# Patient Record
Sex: FEMALE | Race: WHITE | NOT HISPANIC OR LATINO | Employment: FULL TIME | URBAN - METROPOLITAN AREA
[De-identification: names, ages, dates, MRNs, and addresses within clinical notes are randomized per-mention and may not be internally consistent; named-entity substitution may affect disease eponyms.]

---

## 2017-01-19 ENCOUNTER — ALLSCRIPTS OFFICE VISIT (OUTPATIENT)
Dept: OTHER | Facility: OTHER | Age: 62
End: 2017-01-19

## 2017-01-19 DIAGNOSIS — N18.9 CHRONIC KIDNEY DISEASE: ICD-10-CM

## 2017-01-19 DIAGNOSIS — R21 RASH AND OTHER NONSPECIFIC SKIN ERUPTION: ICD-10-CM

## 2017-01-19 DIAGNOSIS — I10 ESSENTIAL (PRIMARY) HYPERTENSION: ICD-10-CM

## 2017-04-19 ENCOUNTER — APPOINTMENT (OUTPATIENT)
Dept: LAB | Facility: CLINIC | Age: 62
End: 2017-04-19
Payer: COMMERCIAL

## 2017-04-19 ENCOUNTER — TRANSCRIBE ORDERS (OUTPATIENT)
Dept: LAB | Facility: CLINIC | Age: 62
End: 2017-04-19

## 2017-04-19 DIAGNOSIS — I10 ESSENTIAL HYPERTENSION, MALIGNANT: ICD-10-CM

## 2017-04-19 DIAGNOSIS — N18.9 CHRONIC KIDNEY DISEASE: ICD-10-CM

## 2017-04-19 DIAGNOSIS — I10 ESSENTIAL (PRIMARY) HYPERTENSION: ICD-10-CM

## 2017-04-19 DIAGNOSIS — R21 RASH AND OTHER NONSPECIFIC SKIN ERUPTION: ICD-10-CM

## 2017-04-19 DIAGNOSIS — N18.9 ANEMIA IN CHRONIC KIDNEY DISEASE(285.21): ICD-10-CM

## 2017-04-19 DIAGNOSIS — N18.9 ANEMIA IN CHRONIC KIDNEY DISEASE(285.21): Primary | ICD-10-CM

## 2017-04-19 DIAGNOSIS — D63.1 ANEMIA IN CHRONIC KIDNEY DISEASE(285.21): ICD-10-CM

## 2017-04-19 DIAGNOSIS — D63.1 ANEMIA IN CHRONIC KIDNEY DISEASE(285.21): Primary | ICD-10-CM

## 2017-04-19 LAB
ALBUMIN SERPL BCP-MCNC: 3.4 G/DL (ref 3.5–5)
ALP SERPL-CCNC: 103 U/L (ref 46–116)
ALT SERPL W P-5'-P-CCNC: 24 U/L (ref 12–78)
ANION GAP SERPL CALCULATED.3IONS-SCNC: 5 MMOL/L (ref 4–13)
AST SERPL W P-5'-P-CCNC: 5 U/L (ref 5–45)
BASOPHILS # BLD AUTO: 0.04 THOUSANDS/ΜL (ref 0–0.1)
BASOPHILS NFR BLD AUTO: 1 % (ref 0–1)
BILIRUB SERPL-MCNC: 0.31 MG/DL (ref 0.2–1)
BUN SERPL-MCNC: 22 MG/DL (ref 5–25)
CALCIUM SERPL-MCNC: 8.7 MG/DL (ref 8.3–10.1)
CHLORIDE SERPL-SCNC: 104 MMOL/L (ref 100–108)
CHOLEST SERPL-MCNC: 182 MG/DL (ref 50–200)
CO2 SERPL-SCNC: 29 MMOL/L (ref 21–32)
CREAT SERPL-MCNC: 0.99 MG/DL (ref 0.6–1.3)
CREAT UR-MCNC: 37.1 MG/DL
EOSINOPHIL # BLD AUTO: 0.17 THOUSAND/ΜL (ref 0–0.61)
EOSINOPHIL NFR BLD AUTO: 3 % (ref 0–6)
ERYTHROCYTE [DISTWIDTH] IN BLOOD BY AUTOMATED COUNT: 15.2 % (ref 11.6–15.1)
GFR SERPL CREATININE-BSD FRML MDRD: 57 ML/MIN/1.73SQ M
GLUCOSE P FAST SERPL-MCNC: 97 MG/DL (ref 65–99)
HCT VFR BLD AUTO: 38.8 % (ref 34.8–46.1)
HDLC SERPL-MCNC: 68 MG/DL (ref 40–60)
HGB BLD-MCNC: 12.3 G/DL (ref 11.5–15.4)
LDLC SERPL CALC-MCNC: 100 MG/DL (ref 0–100)
LYMPHOCYTES # BLD AUTO: 2.21 THOUSANDS/ΜL (ref 0.6–4.47)
LYMPHOCYTES NFR BLD AUTO: 33 % (ref 14–44)
MCH RBC QN AUTO: 28.5 PG (ref 26.8–34.3)
MCHC RBC AUTO-ENTMCNC: 31.7 G/DL (ref 31.4–37.4)
MCV RBC AUTO: 90 FL (ref 82–98)
MICROALBUMIN UR-MCNC: 54.3 MG/L (ref 0–20)
MICROALBUMIN/CREAT 24H UR: 146 MG/G CREATININE (ref 0–30)
MONOCYTES # BLD AUTO: 0.59 THOUSAND/ΜL (ref 0.17–1.22)
MONOCYTES NFR BLD AUTO: 9 % (ref 4–12)
NEUTROPHILS # BLD AUTO: 3.62 THOUSANDS/ΜL (ref 1.85–7.62)
NEUTS SEG NFR BLD AUTO: 54 % (ref 43–75)
NRBC BLD AUTO-RTO: 0 /100 WBCS
PLATELET # BLD AUTO: 253 THOUSANDS/UL (ref 149–390)
PMV BLD AUTO: 11 FL (ref 8.9–12.7)
POTASSIUM SERPL-SCNC: 4.1 MMOL/L (ref 3.5–5.3)
PROT SERPL-MCNC: 7.2 G/DL (ref 6.4–8.2)
RBC # BLD AUTO: 4.32 MILLION/UL (ref 3.81–5.12)
SODIUM SERPL-SCNC: 138 MMOL/L (ref 136–145)
TRIGL SERPL-MCNC: 68 MG/DL
TSH SERPL DL<=0.05 MIU/L-ACNC: 3.46 UIU/ML (ref 0.36–3.74)
WBC # BLD AUTO: 6.67 THOUSAND/UL (ref 4.31–10.16)

## 2017-04-19 PROCEDURE — 80053 COMPREHEN METABOLIC PANEL: CPT

## 2017-04-19 PROCEDURE — 80061 LIPID PANEL: CPT

## 2017-04-19 PROCEDURE — 36415 COLL VENOUS BLD VENIPUNCTURE: CPT

## 2017-04-19 PROCEDURE — 82043 UR ALBUMIN QUANTITATIVE: CPT

## 2017-04-19 PROCEDURE — 85025 COMPLETE CBC W/AUTO DIFF WBC: CPT

## 2017-04-19 PROCEDURE — 84443 ASSAY THYROID STIM HORMONE: CPT

## 2017-04-19 PROCEDURE — 82570 ASSAY OF URINE CREATININE: CPT

## 2017-05-03 ENCOUNTER — ALLSCRIPTS OFFICE VISIT (OUTPATIENT)
Dept: OTHER | Facility: OTHER | Age: 62
End: 2017-05-03

## 2017-06-08 ENCOUNTER — GENERIC CONVERSION - ENCOUNTER (OUTPATIENT)
Dept: OTHER | Facility: OTHER | Age: 62
End: 2017-06-08

## 2017-06-29 ENCOUNTER — ALLSCRIPTS OFFICE VISIT (OUTPATIENT)
Dept: OTHER | Facility: OTHER | Age: 62
End: 2017-06-29

## 2017-11-11 ENCOUNTER — APPOINTMENT (OUTPATIENT)
Dept: LAB | Facility: MEDICAL CENTER | Age: 62
End: 2017-11-11
Payer: COMMERCIAL

## 2017-11-11 ENCOUNTER — TRANSCRIBE ORDERS (OUTPATIENT)
Dept: ADMINISTRATIVE | Facility: HOSPITAL | Age: 62
End: 2017-11-11

## 2017-11-11 DIAGNOSIS — N28.1 KIDNEY CYST, ACQUIRED: Primary | ICD-10-CM

## 2017-11-11 DIAGNOSIS — N18.2 CHRONIC KIDNEY DISEASE, STAGE II (MILD): ICD-10-CM

## 2017-11-11 DIAGNOSIS — N18.2 CHRONIC KIDNEY DISEASE, STAGE II (MILD): Primary | ICD-10-CM

## 2017-11-11 LAB
ALBUMIN SERPL BCP-MCNC: 3.8 G/DL (ref 3.5–5)
ALP SERPL-CCNC: 106 U/L (ref 46–116)
ALT SERPL W P-5'-P-CCNC: 27 U/L (ref 12–78)
ANION GAP SERPL CALCULATED.3IONS-SCNC: 7 MMOL/L (ref 4–13)
AST SERPL W P-5'-P-CCNC: 7 U/L (ref 5–45)
BASOPHILS # BLD AUTO: 0.04 THOUSANDS/ΜL (ref 0–0.1)
BASOPHILS NFR BLD AUTO: 1 % (ref 0–1)
BILIRUB SERPL-MCNC: 0.45 MG/DL (ref 0.2–1)
BUN SERPL-MCNC: 19 MG/DL (ref 5–25)
CALCIUM SERPL-MCNC: 9.3 MG/DL (ref 8.3–10.1)
CHLORIDE SERPL-SCNC: 106 MMOL/L (ref 100–108)
CO2 SERPL-SCNC: 28 MMOL/L (ref 21–32)
CREAT SERPL-MCNC: 1.09 MG/DL (ref 0.6–1.3)
CREAT UR-MCNC: 134 MG/DL
EOSINOPHIL # BLD AUTO: 0.09 THOUSAND/ΜL (ref 0–0.61)
EOSINOPHIL NFR BLD AUTO: 1 % (ref 0–6)
ERYTHROCYTE [DISTWIDTH] IN BLOOD BY AUTOMATED COUNT: 14.5 % (ref 11.6–15.1)
GFR SERPL CREATININE-BSD FRML MDRD: 55 ML/MIN/1.73SQ M
GLUCOSE P FAST SERPL-MCNC: 113 MG/DL (ref 65–99)
HCT VFR BLD AUTO: 40.5 % (ref 34.8–46.1)
HGB BLD-MCNC: 12.9 G/DL (ref 11.5–15.4)
LYMPHOCYTES # BLD AUTO: 2.68 THOUSANDS/ΜL (ref 0.6–4.47)
LYMPHOCYTES NFR BLD AUTO: 37 % (ref 14–44)
MAGNESIUM SERPL-MCNC: 2.4 MG/DL (ref 1.6–2.6)
MCH RBC QN AUTO: 28.4 PG (ref 26.8–34.3)
MCHC RBC AUTO-ENTMCNC: 31.9 G/DL (ref 31.4–37.4)
MCV RBC AUTO: 89 FL (ref 82–98)
MICROALBUMIN UR-MCNC: 176 MG/L (ref 0–20)
MICROALBUMIN/CREAT 24H UR: 131 MG/G CREATININE (ref 0–30)
MONOCYTES # BLD AUTO: 0.49 THOUSAND/ΜL (ref 0.17–1.22)
MONOCYTES NFR BLD AUTO: 7 % (ref 4–12)
NEUTROPHILS # BLD AUTO: 4 THOUSANDS/ΜL (ref 1.85–7.62)
NEUTS SEG NFR BLD AUTO: 54 % (ref 43–75)
NRBC BLD AUTO-RTO: 0 /100 WBCS
PLATELET # BLD AUTO: 260 THOUSANDS/UL (ref 149–390)
PMV BLD AUTO: 10.8 FL (ref 8.9–12.7)
POTASSIUM SERPL-SCNC: 3.8 MMOL/L (ref 3.5–5.3)
PROT SERPL-MCNC: 7.9 G/DL (ref 6.4–8.2)
RBC # BLD AUTO: 4.54 MILLION/UL (ref 3.81–5.12)
SODIUM SERPL-SCNC: 141 MMOL/L (ref 136–145)
WBC # BLD AUTO: 7.33 THOUSAND/UL (ref 4.31–10.16)

## 2017-11-11 PROCEDURE — 82570 ASSAY OF URINE CREATININE: CPT | Performed by: INTERNAL MEDICINE

## 2017-11-11 PROCEDURE — 83735 ASSAY OF MAGNESIUM: CPT

## 2017-11-11 PROCEDURE — 36415 COLL VENOUS BLD VENIPUNCTURE: CPT

## 2017-11-11 PROCEDURE — 80053 COMPREHEN METABOLIC PANEL: CPT

## 2017-11-11 PROCEDURE — 82043 UR ALBUMIN QUANTITATIVE: CPT | Performed by: INTERNAL MEDICINE

## 2017-11-11 PROCEDURE — 85025 COMPLETE CBC W/AUTO DIFF WBC: CPT

## 2017-11-24 ENCOUNTER — HOSPITAL ENCOUNTER (OUTPATIENT)
Dept: ULTRASOUND IMAGING | Facility: MEDICAL CENTER | Age: 62
Discharge: HOME | End: 2017-11-24
Payer: COMMERCIAL

## 2017-11-24 DIAGNOSIS — N28.1 KIDNEY CYST, ACQUIRED: ICD-10-CM

## 2017-11-24 PROCEDURE — 76770 US EXAM ABDO BACK WALL COMP: CPT

## 2017-12-11 ENCOUNTER — GENERIC CONVERSION - ENCOUNTER (OUTPATIENT)
Dept: FAMILY MEDICINE CLINIC | Facility: CLINIC | Age: 62
End: 2017-12-11

## 2017-12-14 ENCOUNTER — ALLSCRIPTS OFFICE VISIT (OUTPATIENT)
Dept: OTHER | Facility: OTHER | Age: 62
End: 2017-12-14

## 2017-12-29 NOTE — PROGRESS NOTES
Assessment   1  Atrial fibrillation, unspecified type (427 31) (I48 91)   2  Essential hypertension (401 9) (I10)   3  Chronic kidney disease (585 9) (N18 9)   4  Screen for colon cancer (V76 51) (Z12 11)    Plan   Atrial fibrillation, unspecified type    · 3 - Markson MD, Leopold Mallet  (Cardiology) Co-Management  *  Status: Active  Requested for:    [id number]  Care Summary provided  : Yes  Need for influenza vaccination    · Fluzone Quadrivalent 0 5 ML Intramuscular Suspension  Screen for colon cancer    · (1) OCCULT BLOOD, FECAL IMMUNOCHEMICAL TEST; Status:Active; Requested    for:[id number];     Discussion/Summary      F/u approx 3 months  The patient was counseled regarding diagnostic results,-- instructions for management,-- risk factor reductions,-- patient and family education,-- impressions  The treatment plan was reviewed with the patient/guardian  The patient/guardian understands and agrees with the treatment plan      Chief Complaint   Regular follow up      History of Present Illness   just saw nephrologist 3 days ago, lisinopril increased to 10mg daily and started on 12 5mg hctz - will see again in follow up in april not seen cardiologist for 2 years due to no insurance      Review of Systems        Constitutional: recent 10 pounds intentionally lb weight loss, but-- no fever,-- not feeling poorly,-- no chills-- and-- not feeling tired  Cardiovascular: No complaints of slow heart rate, no fast heart rate, no chest pain, no palpitations, no leg claudication, no lower extremity edema  Respiratory: No complaints of shortness of breath, no wheezing, no cough, no SOB on exertion, no orthopnea, no PND  Gastrointestinal: No complaints of abdominal pain, no constipation, no nausea or vomiting, no diarrhea, no bloody stools  Genitourinary: No complaints of dysuria, no incontinence, no pelvic pain, no dysmenorrhea, no vaginal discharge or bleeding        Musculoskeletal: knees bother standing on concrete all day, but-- no joint swelling-- and-- no limb swelling  Integumentary: No complaints of skin rash or lesions, no itching, no skin wounds, no breast pain or lump  Neurological: No complaints of headache, no confusion, no convulsions, no numbness, no dizziness or fainting, no tingling, no limb weakness, no difficulty walking  Psychiatric: Not suicidal, no sleep disturbance, no anxiety or depression, no change in personality, no emotional problems  Endocrine: No complaints of proptosis, no hot flashes, no muscle weakness, no deepening of the voice, no feelings of weakness  Hematologic/Lymphatic: No complaints of swollen glands, no swollen glands in the neck, does not bleed easily, does not bruise easily  Active Problems   1  Atrial fibrillation, unspecified type (427 31) (I48 91)   2  Chronic kidney disease (585 9) (N18 9)   3  Essential hypertension (401 9) (I10)   4  Morbid obesity (278 01) (E66 01)   5  Multiple renal cysts (753 19) (Q61 02)   6  Need for influenza vaccination (V04 81) (Z23)   7  Pain of right scapula (733 90) (J71 7J8)    Past Medical History   1  History of Acute gastroenteritis (558 9) (K52 9)   2  History of Acute vomiting (787 03) (R11 10)   3  History of fever (V13 89) (Z87 898)   4  History of headache (V13 89) (Z87 898)   5  History of hypokalemia (V12 29) (Z86 39)   6  History of pneumonia (V12 61) (Z87 01)   7  History of Hyponatremia (276 1) (E87 1)   8  History of Irregular heartbeat (427 9) (I49 9)   9  History of Need for Tdap vaccination (V06 1) (Z23)   10  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)   11  History of Rash (782 1) (R21)   12  History of Rash, skin (782 1) (R21)   13  History of Sinus problem (473 9) (J34 9)   14  History of Spell of dizziness (780 4) (R42)     The active problems and past medical history were reviewed and updated today        Surgical History      The surgical history was reviewed and updated today  Family History   Mother    1  Family history of    2  Family history of malignant neoplasm (V16 9) (Z80 9)  Father    3  Family history of    4  Family history of hypertension (V17 49) (Z82 49)   5  Family history of malignant neoplasm (V16 9) (Z80 9)  Sister    6  Family history of epilepsy (V17 2) (Z82 0)   7  Family history of hypertension (V17 49) (Z82 49)  Brother    6  Family history of colon cancer (V16 0) (Z80 0)   9  Family history of hypertension (V17 49) (Z82 49)   10  Family history of malignant neoplasm (V16 9) (Z80 9)     The family history was reviewed and updated today  Social History    · Never a smoker   · No alcohol use   · No drug use  The social history was reviewed and updated today  The social history was reviewed and is unchanged  Current Meds    1  AmLODIPine Besylate 5 MG Oral Tablet; Take 1 tablet daily; Therapy: 20KSX5792 to (Evaluate:44Ehu9824)  Requested for: 87HXZ2816; Last     Rx:2017 Ordered   2  Aspirin 81 MG TABS; TAKE 1 TABLET DAILY; Therapy: 73ZJY5791 to (Evaluate:2016) Recorded   3  Cranberry 500 MG Oral Capsule; take 1 capsule daily; Therapy: 47YDJ1029 to Recorded   4  Daily Multiple Vitamins Oral Tablet; TAKE 1 TABLET DAILY; Therapy: 68ZRV8865 to Recorded   5  Fish Oil 1000 MG Oral Capsule; TAKE 1 CAPSULE DAILY; Therapy: 16ZKQ5110 to Recorded   6  HydroCHLOROthiazide 12 5 MG Oral Tablet; Therapy: 70IXB9755 to Recorded   7  Lisinopril 10 MG Oral Tablet; Therapy: 93MSG6236 to Recorded   8  Metoprolol Tartrate 50 MG Oral Tablet; TAKE 1 TABLET TWICE DAILY; Therapy: 91EDO0060 to (Evaluate:23Ikj5094)  Requested for: 57NIV9859; Last     Rx:2017 Ordered   9  Vitamin D3 2000 UNIT Oral Capsule; take 1 capsule daily; Therapy: 21UCH3223 to Recorded     The medication list was reviewed and updated today  Allergies   1  No Known Drug Allergies  2  No Known Food Allergies   3  Seasonal    Vitals   Vital Signs    Recorded: 87VRL0984 05:30PM   Temperature 97 1 F   Heart Rate 97   Respiration 18   Systolic 276   Diastolic 86   Height 5 ft 9 in   Weight 277 lb 8 oz   BMI Calculated 40 98   BSA Calculated 2 37   O2 Saturation 99     Physical Exam        Constitutional      General appearance: Abnormal  -- unchanged appearance  Eyes      Conjunctiva and lids: No swelling, erythema or discharge  Pupils and irises: Equal, round and reactive to light  Ears, Nose, Mouth, and Throat      Oropharynx: Normal with no erythema, edema, exudate or lesions  Pulmonary      Respiratory effort: No increased work of breathing or signs of respiratory distress  Auscultation of lungs: Clear to auscultation  Cardiovascular      Auscultation of heart: Normal rate and rhythm, normal S1 and S2, without murmurs  Examination of extremities for edema and/or varicosities: Abnormal   bilateral ankle trcae+ pitting edema-- and-- bilateral pretibial trace+ pitting edema  Lymphatic      Palpation of lymph nodes in neck: No lymphadenopathy  Musculoskeletal      Gait and station: Normal        Digits and nails: Normal without clubbing or cyanosis  Neurologic      Reflexes: 2+ and symmetric  Psychiatric      Mood and affect: Normal           Results/Data   (1) CBC/PLT/DIFF [id number] 07:30AM EP[facility], Provider   Test ordered by: Evaristo Leung      Test Name Result Flag Reference   WBC COUNT 7 33 Thousand/uL  4 31-10 16   RBC COUNT 4 54 Million/uL  3 81-5 12   HEMOGLOBIN 12 9 g/dL  11 5-15 4   HEMATOCRIT 40 5 %  34 8-46  1   MCV 89 fL  82-98   MCH 28 4 pg  26 8-34 3   MCHC 31 9 g/dL  31 4-37 4   RDW 14 5 %  11 6-15 1   MPV 10 8 fL  8 9-12 7   PLATELET COUNT 288 Thousands/uL  149-390   nRBC AUTOMATED 0 /100 WBCs     NEUTROPHILS RELATIVE PERCENT 54 %  43-75   LYMPHOCYTES RELATIVE PERCENT 37 %  14-44   MONOCYTES RELATIVE PERCENT 7 %  4-12   EOSINOPHILS RELATIVE PERCENT 1 % 0-6   BASOPHILS RELATIVE PERCENT 1 %  0-1   NEUTROPHILS ABSOLUTE COUNT 4 00 Thousands/? ??L  1 85-7 62   LYMPHOCYTES ABSOLUTE COUNT 2 68 Thousands/? ??L  0 60-4 47   MONOCYTES ABSOLUTE COUNT 0 49 Thousand/? ??L  0 17-1 22   EOSINOPHILS ABSOLUTE COUNT 0 09 Thousand/? ??L  0 00-0 61   BASOPHILS ABSOLUTE COUNT 0 04 Thousands/? ??L  0 00-0 10   This is a patient instruction: This test is non-fasting  Please drink two glasses of water morning of bloodwork  (1) MAGNESIUM [id number] 07:30AM EPIC, Provider   Test ordered by: Isis Cui      Test Name Result Flag Reference   MAGNESIUM 2 4 mg/dL  1 6-2 6      (1) COMPREHENSIVE METABOLIC PANEL [id number] 83:95NO EPIC, Provider   Test ordered by: [employer]      Test Name Result Flag Reference   SODIUM 141 mmol/L  136-145   POTASSIUM 3 8 mmol/L  3 5-5 3   CHLORIDE 106 mmol/L  100-108   CARBON DIOXIDE 28 mmol/L  21-32   ANION GAP (CALC) 7 mmol/L  4-13   BLOOD UREA NITROGEN 19 mg/dL  5-25   CREATININE 1 09 mg/dL  0 60-1 30   Standardized to IDMS reference method   CALCIUM 9 3 mg/dL  8 3-10 1   BILI, TOTAL 0 45 mg/dL  0 20-1 00   ALK PHOSPHATAS 106 U/L     ALT (SGPT) 27 U/L  12-78   Specimen collection should occur prior to Sulfasalazine and/or Sulfapyridine administration due to the potential for falsely depressed results  AST(SGOT) 7 U/L  5-45   Specimen collection should occur prior to Sulfasalazine administration due to the potential for falsely depressed results  ALBUMIN 3 8 g/dL  3 5-5 0   TOTAL PROTEIN 7 9 g/dL  6 4-8 2   eGFR 55 ml/min/1 73sq m     [facility] [employer] Disease Education Program recommendations are as follows:     GFR calculation is accurate only with a steady state creatinine     Chronic Kidney disease less than 60 ml/min/1 73 sq  meters     Kidney failure less than 15 ml/min/1 73 sq  meters     GLUCOSE FASTING 113 mg/dL H 65-99   Specimen collection should occur prior to Sulfasalazine administration due to the potential for falsely depressed results  Specimen collection should occur prior to Sulfapyridine administration due to the potential for falsely elevated results        (1) MICROALBUMIN CREATININE RATIO, RANDOM URINE [id number] 07:30AM [facility], Provider   Test ordered by: Ana Kumar      Test Name Result Flag Reference   MICROALBUMIN/ CREAT R 131 mg/g creatinine H 0-30   MICROALBUMIN,URINE 176 0 mg/L H 0 0-20 0   CREATININE URINE 134 0 mg/dL        Signatures    Electronically signed by : Rene Escobar DO; Dec 28 2017 12:59PM EST                       (Author)

## 2018-01-12 NOTE — MISCELLANEOUS
Message  Return to work or school:   Hung Parker is under my professional care  She was seen in my office on 07/26/2016   She is able to return to work on  08/01/2016   She is not able to return to work until 08/01/2016 -Patient was out of work 07/25/2016 thru 07/29/2016   She is able to perform activities of daily living without limitations  , She can perform work without limitations  Dr Patti Delgado          Signatures   Electronically signed by : Isreal Brown RN; Jul 29 2016 10:19AM EST                       (Author)    Electronically signed by : Patti Delgado DO; Aug  2 2016  4:15PM EST                       (Author)

## 2018-01-13 VITALS
BODY MASS INDEX: 41.83 KG/M2 | OXYGEN SATURATION: 98 % | HEART RATE: 90 BPM | DIASTOLIC BLOOD PRESSURE: 112 MMHG | TEMPERATURE: 98.9 F | SYSTOLIC BLOOD PRESSURE: 176 MMHG | WEIGHT: 282.38 LBS | HEIGHT: 69 IN

## 2018-01-14 VITALS
OXYGEN SATURATION: 97 % | TEMPERATURE: 101.8 F | WEIGHT: 287.38 LBS | SYSTOLIC BLOOD PRESSURE: 140 MMHG | DIASTOLIC BLOOD PRESSURE: 80 MMHG | HEIGHT: 69 IN | RESPIRATION RATE: 18 BRPM | HEART RATE: 102 BPM | BODY MASS INDEX: 42.56 KG/M2

## 2018-01-14 VITALS
OXYGEN SATURATION: 97 % | HEIGHT: 69 IN | TEMPERATURE: 98.8 F | DIASTOLIC BLOOD PRESSURE: 100 MMHG | HEART RATE: 98 BPM | RESPIRATION RATE: 19 BRPM | BODY MASS INDEX: 40.45 KG/M2 | SYSTOLIC BLOOD PRESSURE: 160 MMHG | WEIGHT: 273.13 LBS

## 2018-01-15 NOTE — MISCELLANEOUS
Message  Return to work or school:    She is able to return to work on  08/24/2016      Dr Kushal Ayala - Patient has been treated for Lyme disease          Signatures   Electronically signed by : Red Aguilar RN; Aug 22 2016  2:10PM EST                       (Author)

## 2018-01-23 VITALS
WEIGHT: 277.5 LBS | HEIGHT: 69 IN | RESPIRATION RATE: 18 BRPM | TEMPERATURE: 97.1 F | OXYGEN SATURATION: 99 % | BODY MASS INDEX: 41.1 KG/M2 | HEART RATE: 97 BPM | DIASTOLIC BLOOD PRESSURE: 86 MMHG | SYSTOLIC BLOOD PRESSURE: 144 MMHG

## 2018-02-15 DIAGNOSIS — I10 ESSENTIAL HYPERTENSION: Primary | ICD-10-CM

## 2018-02-15 RX ORDER — METOPROLOL TARTRATE 50 MG/1
50 TABLET, FILM COATED ORAL 2 TIMES DAILY
Qty: 180 TABLET | Refills: 0 | Status: SHIPPED | OUTPATIENT
Start: 2018-02-15 | End: 2018-02-18 | Stop reason: SDUPTHER

## 2018-02-15 RX ORDER — AMLODIPINE BESYLATE 5 MG/1
5 TABLET ORAL DAILY
Qty: 90 TABLET | Refills: 0 | Status: SHIPPED | OUTPATIENT
Start: 2018-02-15 | End: 2018-06-09 | Stop reason: SDUPTHER

## 2018-02-15 RX ORDER — METOPROLOL TARTRATE 50 MG/1
1 TABLET, FILM COATED ORAL 2 TIMES DAILY
COMMUNITY
Start: 2015-10-29 | End: 2018-02-15 | Stop reason: SDUPTHER

## 2018-02-15 RX ORDER — AMLODIPINE BESYLATE 5 MG/1
1 TABLET ORAL DAILY
COMMUNITY
Start: 2017-01-19 | End: 2018-02-15 | Stop reason: SDUPTHER

## 2018-02-18 DIAGNOSIS — I10 ESSENTIAL HYPERTENSION: ICD-10-CM

## 2018-02-19 RX ORDER — METOPROLOL TARTRATE 50 MG/1
TABLET, FILM COATED ORAL
Qty: 180 TABLET | Refills: 0 | Status: SHIPPED | OUTPATIENT
Start: 2018-02-19 | End: 2018-07-25 | Stop reason: SDUPTHER

## 2018-04-14 LAB
ALBUMIN SERPL BCP-MCNC: 4.3 G/DL (ref 3.5–5.7)
ALP SERPL-CCNC: 87 IU/L (ref 55–165)
ALT SERPL W P-5'-P-CCNC: 15 IU/L (ref 10–30)
ANION GAP SERPL CALCULATED.3IONS-SCNC: 13.4 MM/L
AST SERPL W P-5'-P-CCNC: 9 U/L (ref 7–26)
BILIRUB SERPL-MCNC: 0.4 MG/DL (ref 0.3–1)
BUN SERPL-MCNC: 29 MG/DL (ref 7–25)
CALCIUM SERPL-MCNC: 9.4 MG/DL (ref 8.6–10.5)
CHLORIDE SERPL-SCNC: 105 MM/L (ref 98–107)
CO2 SERPL-SCNC: 27 MM/L (ref 21–31)
CREAT SERPL-MCNC: 1.16 MG/DL (ref 0.6–1.2)
EGFR (HISTORICAL): 47 GFR
EGFR AFRICAN AMERICAN (HISTORICAL): 57 GFR
GLUCOSE (HISTORICAL): 110 MG/DL (ref 65–99)
OSMOLALITY, SERUM (HISTORICAL): 288 MOSM (ref 262–291)
POTASSIUM SERPL-SCNC: 4.4 MM/L (ref 3.5–5.5)
SODIUM SERPL-SCNC: 141 MM/L (ref 134–143)
TOTAL PROTEIN (HISTORICAL): 7.2 G/DL (ref 6.4–8.9)

## 2018-06-09 DIAGNOSIS — I10 ESSENTIAL HYPERTENSION: ICD-10-CM

## 2018-06-10 RX ORDER — AMLODIPINE BESYLATE 5 MG/1
TABLET ORAL
Qty: 30 TABLET | Refills: 0 | Status: SHIPPED | OUTPATIENT
Start: 2018-06-10 | End: 2018-07-10 | Stop reason: SDUPTHER

## 2018-07-09 RX ORDER — CHLORAL HYDRATE 500 MG
1 CAPSULE ORAL DAILY
COMMUNITY
Start: 2015-10-29

## 2018-07-09 RX ORDER — PYRIDOXINE HCL (VITAMIN B6) 100 MG
1 TABLET ORAL DAILY
COMMUNITY
Start: 2015-10-29

## 2018-07-09 RX ORDER — HYDROCHLOROTHIAZIDE 12.5 MG/1
TABLET ORAL
COMMUNITY
Start: 2017-12-14 | End: 2018-10-15 | Stop reason: DRUGHIGH

## 2018-07-09 RX ORDER — ACETAMINOPHEN 160 MG
1 TABLET,DISINTEGRATING ORAL DAILY
COMMUNITY
Start: 2015-10-29

## 2018-07-09 RX ORDER — LISINOPRIL 10 MG/1
10 TABLET ORAL DAILY
Refills: 3 | COMMUNITY
Start: 2018-05-05 | End: 2018-10-15 | Stop reason: DRUGHIGH

## 2018-07-10 DIAGNOSIS — I10 ESSENTIAL HYPERTENSION: ICD-10-CM

## 2018-07-12 ENCOUNTER — OFFICE VISIT (OUTPATIENT)
Dept: FAMILY MEDICINE CLINIC | Facility: CLINIC | Age: 63
End: 2018-07-12
Payer: COMMERCIAL

## 2018-07-12 VITALS
HEART RATE: 85 BPM | DIASTOLIC BLOOD PRESSURE: 90 MMHG | SYSTOLIC BLOOD PRESSURE: 140 MMHG | BODY MASS INDEX: 41.62 KG/M2 | HEIGHT: 68 IN | OXYGEN SATURATION: 98 % | WEIGHT: 274.6 LBS | TEMPERATURE: 98.9 F

## 2018-07-12 DIAGNOSIS — I10 ESSENTIAL HYPERTENSION: ICD-10-CM

## 2018-07-12 DIAGNOSIS — E66.01 MORBID OBESITY (HCC): ICD-10-CM

## 2018-07-12 DIAGNOSIS — N18.9 CHRONIC KIDNEY DISEASE, UNSPECIFIED CKD STAGE: ICD-10-CM

## 2018-07-12 DIAGNOSIS — I48.91 ATRIAL FIBRILLATION, UNSPECIFIED TYPE (HCC): ICD-10-CM

## 2018-07-12 DIAGNOSIS — Z23 NEED FOR SHINGLES VACCINE: ICD-10-CM

## 2018-07-12 DIAGNOSIS — Z12.11 SCREEN FOR COLON CANCER: Primary | ICD-10-CM

## 2018-07-12 PROCEDURE — 99214 OFFICE O/P EST MOD 30 MIN: CPT | Performed by: FAMILY MEDICINE

## 2018-07-12 PROCEDURE — 3008F BODY MASS INDEX DOCD: CPT | Performed by: FAMILY MEDICINE

## 2018-07-12 PROCEDURE — 1036F TOBACCO NON-USER: CPT | Performed by: FAMILY MEDICINE

## 2018-07-12 RX ORDER — AMLODIPINE BESYLATE 5 MG/1
TABLET ORAL
Qty: 30 TABLET | Refills: 0 | Status: SHIPPED | OUTPATIENT
Start: 2018-07-12 | End: 2018-07-12 | Stop reason: DRUGHIGH

## 2018-07-12 RX ORDER — AMLODIPINE BESYLATE 10 MG/1
10 TABLET ORAL DAILY
Qty: 90 TABLET | Refills: 0 | Status: SHIPPED | OUTPATIENT
Start: 2018-07-12 | End: 2018-10-08 | Stop reason: SDUPTHER

## 2018-07-12 RX ORDER — AMLODIPINE BESYLATE 5 MG/1
5 TABLET ORAL DAILY
Qty: 90 TABLET | Refills: 0 | Status: CANCELLED | OUTPATIENT
Start: 2018-07-12

## 2018-07-12 NOTE — PROGRESS NOTES
Assessment/Plan:       Diagnoses and all orders for this visit:    Screen for colon cancer    Essential hypertension  -     amLODIPine (NORVASC) 10 mg tablet; Take 1 tablet (10 mg total) by mouth daily  -     Lipid panel; Future    Atrial fibrillation, unspecified type ([facility] [address] )  -     Lipid panel; Future    Morbid obesity ([facility] )  -     Lipid panel; Future    Chronic kidney disease, unspecified CKD stage  -     Lipid panel; Future    Need for shingles vaccine  -     Zoster Vac Recomb Adjuvanted ([address] [place], [place] [address]) 50 MCG SUSR; Inject 1 Dose into a muscle once for 1 dose    Other orders  -     aspirin 81 MG tablet; Take 1 tablet by mouth daily  -     Cranberry 500 MG CAPS; Take 1 capsule by mouth daily  -     Multiple Vitamins-Minerals (DAILY MULTIPLE VITAMINS/MIN PO); Take 1 tablet by mouth daily  -     Omega-3 Fatty Acids (FISH OIL) 1,000 mg; Take 1 capsule by mouth daily  -     hydrochlorothiazide (HYDRODIURIL) 12 5 mg tablet; Take by mouth  -     lisinopril (ZESTRIL) 10 mg tablet; Take 10 mg by mouth daily  -     Cholecalciferol (VITAMIN D3) 2000 units capsule; Take 1 capsule by mouth daily  -     Cancel: Ambulatory referral to Gastroenterology; Future  -     Cancel: amLODIPine (NORVASC) 5 mg tablet; Take 1 tablet (5 mg total) by mouth daily  -     Protein (NUTRA/PRO CHOCOLATE PO); Take by mouth     "other orders" are not orders, just reconciled by clinical staff at rooming     Subjective:   Chief Complaint   Patient presents with    Follow-up     Pt is here for her 6 month f/u exam of chronic conditions  Patient ID: Rafael Jacques is a 61 y o  female      Per c/c reviewed by me  Sees her nephrologist oct 18th  Her last cardiol visit was 2015, supposed to be yearly for the Afib/flutter and HTN, but pt has high deductible and financial hardship, difficult for her  to see specialists and have testing done due to out of pocket expense  Last ekg was 2015 as well  Rate controlled on beta blocker  Last visit here in December 2017, bp was 144/86, was supposed to have followed up 3 mo later and did not        The following portions of the patient's history were reviewed and updated as appropriate: allergies, current medications, past family history, past medical history, past social history, past surgical history and problem list     Review of Systems   Constitutional: Negative  HENT: Negative for mouth sores, nosebleeds, sore throat, trouble swallowing and voice change  Eyes: Negative  Respiratory: Negative  Cardiovascular: Positive for leg swelling (end of a day of standing on concrete, ankle are a little swollen)  Negative for chest pain and palpitations  Gastrointestinal: Negative  Endocrine: Negative  Genitourinary: Negative  Skin: Negative  Neurological: Negative  Hematological: Negative  Objective:      /90 (BP Location: Left arm, Patient Position: Sitting, Cuff Size: Large)   Pulse 85   Temp 98 9 °F (37 2 °C) (Tympanic)   Ht 5' 8" (1 727 m)   Wt 125 kg (274 lb 9 6 oz)   SpO2 98%   BMI 41 75 kg/m²          Physical Exam   Constitutional: She is oriented to person, place, and time  She appears well-developed  She is cooperative  Non-toxic appearance  She does not have a sickly appearance  She does not appear ill  No distress  HENT:   Head: Normocephalic and atraumatic  Mouth/Throat: Uvula is midline, oropharynx is clear and moist and mucous membranes are normal    Eyes: Conjunctivae, EOM and lids are normal  Pupils are equal, round, and reactive to light  Neck: Trachea normal  Neck supple  No JVD present  Carotid bruit is not present  No thyroid mass and no thyromegaly present  Cardiovascular: Normal rate, S1 normal, S2 normal and normal pulses  An irregularly irregular rhythm present  Exam reveals distant heart sounds  Exam reveals no gallop and no friction rub  Pulmonary/Chest: Effort normal and breath sounds normal    Abdominal: Soft   She exhibits no distension, no abdominal bruit, no ascites and no mass  Bowel sounds are decreased  There is no hepatosplenomegaly  There is no tenderness  Lymphadenopathy:     She has no cervical adenopathy  Right: No supraclavicular adenopathy present  Left: No supraclavicular adenopathy present  Neurological: She is alert and oriented to person, place, and time  She has normal reflexes  Gait normal    Skin: Skin is warm and dry  She is not diaphoretic  No cyanosis  No pallor  Psychiatric: She has a normal mood and affect  Her behavior is normal    Nursing note and vitals reviewed

## 2018-07-25 DIAGNOSIS — I10 ESSENTIAL HYPERTENSION: ICD-10-CM

## 2018-07-26 RX ORDER — METOPROLOL TARTRATE 50 MG/1
TABLET, FILM COATED ORAL
Qty: 180 TABLET | Refills: 0 | Status: SHIPPED | OUTPATIENT
Start: 2018-07-26 | End: 2018-10-25 | Stop reason: SDUPTHER

## 2018-10-08 DIAGNOSIS — I10 ESSENTIAL HYPERTENSION: ICD-10-CM

## 2018-10-08 RX ORDER — AMLODIPINE BESYLATE 10 MG/1
TABLET ORAL
Qty: 90 TABLET | Refills: 0 | Status: SHIPPED | OUTPATIENT
Start: 2018-10-08 | End: 2019-03-22 | Stop reason: SDUPTHER

## 2018-10-13 ENCOUNTER — TRANSCRIBE ORDERS (OUTPATIENT)
Dept: ADMINISTRATIVE | Facility: HOSPITAL | Age: 63
End: 2018-10-13

## 2018-10-13 ENCOUNTER — APPOINTMENT (OUTPATIENT)
Dept: LAB | Facility: HOSPITAL | Age: 63
End: 2018-10-13
Payer: COMMERCIAL

## 2018-10-13 DIAGNOSIS — E66.01 MORBID OBESITY (HCC): ICD-10-CM

## 2018-10-13 DIAGNOSIS — I48.91 ATRIAL FIBRILLATION, UNSPECIFIED TYPE (HCC): ICD-10-CM

## 2018-10-13 DIAGNOSIS — N18.30 CHRONIC KIDNEY DISEASE, STAGE III (MODERATE) (HCC): ICD-10-CM

## 2018-10-13 DIAGNOSIS — R80.9 PROTEINURIA, UNSPECIFIED TYPE: Primary | ICD-10-CM

## 2018-10-13 DIAGNOSIS — R80.9 PROTEINURIA, UNSPECIFIED TYPE: ICD-10-CM

## 2018-10-13 DIAGNOSIS — N18.9 CHRONIC KIDNEY DISEASE, UNSPECIFIED CKD STAGE: ICD-10-CM

## 2018-10-13 DIAGNOSIS — I10 ESSENTIAL HYPERTENSION: ICD-10-CM

## 2018-10-13 LAB
ALBUMIN SERPL BCP-MCNC: 4.3 G/DL (ref 3.5–5.7)
ALP SERPL-CCNC: 86 U/L (ref 55–165)
ALT SERPL W P-5'-P-CCNC: 16 U/L (ref 7–52)
ANION GAP SERPL CALCULATED.3IONS-SCNC: 7 MMOL/L (ref 4–13)
AST SERPL W P-5'-P-CCNC: 8 U/L (ref 13–39)
BACTERIA UR QL AUTO: ABNORMAL /HPF
BASOPHILS # BLD AUTO: 0 THOUSANDS/ΜL (ref 0–0.1)
BASOPHILS NFR BLD AUTO: 1 % (ref 0–1)
BILIRUB SERPL-MCNC: 0.4 MG/DL (ref 0.2–1)
BILIRUB UR QL STRIP: NEGATIVE
BUN SERPL-MCNC: 32 MG/DL (ref 7–25)
CALCIUM SERPL-MCNC: 9.8 MG/DL (ref 8.6–10.5)
CHLORIDE SERPL-SCNC: 105 MMOL/L (ref 98–107)
CHOLEST SERPL-MCNC: 161 MG/DL (ref 0–200)
CLARITY UR: CLEAR
CO2 SERPL-SCNC: 29 MMOL/L (ref 21–31)
COLOR UR: YELLOW
CREAT SERPL-MCNC: 1.2 MG/DL (ref 0.6–1.2)
CREAT UR-MCNC: 72.3 MG/DL
EOSINOPHIL # BLD AUTO: 0.1 THOUSAND/ΜL (ref 0–0.61)
EOSINOPHIL NFR BLD AUTO: 2 % (ref 0–6)
ERYTHROCYTE [DISTWIDTH] IN BLOOD BY AUTOMATED COUNT: 14.9 % (ref 11.6–15.1)
GFR SERPL CREATININE-BSD FRML MDRD: 48 ML/MIN/1.73SQ M
GLUCOSE P FAST SERPL-MCNC: 112 MG/DL (ref 65–99)
GLUCOSE UR STRIP-MCNC: NEGATIVE MG/DL
HCT VFR BLD AUTO: 39.2 % (ref 37–47)
HDLC SERPL-MCNC: 51 MG/DL (ref 40–60)
HGB BLD-MCNC: 12.6 G/DL (ref 11.5–15.4)
HGB UR QL STRIP.AUTO: ABNORMAL
KETONES UR STRIP-MCNC: NEGATIVE MG/DL
LDLC SERPL CALC-MCNC: 92 MG/DL (ref 0–100)
LDLC SERPL DIRECT ASSAY-MCNC: 102 MG/DL (ref 75–193)
LEUKOCYTE ESTERASE UR QL STRIP: ABNORMAL
LYMPHOCYTES # BLD AUTO: 2 THOUSANDS/ΜL (ref 0.6–4.47)
LYMPHOCYTES NFR BLD AUTO: 34 % (ref 14–44)
MCH RBC QN AUTO: 28.4 PG (ref 26.8–34.3)
MCHC RBC AUTO-ENTMCNC: 32.1 G/DL (ref 31.4–37.4)
MCV RBC AUTO: 88 FL (ref 82–98)
MICROALBUMIN UR-MCNC: 21.8 MG/L (ref 0–20)
MICROALBUMIN/CREAT 24H UR: 30 MG/G CREATININE (ref 0–30)
MONOCYTES # BLD AUTO: 0.6 THOUSAND/ΜL (ref 0.17–1.22)
MONOCYTES NFR BLD AUTO: 10 % (ref 4–12)
NEUTROPHILS # BLD AUTO: 3.2 THOUSANDS/ΜL (ref 1.85–7.62)
NEUTS SEG NFR BLD AUTO: 54 % (ref 43–75)
NITRITE UR QL STRIP: NEGATIVE
NON-SQ EPI CELLS URNS QL MICRO: ABNORMAL /HPF
NONHDLC SERPL-MCNC: 110 MG/DL
NRBC BLD AUTO-RTO: 0 /100 WBCS
OTHER STN SPEC: ABNORMAL
PH UR STRIP.AUTO: 6.5 [PH] (ref 5–8)
PLATELET # BLD AUTO: 238 THOUSANDS/UL (ref 149–390)
PMV BLD AUTO: 8.3 FL (ref 8.9–12.7)
POTASSIUM SERPL-SCNC: 4.5 MMOL/L (ref 3.5–5.5)
PROT SERPL-MCNC: 7.4 G/DL (ref 6.4–8.9)
PROT UR STRIP-MCNC: NEGATIVE MG/DL
RBC # BLD AUTO: 4.45 MILLION/UL (ref 3.81–5.12)
RBC #/AREA URNS AUTO: ABNORMAL /HPF
SODIUM SERPL-SCNC: 141 MMOL/L (ref 134–143)
SP GR UR STRIP.AUTO: 1.01 (ref 1–1.03)
TRIGL SERPL-MCNC: 88 MG/DL (ref 44–166)
URATE SERPL-MCNC: 7.5 MG/DL (ref 2.3–7.6)
UROBILINOGEN UR QL STRIP.AUTO: 0.2 E.U./DL
WBC # BLD AUTO: 5.9 THOUSAND/UL (ref 4.31–10.16)
WBC #/AREA URNS AUTO: ABNORMAL /HPF

## 2018-10-13 PROCEDURE — 80053 COMPREHEN METABOLIC PANEL: CPT

## 2018-10-13 PROCEDURE — 85025 COMPLETE CBC W/AUTO DIFF WBC: CPT

## 2018-10-13 PROCEDURE — 83721 ASSAY OF BLOOD LIPOPROTEIN: CPT

## 2018-10-13 PROCEDURE — 84550 ASSAY OF BLOOD/URIC ACID: CPT

## 2018-10-13 PROCEDURE — 82232 ASSAY OF BETA-2 PROTEIN: CPT | Performed by: INTERNAL MEDICINE

## 2018-10-13 PROCEDURE — 36415 COLL VENOUS BLD VENIPUNCTURE: CPT

## 2018-10-13 PROCEDURE — 81001 URINALYSIS AUTO W/SCOPE: CPT | Performed by: INTERNAL MEDICINE

## 2018-10-13 PROCEDURE — 80061 LIPID PANEL: CPT

## 2018-10-13 PROCEDURE — 82570 ASSAY OF URINE CREATININE: CPT | Performed by: INTERNAL MEDICINE

## 2018-10-13 PROCEDURE — 82043 UR ALBUMIN QUANTITATIVE: CPT | Performed by: INTERNAL MEDICINE

## 2018-10-15 LAB — B2 MICROGLOB UR-MCNC: 149 UG/L (ref 0–300)

## 2018-10-25 DIAGNOSIS — I10 ESSENTIAL HYPERTENSION: ICD-10-CM

## 2018-10-25 RX ORDER — METOPROLOL TARTRATE 50 MG/1
TABLET, FILM COATED ORAL
Qty: 180 TABLET | Refills: 0 | Status: SHIPPED | OUTPATIENT
Start: 2018-10-25 | End: 2019-01-30 | Stop reason: SDUPTHER

## 2019-01-24 ENCOUNTER — OFFICE VISIT (OUTPATIENT)
Dept: FAMILY MEDICINE CLINIC | Facility: CLINIC | Age: 64
End: 2019-01-24
Payer: COMMERCIAL

## 2019-01-24 VITALS
OXYGEN SATURATION: 98 % | HEIGHT: 68 IN | WEIGHT: 278 LBS | TEMPERATURE: 97.8 F | HEART RATE: 76 BPM | SYSTOLIC BLOOD PRESSURE: 130 MMHG | DIASTOLIC BLOOD PRESSURE: 82 MMHG | RESPIRATION RATE: 17 BRPM | BODY MASS INDEX: 42.13 KG/M2

## 2019-01-24 DIAGNOSIS — I10 ESSENTIAL HYPERTENSION: Primary | ICD-10-CM

## 2019-01-24 DIAGNOSIS — Z53.20 COLONOSCOPY REFUSED: ICD-10-CM

## 2019-01-24 DIAGNOSIS — E66.01 MORBID OBESITY (HCC): ICD-10-CM

## 2019-01-24 DIAGNOSIS — Z23 FLU VACCINE NEED: ICD-10-CM

## 2019-01-24 DIAGNOSIS — Z59.89 HAS HEALTH INSURANCE WITH INADEQUATE COVERAGE OF HEALTH EXPENSES: ICD-10-CM

## 2019-01-24 DIAGNOSIS — I48.91 ATRIAL FIBRILLATION, UNSPECIFIED TYPE (HCC): ICD-10-CM

## 2019-01-24 PROBLEM — Z59.71 HAS HEALTH INSURANCE WITH INADEQUATE COVERAGE OF HEALTH EXPENSES: Status: ACTIVE | Noted: 2019-01-24

## 2019-01-24 PROCEDURE — 3079F DIAST BP 80-89 MM HG: CPT | Performed by: FAMILY MEDICINE

## 2019-01-24 PROCEDURE — 90471 IMMUNIZATION ADMIN: CPT

## 2019-01-24 PROCEDURE — 90686 IIV4 VACC NO PRSV 0.5 ML IM: CPT

## 2019-01-24 PROCEDURE — 3075F SYST BP GE 130 - 139MM HG: CPT | Performed by: FAMILY MEDICINE

## 2019-01-24 PROCEDURE — 3008F BODY MASS INDEX DOCD: CPT | Performed by: FAMILY MEDICINE

## 2019-01-24 PROCEDURE — 99214 OFFICE O/P EST MOD 30 MIN: CPT | Performed by: FAMILY MEDICINE

## 2019-01-24 RX ORDER — LISINOPRIL AND HYDROCHLOROTHIAZIDE 25; 20 MG/1; MG/1
1 TABLET ORAL EVERY MORNING
Refills: 3 | COMMUNITY
Start: 2019-01-17 | End: 2019-10-16 | Stop reason: SDUPTHER

## 2019-01-24 RX ORDER — UBIDECARENONE 200 MG
1 CAPSULE ORAL
COMMUNITY

## 2019-01-24 SDOH — ECONOMIC STABILITY - INCOME SECURITY: OTHER PROBLEMS RELATED TO HOUSING AND ECONOMIC CIRCUMSTANCES: Z59.89

## 2019-01-24 NOTE — PATIENT INSTRUCTIONS
Shopping for a Healthy Diet   WHAT YOU NEED TO KNOW:   Why is shopping for healthy foods important? You may be more likely to follow a healthy meal plan if you have healthy foods available in your home  A healthy meal plan includes a variety of healthy foods from all the food groups  It is also low in unhealthy fats, salt, and added sugar  Healthy foods may decrease your risk for heart disease, osteoporosis (brittle bones), and some types of cancer  What is a healthy meal plan? My Plate is a model for planning healthy meals  It shows the types and amounts of foods that should go on your plate  Fruits and vegetables make up about half of your plate, and grains and protein make up the other half  A serving of dairy is also included  The amount of calories and serving sizes you need depends on your age, gender, weight, and height  Examples of healthy foods are listed below:  · Eat a variety of vegetables  such as dark green, red, and orange vegetables  You can also include canned vegetables low in sodium (salt) and frozen vegetables without added butter or sauces  · Eat a variety of fresh fruits , canned fruit in 100% juice, frozen fruit, and dried fruit  · Include whole grains  At least half of the grains you eat should be whole grains  Examples include whole wheat bread, wheat pasta, brown rice, and whole grain cereals such as oatmeal     · Eat a variety of protein foods such as seafood (fish and shellfish), lean meat, and poultry without skin (turkey and chicken)  Examples of lean meats include pork leg, shoulder, or tenderloin, and beef round, sirloin, tenderloin, and extra lean ground beef  Other protein foods include eggs and egg substitutes, beans, peas, soy products, nuts, and seeds  · Choose low-fat dairy products such as skim or 1% milk or low-fat yogurt, cheese, and cottage cheese  What should I do before I go shopping? · Plan your meals        ¨ Plan your shopping around healthy meals and recipes that you will prepare for the week  Switch ingredients in recipes to lower the total fat and calorie content  For example, if a recipe calls for milk, you can add nonfat or 1% milk instead of whole milk  Use egg substitute in a recipe that calls for whole eggs  Make a list of the foods you will need for your planned meals  ¨ Plan healthy meals that can be made quickly on days when you are extra busy  Another idea is to plan meals that you can make in large batches and freeze  You can thaw and eat these meals on days when you do not have time to cook  For example, spaghetti can be made in several batches at once and frozen  · Find ways to make affordable healthy choices  Look through [employer] ads for sales  Buy fruits and vegetables when they are in season or buy them at your local farmer's market  · Eat before you go shopping  Shopping on an empty stomach may cause you to buy unhealthy foods because you feel hungry  What are some tips for making healthy choices while I am shopping? · Know where to find healthy foods in the grocery store  Go to the food sections that are found along the walls of the store first  Healthy foods, such as fruits, vegetables, bread, dairy products, meat, and fish, are usually placed in these areas  The inner aisles have other healthy foods, such as canned and frozen fruits and vegetables, and cereals  However, there are also other less healthy foods in the inner aisles  Some of these foods include packaged foods, snack foods, and desserts  Shopping in the inner aisles last may help you buy fewer of these foods  · Read food labels  Use the nutrition information on food labels to help you make healthier food choices when you shop  ¨ The serving size  is usually listed in cups or pieces and may include a weight (grams, ounces)  It also shows the number of servings that are in a package  Compare the amount that you will eat to the serving size listed   If the package contains 2 servings and you eat the whole package, then you will eat twice the amount of calories and nutrients listed  ¨ The nutrients  listed on the top section of the label are fat, saturated fat, trans fat, cholesterol, and sodium  Limit these nutrients because eating too much may increase your risk of certain diseases  The total carbohydrates, fiber, and sugars are also listed  The nutrients you should try to get enough of include dietary fiber, vitamin A, vitamin C, calcium, and iron  These nutrients may help you to reduce your risk of diseases such as osteoporosis and heart disease  ¨ The % daily value  (DV) listed on the food label next to the nutrients tells you if a food is low or high in these nutrients  A percent DV of 5% or less means that the food is low in that nutrient  A percent DV of 20% or more means that the food is high in that nutrient  · Keep food safety in mind  Keep raw, cooked, and ready-to-eat foods separate in your shopping cart  Place raw seafood, meat, and poultry in plastic bags to prevent leakage of juices to other foods  Foods that need to be refrigerated or frozen should be taken home and stored within 2 hours  This prevents the growth of bacteria that can lead to food poisoning  CARE AGREEMENT:   You have the right to help plan your care  Discuss treatment options with your caregivers to decide what care you want to receive  You always have the right to refuse treatment  The above information is an  only  It is not intended as medical advice for individual conditions or treatments  Talk to your doctor, nurse or pharmacist before following any medical regimen to see if it is safe and effective for you  © 2017 [address] [employer] Information is for End User's use only and may not be sold, redistributed or otherwise used for commercial purposes   All illustrations and images included in CareNotes® are the copyrighted property of [employer]  or Freddy Lester  The patient is asked to make an attempt to improve diet and exercise patterns to aid in medical management of this problem

## 2019-01-24 NOTE — PROGRESS NOTES
Assessment/Plan:         Diagnoses and all orders for this visit:    Essential hypertension  Comments:  stable, controlled, cpm    Atrial fibrillation, unspecified type ([address] )  Comments:  RRR on exam today, cpm and monitoring    Morbid obesity ([address] )  Comments:  see printed plan to obtain goal    Flu vaccine need  -     SYRINGE/SINGLE-DOSE VIAL: influenza vaccine, [id number], quadrivalent, 0 5 mL, preservative-free (AFLURIA, FLUARIX, FLULAVAL, FLUZONE)    Has health insurance with inadequate coverage of health expenses    Colonoscopy refused  Comments:  also refuses FIT screen due to high deductible and financial hardship            Subjective:   Chief Complaint   Patient presents with    Follow-up     Brought blood pressure readings - declines colonoscopy and FIT test at this time         Patient ID: Parag Mello is a 61 y o  female  Per c/c reviewed by me   here for routine f/u appt   no interval acute problems or illnesses      The following portions of the patient's history were reviewed and updated as appropriate: allergies, current medications, past family history, past medical history, past social history, past surgical history and problem list     Review of Systems   Constitutional: Negative  HENT: Negative for mouth sores, nosebleeds and trouble swallowing  Eyes: Negative  Respiratory: Negative  Cardiovascular: Negative  Gastrointestinal: Negative  Endocrine: Negative  Genitourinary: Negative for decreased urine volume, difficulty urinating, dysuria, frequency and hematuria  Musculoskeletal: Negative for gait problem and joint swelling  Unchanged right scapular pain triggered by working with right arm, relieved by rest   Skin: Negative  Neurological: Negative  Hematological: Negative            Objective:      /82 (BP Location: Left arm, Patient Position: Sitting, Cuff Size: Large)   Pulse 76   Temp 97 8 °F (36 6 °C) (Tympanic)   Resp 17   Ht 5' 8" (1 727 m)   Wt 126 kg (278 lb)   SpO2 98%   BMI 42 27 kg/m²          Physical Exam   Constitutional: She is oriented to person, place, and time  She appears well-developed  She is cooperative  Non-toxic appearance  She does not have a sickly appearance  She does not appear ill  No distress  HENT:   Head: Normocephalic and atraumatic  Mouth/Throat: Uvula is midline, oropharynx is clear and moist and mucous membranes are normal    Eyes: Pupils are equal, round, and reactive to light  Conjunctivae and lids are normal    Neck: Trachea normal  Neck supple  No JVD present  No thyroid mass and no thyromegaly present  Cardiovascular: Normal rate, regular rhythm, normal heart sounds and normal pulses  Pulmonary/Chest: Effort normal and breath sounds normal    Abdominal: Soft  Bowel sounds are normal  She exhibits no distension and no mass  There is no hepatosplenomegaly  There is no tenderness  Lymphadenopathy:     She has no cervical adenopathy  Right: No supraclavicular adenopathy present  Left: No supraclavicular adenopathy present  Neurological: She is alert and oriented to person, place, and time  She has normal reflexes  Gait normal    Skin: Skin is warm and dry  She is not diaphoretic  No cyanosis  No pallor  Psychiatric: She has a normal mood and affect  Her behavior is normal    Nursing note and vitals reviewed

## 2019-01-30 DIAGNOSIS — I10 ESSENTIAL HYPERTENSION: ICD-10-CM

## 2019-01-30 RX ORDER — METOPROLOL TARTRATE 50 MG/1
TABLET, FILM COATED ORAL
Qty: 180 TABLET | Refills: 0 | Status: SHIPPED | OUTPATIENT
Start: 2019-01-30 | End: 2019-05-02 | Stop reason: SDUPTHER

## 2019-03-22 DIAGNOSIS — I10 ESSENTIAL HYPERTENSION: ICD-10-CM

## 2019-03-23 DIAGNOSIS — I10 ESSENTIAL HYPERTENSION: ICD-10-CM

## 2019-03-25 RX ORDER — AMLODIPINE BESYLATE 10 MG/1
TABLET ORAL
Qty: 90 TABLET | Refills: 0 | OUTPATIENT
Start: 2019-03-25

## 2019-03-25 RX ORDER — AMLODIPINE BESYLATE 10 MG/1
10 TABLET ORAL DAILY
Qty: 90 TABLET | Refills: 0 | Status: SHIPPED | OUTPATIENT
Start: 2019-03-25 | End: 2019-06-19 | Stop reason: SDUPTHER

## 2019-05-02 DIAGNOSIS — I10 ESSENTIAL HYPERTENSION: ICD-10-CM

## 2019-05-04 RX ORDER — METOPROLOL TARTRATE 50 MG/1
TABLET, FILM COATED ORAL
Qty: 180 TABLET | Refills: 0 | Status: SHIPPED | OUTPATIENT
Start: 2019-05-04 | End: 2019-08-08 | Stop reason: SDUPTHER

## 2019-06-19 DIAGNOSIS — I10 ESSENTIAL HYPERTENSION: ICD-10-CM

## 2019-06-20 RX ORDER — AMLODIPINE BESYLATE 10 MG/1
TABLET ORAL
Qty: 90 TABLET | Refills: 0 | Status: SHIPPED | OUTPATIENT
Start: 2019-06-20 | End: 2020-02-26 | Stop reason: SDUPTHER

## 2019-08-05 DIAGNOSIS — I10 ESSENTIAL HYPERTENSION: ICD-10-CM

## 2019-08-08 ENCOUNTER — OFFICE VISIT (OUTPATIENT)
Dept: FAMILY MEDICINE CLINIC | Facility: CLINIC | Age: 64
End: 2019-08-08
Payer: COMMERCIAL

## 2019-08-08 VITALS
TEMPERATURE: 97.7 F | WEIGHT: 286 LBS | HEART RATE: 80 BPM | OXYGEN SATURATION: 97 % | RESPIRATION RATE: 17 BRPM | BODY MASS INDEX: 43.49 KG/M2 | SYSTOLIC BLOOD PRESSURE: 130 MMHG | DIASTOLIC BLOOD PRESSURE: 86 MMHG

## 2019-08-08 DIAGNOSIS — E66.01 MORBID OBESITY (HCC): ICD-10-CM

## 2019-08-08 DIAGNOSIS — R73.01 ELEVATED FASTING GLUCOSE: ICD-10-CM

## 2019-08-08 DIAGNOSIS — Z11.59 NEED FOR HEPATITIS C SCREENING TEST: ICD-10-CM

## 2019-08-08 DIAGNOSIS — I10 ESSENTIAL HYPERTENSION: Primary | ICD-10-CM

## 2019-08-08 DIAGNOSIS — Z59.89 HAS HEALTH INSURANCE WITH INADEQUATE COVERAGE OF HEALTH EXPENSES: ICD-10-CM

## 2019-08-08 PROCEDURE — 99214 OFFICE O/P EST MOD 30 MIN: CPT | Performed by: FAMILY MEDICINE

## 2019-08-08 PROCEDURE — 3075F SYST BP GE 130 - 139MM HG: CPT | Performed by: FAMILY MEDICINE

## 2019-08-08 RX ORDER — METOPROLOL TARTRATE 50 MG/1
TABLET, FILM COATED ORAL
Qty: 180 TABLET | Refills: 0 | OUTPATIENT
Start: 2019-08-08

## 2019-08-08 RX ORDER — METOPROLOL TARTRATE 50 MG/1
50 TABLET, FILM COATED ORAL 2 TIMES DAILY
Qty: 180 TABLET | Refills: 1 | Status: SHIPPED | OUTPATIENT
Start: 2019-08-08 | End: 2020-02-26 | Stop reason: SDUPTHER

## 2019-08-08 SDOH — ECONOMIC STABILITY - INCOME SECURITY: OTHER PROBLEMS RELATED TO HOUSING AND ECONOMIC CIRCUMSTANCES: Z59.89

## 2019-08-08 NOTE — PROGRESS NOTES
Assessment/Plan:         Diagnoses and all orders for this visit:    Essential hypertension  Comments:  stable, cpm  Orders:  -     metoprolol tartrate (LOPRESSOR) 50 mg tablet; Take 1 tablet (50 mg total) by mouth 2 (two) times a day  -     Comprehensive metabolic panel; Future    Morbid obesity ([address] )  -     Lipid panel; Future    Elevated fasting glucose  Comments:  pt has been trying diet control measures, recheck labs per orders  Orders:  -     Comprehensive metabolic panel; Future  -     Lipid panel; Future  -     HEMOGLOBIN A1C W/ EAG ESTIMATION; Future    Has health insurance with inadequate coverage of health expenses    Need for hepatitis C screening test  -     Hepatitis C antibody; Future          Subjective:   Chief Complaint   Patient presents with    Follow-up        Patient ID: Leslie Luke is a 59 y o  female  Scheduled f/u visit  Only c/o today is "I gained weight"  No interval acute problems or illnesses  Home bp's have been good per pt        The following portions of the patient's history were reviewed and updated as appropriate: allergies, current medications, past family history, past medical history, past social history, past surgical history and problem list     Review of Systems   Constitutional: Negative  HENT: Negative for mouth sores, nosebleeds, sore throat and trouble swallowing  Eyes: Negative  Respiratory: Negative  Cardiovascular: Negative  Gastrointestinal: Negative  Endocrine: Negative  Genitourinary: Negative for decreased urine volume, difficulty urinating, dysuria, flank pain, frequency and hematuria  Skin: Negative  Neurological: Negative  Hematological: Negative  Objective:      /86   Pulse 80   Temp 97 7 °F (36 5 °C)   Resp 17   Wt 130 kg (286 lb)   SpO2 97%   BMI 43 49 kg/m²          Physical Exam   Constitutional: She is oriented to person, place, and time  She appears well-developed  She is cooperative  Non-toxic appearance  She does not have a sickly appearance  She does not appear ill  No distress  HENT:   Head: Normocephalic and atraumatic  Mouth/Throat: Uvula is midline, oropharynx is clear and moist and mucous membranes are normal    Eyes: Pupils are equal, round, and reactive to light  Conjunctivae and lids are normal    Neck: Trachea normal  Neck supple  No JVD present  No thyroid mass and no thyromegaly present  Cardiovascular: Normal rate, regular rhythm, normal heart sounds and normal pulses  Pulmonary/Chest: Effort normal and breath sounds normal    Abdominal: Soft  Bowel sounds are normal  She exhibits no distension and no mass  There is no hepatosplenomegaly  There is no tenderness  Lymphadenopathy:     She has no cervical adenopathy  Right: No supraclavicular adenopathy present  Left: No supraclavicular adenopathy present  Neurological: She is alert and oriented to person, place, and time  She has normal reflexes  Gait normal    Skin: Skin is warm and dry  She is not diaphoretic  No cyanosis  No pallor  Psychiatric: She has a normal mood and affect  Her behavior is normal    Nursing note and vitals reviewed  BMI Counseling: Body mass index is 43 49 kg/m²  Discussed the patient's BMI with her  The BMI is above average  BMI counseling and education was provided to the patient  Nutrition recommendations include reducing portion sizes, decreasing overall calorie intake and moderation in carbohydrate intake  Exercise recommendations include moderate aerobic physical activity for 150 minutes/week

## 2019-08-08 NOTE — PATIENT INSTRUCTIONS
Check on insurance coverage of Prevnar-13 pneumonia vaccine  Heart Healthy Diet   AMBULATORY CARE:   A heart healthy diet  is an eating plan low in total fat, unhealthy fats, and sodium (salt)  A heart healthy diet helps decrease your risk for heart disease and stroke  Limit the amount of fat you eat to 25% to 35% of your total daily calories  Limit sodium to less than 2,300 mg each day  Healthy fats:  Healthy fats can help improve cholesterol levels  The risk for heart disease is decreased when cholesterol levels are normal  Choose healthy fats, such as the following:  · Unsaturated fat  is found in foods such as soybean, canola, olive, corn, and safflower oils  It is also found in soft tub margarine that is made with liquid vegetable oil  · Omega-3 fat  is found in certain fish, such as salmon, tuna, and trout, and in walnuts and flaxseed  Unhealthy fats:  Unhealthy fats can cause unhealthy cholesterol levels in your blood and increase your risk of heart disease  Limit unhealthy fats, such as the following:  · Cholesterol  is found in animal foods, such as eggs and lobster, and in dairy products made from whole milk  Limit cholesterol to less than 300 milligrams (mg) each day  You may need to limit cholesterol to 200 mg each day if you have heart disease  · Saturated fat  is found in meats, such as peace and hamburger  It is also found in chicken or turkey skin, whole milk, and butter  Limit saturated fat to less than 7% of your total daily calories  Limit saturated fat to less than 6% if you have heart disease or are at increased risk for it  · Trans fat  is found in packaged foods, such as potato chips and cookies  It is also in hard margarine, some fried foods, and shortening  Avoid trans fats as much as possible    Heart healthy foods and drinks to include:  Ask your dietitian or healthcare provider how many servings to have from each of the following food groups:  · Grains:      ¨ Whole-wheat breads, cereals, and pastas, and brown rice    ¨ Low-fat, low-sodium crackers and chips    · Vegetables:      ¨ Broccoli, green beans, green peas, and spinach    ¨ Collards, kale, and lima beans    ¨ Carrots, sweet potatoes, tomatoes, and peppers    ¨ Canned vegetables with no salt added    · Fruits:      ¨ Bananas, peaches, pears, and pineapple    ¨ Grapes, raisins, and dates    ¨ Oranges, tangerines, grapefruit, orange juice, and grapefruit juice    ¨ Apricots, mangoes, melons, and papaya    ¨ Raspberries and strawberries    ¨ Canned fruit with no added sugar    · Low-fat dairy products:      ¨ Nonfat (skim) milk, 1% milk, and low-fat almond, cashew, or soy milks fortified with calcium    ¨ Low-fat cheese, regular or frozen yogurt, and cottage cheese    · Meats and proteins , such as lean cuts of beef and pork (loin, leg, round), skinless chicken and turkey, legumes, soy products, egg whites, and nuts  Foods and drinks to limit or avoid:  Ask your dietitian or healthcare provider about these and other foods that are high in unhealthy fat, sodium, and sugar:  · Snack or packaged foods , such as frozen dinners, cookies, macaroni and cheese, and cereals with more than 300 mg of sodium per serving    · Canned or dry mixes  for cakes, soups, sauces, or gravies    · Vegetables with added sodium , such as instant potatoes, vegetables with added sauces, or regular canned vegetables    · Other foods high in sodium , such as ketchup, barbecue sauce, salad dressing, pickles, olives, soy sauce, and miso    · High-fat dairy foods  such as whole or 2% milk, cream cheese, or sour cream, and cheeses     · High-fat protein foods  such as high-fat cuts of beef (T-bone steaks, ribs), chicken or turkey with skin, and organ meats, such as liver    · Cured or smoked meats , such as hot dogs, peace, and sausage    · Unhealthy fats and oils , such as butter, stick margarine, shortening, and cooking oils such as coconut or palm oil    · Food and drinks high in sugar , such as soft drinks (soda), sports drinks, sweetened tea, candy, cake, cookies, pies, and doughnuts  Other diet guidelines to follow:   · Eat more foods containing omega-3 fats  Eat fish high in omega-3 fats at least 2 times a week  · Limit alcohol  Too much alcohol can damage your heart and raise your blood pressure  Women should limit alcohol to 1 drink a day  Men should limit alcohol to 2 drinks a day  A drink of alcohol is 12 ounces of beer, 5 ounces of wine, or 1½ ounces of liquor  · Choose low-sodium foods  High-sodium foods can lead to high blood pressure  Add little or no salt to food you prepare  Use herbs and spices in place of salt  · Eat more fiber  to help lower cholesterol levels  Eat at least 5 servings of fruits and vegetables each day  Eat 3 ounces of whole-grain foods each day  Legumes (beans) are also a good source of fiber  Lifestyle guidelines:   · Do not smoke  Nicotine and other chemicals in cigarettes and cigars can cause lung and heart damage  Ask your healthcare provider for information if you currently smoke and need help to quit  E-cigarettes or smokeless tobacco still contain nicotine  Talk to your healthcare provider before you use these products  · Exercise regularly  to help you maintain a healthy weight and improve your blood pressure and cholesterol levels  Ask your healthcare provider about the best exercise plan for you  Do not start an exercise program without asking your healthcare provider  Follow up with your healthcare provider as directed:  Write down your questions so you remember to ask them during your visits  © 2017 [facility] Information is for End User's use only and may not be sold, redistributed or otherwise used for commercial purposes  All illustrations and images included in CareNotes® are the copyrighted property of [employer]  or Freddy L[employer]    The above information is an  only  It is not intended as medical advice for individual conditions or treatments  Talk to your doctor, nurse or pharmacist before following any medical regimen to see if it is safe and effective for you

## 2019-10-16 DIAGNOSIS — I10 ESSENTIAL HYPERTENSION: Primary | ICD-10-CM

## 2019-10-18 RX ORDER — LISINOPRIL AND HYDROCHLOROTHIAZIDE 25; 20 MG/1; MG/1
1 TABLET ORAL EVERY MORNING
Qty: 90 TABLET | Refills: 1 | Status: SHIPPED | OUTPATIENT
Start: 2019-10-18 | End: 2020-04-13 | Stop reason: SDUPTHER

## 2020-02-26 DIAGNOSIS — I10 ESSENTIAL HYPERTENSION: ICD-10-CM

## 2020-02-26 NOTE — TELEPHONE ENCOUNTER
Patient called for two reasons:    1)  Refills for Metoprolol 50 mg and Amlodipine 5 mg (she doesn't want to cut the 10's in half any longer) to [facility]  2) She made a follow up appointment for 4/15/20 (she needs late in the day)  Patient stated she has a medical discount card but neither her nor I knew what that means  She turns 65 on May 31 so she will be starting with Medicare  I explained the self pay plan to her for the office visit and patient understood and was ok with the $25 copay  I stressed the importance of her being seen for future medication refills  She is concerned about the lab work  She has no idea how much it will cost and does not want a bill for thousands of dollars  Patient stated she does not use a computer or the internet  I stated I would do some research for her on lab costs based on what has been ordered for her and mail her the information  Hopefully it will be affordable for her to get labs done prior to April 15  However, it may have to wait until her Medicare begins  Patient is very pleasant and was very thankful for all the help  She expressed that she wants to keep having her family doctor  Thank you

## 2020-02-27 RX ORDER — AMLODIPINE BESYLATE 5 MG/1
10 TABLET ORAL DAILY
Qty: 90 TABLET | Refills: 1 | Status: SHIPPED | OUTPATIENT
Start: 2020-02-27 | End: 2020-05-22

## 2020-02-27 RX ORDER — METOPROLOL TARTRATE 50 MG/1
50 TABLET, FILM COATED ORAL 2 TIMES DAILY
Qty: 180 TABLET | Refills: 1 | Status: SHIPPED | OUTPATIENT
Start: 2020-02-27 | End: 2020-08-24

## 2020-02-27 NOTE — TELEPHONE ENCOUNTER
I printed off [employer], checked off what is pending for the patient, and total came to $125 to be paid at time of lab appointment through this program   I also printed off the lab orders and attached then mailed all the information to the patient  Thank you

## 2020-04-13 DIAGNOSIS — I10 ESSENTIAL HYPERTENSION: ICD-10-CM

## 2020-04-16 RX ORDER — LISINOPRIL AND HYDROCHLOROTHIAZIDE 25; 20 MG/1; MG/1
1 TABLET ORAL EVERY MORNING
Qty: 90 TABLET | Refills: 0 | Status: SHIPPED | OUTPATIENT
Start: 2020-04-16 | End: 2020-07-10

## 2020-05-21 DIAGNOSIS — I10 ESSENTIAL HYPERTENSION: ICD-10-CM

## 2020-05-22 RX ORDER — AMLODIPINE BESYLATE 5 MG/1
TABLET ORAL
Qty: 180 TABLET | Refills: 0 | Status: SHIPPED | OUTPATIENT
Start: 2020-05-22 | End: 2020-12-23

## 2020-07-10 DIAGNOSIS — I10 ESSENTIAL HYPERTENSION: ICD-10-CM

## 2020-07-10 RX ORDER — LISINOPRIL AND HYDROCHLOROTHIAZIDE 25; 20 MG/1; MG/1
TABLET ORAL
Qty: 30 TABLET | Refills: 0 | Status: SHIPPED | OUTPATIENT
Start: 2020-07-10 | End: 2020-08-05

## 2020-08-04 DIAGNOSIS — I10 ESSENTIAL HYPERTENSION: ICD-10-CM

## 2020-08-05 RX ORDER — LISINOPRIL AND HYDROCHLOROTHIAZIDE 25; 20 MG/1; MG/1
TABLET ORAL
Qty: 30 TABLET | Refills: 0 | Status: SHIPPED | OUTPATIENT
Start: 2020-08-05 | End: 2020-08-24

## 2020-08-14 ENCOUNTER — OFFICE VISIT (OUTPATIENT)
Dept: FAMILY MEDICINE CLINIC | Facility: CLINIC | Age: 65
End: 2020-08-14
Payer: COMMERCIAL

## 2020-08-14 VITALS
RESPIRATION RATE: 19 BRPM | HEIGHT: 69 IN | DIASTOLIC BLOOD PRESSURE: 88 MMHG | SYSTOLIC BLOOD PRESSURE: 140 MMHG | TEMPERATURE: 98 F | OXYGEN SATURATION: 98 % | WEIGHT: 287 LBS | HEART RATE: 78 BPM | BODY MASS INDEX: 42.51 KG/M2

## 2020-08-14 DIAGNOSIS — E66.01 MORBID OBESITY (HCC): ICD-10-CM

## 2020-08-14 DIAGNOSIS — Z13.29 THYROID DISORDER SCREEN: ICD-10-CM

## 2020-08-14 DIAGNOSIS — Z13.220 LIPID SCREENING: ICD-10-CM

## 2020-08-14 DIAGNOSIS — I48.91 ATRIAL FIBRILLATION, UNSPECIFIED TYPE (HCC): ICD-10-CM

## 2020-08-14 DIAGNOSIS — Z00.00 WELCOME TO MEDICARE PREVENTIVE VISIT: Primary | ICD-10-CM

## 2020-08-14 DIAGNOSIS — I10 ESSENTIAL HYPERTENSION: ICD-10-CM

## 2020-08-14 DIAGNOSIS — N18.30 STAGE 3 CHRONIC KIDNEY DISEASE (HCC): ICD-10-CM

## 2020-08-14 DIAGNOSIS — Z78.0 POSTMENOPAUSAL: ICD-10-CM

## 2020-08-14 DIAGNOSIS — Z11.59 NEED FOR HEPATITIS C SCREENING TEST: ICD-10-CM

## 2020-08-14 DIAGNOSIS — Z23 IMMUNIZATION DUE: ICD-10-CM

## 2020-08-14 DIAGNOSIS — Z13.0 SCREENING FOR DEFICIENCY ANEMIA: ICD-10-CM

## 2020-08-14 DIAGNOSIS — Z11.4 SCREENING FOR HIV (HUMAN IMMUNODEFICIENCY VIRUS): ICD-10-CM

## 2020-08-14 DIAGNOSIS — Z12.31 BREAST CANCER SCREENING BY MAMMOGRAM: ICD-10-CM

## 2020-08-14 DIAGNOSIS — Z12.11 COLON CANCER SCREENING: ICD-10-CM

## 2020-08-14 DIAGNOSIS — R73.01 ELEVATED FASTING GLUCOSE: ICD-10-CM

## 2020-08-14 PROCEDURE — 3008F BODY MASS INDEX DOCD: CPT | Performed by: FAMILY MEDICINE

## 2020-08-14 PROCEDURE — 3077F SYST BP >= 140 MM HG: CPT | Performed by: FAMILY MEDICINE

## 2020-08-14 PROCEDURE — 90670 PCV13 VACCINE IM: CPT | Performed by: FAMILY MEDICINE

## 2020-08-14 PROCEDURE — 3725F SCREEN DEPRESSION PERFORMED: CPT | Performed by: FAMILY MEDICINE

## 2020-08-14 PROCEDURE — 4040F PNEUMOC VAC/ADMIN/RCVD: CPT | Performed by: FAMILY MEDICINE

## 2020-08-14 PROCEDURE — 3079F DIAST BP 80-89 MM HG: CPT | Performed by: FAMILY MEDICINE

## 2020-08-14 PROCEDURE — G0402 INITIAL PREVENTIVE EXAM: HCPCS | Performed by: FAMILY MEDICINE

## 2020-08-14 PROCEDURE — 1036F TOBACCO NON-USER: CPT | Performed by: FAMILY MEDICINE

## 2020-08-14 PROCEDURE — G0009 ADMIN PNEUMOCOCCAL VACCINE: HCPCS | Performed by: FAMILY MEDICINE

## 2020-08-14 NOTE — PROGRESS NOTES
Problem List Items Addressed This Visit        Cardiovascular and Mediastinum    Atrial fibrillation ([facility] [address] )    Relevant Orders    PNEUMOCOCCAL CONJUGATE VACCINE 13-VALENT GREATER THAN 6 MONTHS    Ambulatory referral to Cardiology    TSH, 3rd generation with Free T4 reflex    Essential hypertension    Relevant Orders    Ambulatory referral to Nephrology    Ambulatory referral to Cardiology    Comprehensive metabolic panel    Microalbumin / creatinine urine ratio       Genitourinary    Stage 3 chronic kidney disease ([facility] [address] )    Relevant Orders    PNEUMOCOCCAL CONJUGATE VACCINE 13-VALENT GREATER THAN 6 MONTHS    Ambulatory referral to Nephrology    Comprehensive metabolic panel    CBC and differential    TSH, 3rd generation with Free T4 reflex       Other    Morbid obesity ([facility])    Elevated fasting glucose    Relevant Orders    Comprehensive metabolic panel    HEMOGLOBIN A1C W/ EAG ESTIMATION      Other Visit Diagnoses     Welcome to Medicare preventive visit    -  Primary    Breast cancer screening by mammogram        Relevant Orders    Mammo screening bilateral w 3d & cad    Immunization due        Relevant Orders    PNEUMOCOCCAL CONJUGATE VACCINE 13-VALENT GREATER THAN 6 MONTHS    Postmenopausal        Relevant Orders    DXA bone density spine hip and pelvis    Colon cancer screening        Relevant Orders    Occult Bloood,Fecal Immunochemical    Lipid screening        Relevant Orders    Lipid panel    Screening for deficiency anemia        Relevant Orders    CBC and differential    Thyroid disorder screen        Relevant Orders    TSH, 3rd generation with Free T4 reflex    Need for hepatitis C screening test        Relevant Orders    Hepatitis C antibody    Screening for HIV (human immunodeficiency virus)        Relevant Orders    HIV 1/2 Antigen/Antibody (4th Generation) w Reflex [facility]           Preventive health issues were discussed with patient, and age appropriate screening tests were ordered as noted in patient's After Visit Summary  Personalized health advice and appropriate referrals for health education or preventive services given if needed, as noted in patient's After Visit Summary  History of Present Illness:     Patient presents for Medicare Annual Wellness visit    Patient Care Team:  Cassidy Castillo DO as PCP - General  Price Tanner DO     Problem List:     Patient Active Problem List   Diagnosis    Atrial fibrillation [facility] [address] )    Stage 3 chronic kidney disease ([facility] [address] )    Essential hypertension    Morbid obesity ([facility] [address] )    Multiple renal cysts    Pain of right scapula    Has health insurance with inadequate coverage of health expenses    Colonoscopy refused    Elevated fasting glucose      Past Medical and Surgical History:     Past Medical History:   Diagnosis Date    Hypokalemia     Last Assessed: 8/12/2016     Hyponatremia     Last Assessed: 8/12/2016     Irregular heartbeat     A Flutter 7/2015 while in hospital for pneumonia, resolved     Pneumonia      History reviewed  No pertinent surgical history     Family History:     Family History   Problem Relation Age of Onset    Colon cancer Mother     Cancer Mother     Tongue cancer Father     Hypertension Father     Cancer Father     Other Sister         Epilepsy     Hypertension Sister     Colon cancer Brother     Lung cancer Brother         secondary     Hypertension Brother     Cancer Brother       Social History:        Social History     Socioeconomic History    Marital status: Single     Spouse name: None    Number of children: None    Years of education: None    Highest education level: None   Occupational History    None   Social Needs    Financial resource strain: None    Food insecurity     Worry: None     Inability: None    Transportation needs     Medical: None     Non-medical: None   Tobacco Use    Smoking status: Never Smoker    Smokeless tobacco: Never Used   Substance and Sexual Activity    Alcohol use: No    Drug use: No    Sexual activity: None   Lifestyle    Physical activity     Days per week: None     Minutes per session: None    Stress: None   Relationships    Social connections     Talks on phone: None     Gets together: None     Attends Protestant service: None     Active member of club or organization: None     Attends meetings of clubs or organizations: None     Relationship status: None    Intimate partner violence     Fear of current or ex partner: None     Emotionally abused: None     Physically abused: None     Forced sexual activity: None   Other Topics Concern    None   Social History Narrative    None      Medications and Allergies:     Current Outpatient Medications   Medication Sig Dispense Refill    amLODIPine (NORVASC) 5 mg tablet TAKE 2 TABLETS BY MOUTH EVERY DAY (Patient taking differently: Take 5 mg by mouth daily ) 180 tablet 0    aspirin 81 MG tablet Take 1 tablet by mouth daily      Coenzyme Q-10 200 MG CAPS Take 1 capsule by mouth      Cranberry 500 MG CAPS Take 1 capsule by mouth daily      lisinopril-hydrochlorothiazide (PRINZIDE,ZESTORETIC) 20-25 MG per tablet TAKE 1 TABLET BY MOUTH EVERY DAY IN THE MORNING 30 tablet 0    metoprolol tartrate (LOPRESSOR) 50 mg tablet Take 1 tablet (50 mg total) by mouth 2 (two) times a day 180 tablet 1    Multiple Vitamins-Minerals (DAILY MULTIPLE VITAMINS/MIN PO) Take 1 tablet by mouth daily      Omega-3 Fatty Acids (FISH OIL) 1,000 mg Take 1 capsule by mouth daily      Protein (NUTRA/PRO CHOCOLATE PO) Take by mouth      Cholecalciferol (VITAMIN D3) 2000 units capsule Take 1 capsule by mouth daily       No current facility-administered medications for this visit        Allergies   Allergen Reactions    Seasonal Ic  [Cholestatin]       Immunizations:     Immunization History   Administered Date(s) Administered    Influenza Quadrivalent Preservative Free 3 years and older IM 11/03/2016, 12/14/2017    Influenza, injectable, quadrivalent, preservative free 0 5 mL 01/24/2019    Tdap 03/03/2016      Health Maintenance:         Topic Date Due    Hepatitis C Screening  1955    MAMMOGRAM  1955         Topic Date Due    Pneumococcal Vaccine: 65+ Years (1 of 1 - PPSV23) 05/31/2020    Influenza Vaccine  07/01/2020      Medicare Health Risk Assessment:     /88   Pulse 78   Temp 98 °F (36 7 °C)   Resp 19   Ht 5' 8 5" (1 74 m)   Wt 130 kg (287 lb)   SpO2 98%   BMI 43 00 kg/m²      Ana Campa is here for her Welcome to Medicare visit  Health Risk Assessment:   Patient rates overall health as good  Patient feels that their physical health rating is same  Eyesight was rated as same  Hearing was rated as same  Patient feels that their emotional and mental health rating is same  Pain experienced in the last 7 days has been none  Patient states that she has experienced no weight loss or gain in last 6 months  Depression Screening:   PHQ-2 Score: 0      Fall Risk Screening: In the past year, patient has experienced: no history of falling in past year      Urinary Incontinence Screening:   Patient has not leaked urine accidently in the last six months  Home Safety:  Patient does not have trouble with stairs inside or outside of their home  Patient has working smoke alarms and has working carbon monoxide detector  Home safety hazards include: none  Nutrition:   Current diet is Regular  Medications:   Patient is currently taking over-the-counter supplements  OTC medications include: see medication list  Patient is able to manage medications  Activities of Daily Living (ADLs)/Instrumental Activities of Daily Living (IADLs):   Walk and transfer into and out of bed and chair?: Yes  Dress and groom yourself?: Yes    Bathe or shower yourself?: Yes    Feed yourself?  Yes  Do your laundry/housekeeping?: Yes  Manage your money, pay your bills and track your expenses?: Yes  Make your own meals?: Yes    Do your own shopping?: Yes    Previous Hospitalizations:   Any hospitalizations or ED visits within the last 12 months?: No      Advance Care Planning:   Living will: No    Durable POA for healthcare: No    Advanced directive: No    Five wishes given: Yes      Cognitive Screening:   Provider or family/friend/caregiver concerned regarding cognition?: No    PREVENTIVE SCREENINGS      Cardiovascular Screening:    General: Screening Current      Diabetes Screening:     General: Risks and Benefits Discussed    Due for: Blood Glucose      Colorectal Cancer Screening:     General: Risks and Benefits Discussed    Due for: FOBT/FIT      Breast Cancer Screening:     General: Risks and Benefits Discussed    Due for: Mammogram        Cervical Cancer Screening:    General: Screening Not Indicated      Osteoporosis Screening:    General: Risks and Benefits Discussed    Due for: DXA Axial      Abdominal Aortic Aneurysm (AAA) Screening:        General: Screening Not Indicated      Lung Cancer Screening:     General: Screening Not Indicated      Hepatitis C Screening:    General: Risks and Benefits Discussed    Hep C Screening Accepted: Yes        Steve Sutton DO  BMI Counseling: Body mass index is 43 kg/m²  The BMI is above normal  Nutrition recommendations include 3-5 servings of fruits/vegetables daily  Exercise recommendations include exercising 3-5 times per week

## 2020-08-14 NOTE — PATIENT INSTRUCTIONS
Medicare Preventive Visit Patient Instructions  Thank you for completing your Welcome to Medicare Visit or Medicare Annual Wellness Visit today  Your next wellness visit will be due in one year (8/14/2021)  The screening/preventive services that you may require over the next 5-10 years are detailed below  Some tests may not apply to you based off risk factors and/or age  Screening tests ordered at today's visit but not completed yet may show as past due  Also, please note that scanned in results may not display below  Preventive Screenings:  Service Recommendations Previous Testing/Comments   Colorectal Cancer Screening  * Colonoscopy    * Fecal Occult Blood Test (FOBT)/Fecal Immunochemical Test (FIT)  * Fecal DNA/Cologuard Test  * Flexible Sigmoidoscopy Age: 54-65 years old   Colonoscopy: every 10 years (may be performed more frequently if at higher risk)  OR  FOBT/FIT: every 1 year  OR  Cologuard: every 3 years  OR  Sigmoidoscopy: every 5 years  Screening may be recommended earlier than age 48 if at higher risk for colorectal cancer  Also, an individualized decision between you and your healthcare provider will decide whether screening between the ages of 74-80 would be appropriate  Colonoscopy: Not on file  FOBT/FIT: Not on file  Cologuard: Not on file  Sigmoidoscopy: Not on file         Breast Cancer Screening Age: 36 years old  Frequency: every 1-2 years  Not required if history of left and right mastectomy Mammogram: Not on file       Cervical Cancer Screening Between the ages of 21-29, pap smear recommended once every 3 years  Between the ages of 33-67, can perform pap smear with HPV co-testing every 5 years     Recommendations may differ for women with a history of total hysterectomy, cervical cancer, or abnormal pap smears in past  Pap Smear: Not on file    Screening Not Indicated   Hepatitis C Screening Once for adults born between [facility]  More frequently in patients at high risk for Hepatitis C Hep C Antibody: Not on file       Diabetes Screening 1-2 times per year if you're at risk for diabetes or have pre-diabetes Fasting glucose: 112 mg/dL   A1C: No results in last 5 years       Cholesterol Screening Once every 5 years if you don't have a lipid disorder  May order more often based on risk factors  Lipid panel: 10/13/2018    Screening Current     Other Preventive Screenings Covered by Medicare:  1  Abdominal Aortic Aneurysm (AAA) Screening: covered once if your at risk  You're considered to be at risk if you have a family history of AAA  2  Lung Cancer Screening: covers low dose CT scan once per year if you meet all of the following conditions: (1) Age 50-69; (2) No signs or symptoms of lung cancer; (3) Current smoker or have quit smoking within the last 15 years; (4) You have a tobacco smoking history of at least 30 pack years (packs per day multiplied by number of years you smoked); (5) You get a written order from a healthcare provider  3  Glaucoma Screening: covered annually if you're considered high risk: (1) You have diabetes OR (2) Family history of glaucoma OR (3)  aged 48 and older OR (3)  American aged 72 and older  3  Osteoporosis Screening: covered every 2 years if you meet one of the following conditions: (1) You're estrogen deficient and at risk for osteoporosis based off medical history and other findings; (2) Have a vertebral abnormality; (3) On glucocorticoid therapy for more than 3 months; (4) Have primary hyperparathyroidism; (5) On osteoporosis medications and need to assess response to drug therapy  · Last bone density test (DXA Scan): Not on file  5  HIV Screening: covered annually if you're between the age of 12-76  Also covered annually if you are younger than 13 and older than 72 with risk factors for HIV infection  For pregnant patients, it is covered up to 3 times per pregnancy      Immunizations:  Immunization Recommendations   Influenza Vaccine Annual influenza vaccination during flu season is recommended for all persons aged >= 6 months who do not have contraindications   Pneumococcal Vaccine (Prevnar and Pneumovax)  * Prevnar = PCV13  * Pneumovax = PPSV23   Adults 25-60 years old: 1-3 doses may be recommended based on certain risk factors  Adults 72 years old: Prevnar (PCV13) vaccine recommended followed by Pneumovax (PPSV23) vaccine  If already received PPSV23 since turning 65, then PCV13 recommended at least one year after PPSV23 dose  Hepatitis B Vaccine 3 dose series if at intermediate or high risk (ex: diabetes, end stage renal disease, liver disease)   Tetanus (Td) Vaccine - COST NOT COVERED BY MEDICARE PART B Following completion of primary series, a booster dose should be given every 10 years to maintain immunity against tetanus  Td may also be given as tetanus wound prophylaxis  Tdap Vaccine - COST NOT COVERED BY MEDICARE PART B Recommended at least once for all adults  For pregnant patients, recommended with each pregnancy  Shingles Vaccine (Shingrix) - COST NOT COVERED BY MEDICARE PART B  2 shot series recommended in those aged 48 and above     Health Maintenance Due:      Topic Date Due    Hepatitis C Screening  1955    MAMMOGRAM  1955     Immunizations Due:      Topic Date Due    Pneumococcal Vaccine: 65+ Years (1 of 1 - PPSV23) 05/31/2020    Influenza Vaccine  07/01/2020     Advance Directives   What are advance directives? Advance directives are legal documents that state your wishes and plans for medical care  These plans are made ahead of time in case you lose your ability to make decisions for yourself  Advance directives can apply to any medical decision, such as the treatments you want, and if you want to donate organs  What are the types of advance directives? There are many types of advance directives, and each state has rules about how to use them   You may choose a combination of any of the following:  · Living will: This is a written record of the treatment you want  You can also choose which treatments you do not want, which to limit, and which to stop at a certain time  This includes surgery, medicine, IV fluid, and tube feedings  · Durable power of  for healthcare [facility]): This is a written record that states who you want to make healthcare choices for you when you are unable to make them for yourself  This person, called a proxy, is usually a family member or a friend  You may choose more than 1 proxy  · Do not resuscitate (DNR) order:  A DNR order is used in case your heart stops beating or you stop breathing  It is a request not to have certain forms of treatment, such as CPR  A DNR order may be included in other types of advance directives  · Medical directive: This covers the care that you want if you are in a coma, near death, or unable to make decisions for yourself  You can list the treatments you want for each condition  Treatment may include pain medicine, surgery, blood transfusions, dialysis, IV or tube feedings, and a ventilator (breathing machine)  · Values history: This document has questions about your views, beliefs, and how you feel and think about life  This information can help others choose the care that you would choose  Why are advance directives important? An advance directive helps you control your care  Although spoken wishes may be used, it is better to have your wishes written down  Spoken wishes can be misunderstood, or not followed  Treatments may be given even if you do not want them  An advance directive may make it easier for your family to make difficult choices about your care  Weight Management   Why it is important to manage your weight:  Being overweight increases your risk of health conditions such as heart disease, high blood pressure, type 2 diabetes, and certain types of cancer   It can also increase your risk for osteoarthritis, sleep apnea, and other respiratory problems  Aim for a slow, steady weight loss  Even a small amount of weight loss can lower your risk of health problems  How to lose weight safely:  A safe and healthy way to lose weight is to eat fewer calories and get regular exercise  You can lose up about 1 pound a week by decreasing the number of calories you eat by 500 calories each day  Healthy meal plan for weight management:  A healthy meal plan includes a variety of foods, contains fewer calories, and helps you stay healthy  A healthy meal plan includes the following:  · Eat whole-grain foods more often  A healthy meal plan should contain fiber  Fiber is the part of grains, fruits, and vegetables that is not broken down by your body  Whole-grain foods are healthy and provide extra fiber in your diet  Some examples of whole-grain foods are whole-wheat breads and pastas, oatmeal, brown rice, and bulgur  · Eat a variety of vegetables every day  Include dark, leafy greens such as spinach, kale, anastasia greens, and mustard greens  Eat yellow and orange vegetables such as carrots, sweet potatoes, and winter squash  · Eat a variety of fruits every day  Choose fresh or canned fruit (canned in its own juice or light syrup) instead of juice  Fruit juice has very little or no fiber  · Eat low-fat dairy foods  Drink fat-free (skim) milk or 1% milk  Eat fat-free yogurt and low-fat cottage cheese  Try low-fat cheeses such as mozzarella and other reduced-fat cheeses  · Choose meat and other protein foods that are low in fat  Choose beans or other legumes such as split peas or lentils  Choose fish, skinless poultry (chicken or turkey), or lean cuts of red meat (beef or pork)  Before you cook meat or poultry, cut off any visible fat  · Use less fat and oil  Try baking foods instead of frying them  Add less fat, such as margarine, sour cream, regular salad dressing and mayonnaise to foods  Eat fewer high-fat foods   Some examples of high-fat foods include french fries, doughnuts, ice cream, and cakes  · Eat fewer sweets  Limit foods and drinks that are high in sugar  This includes candy, cookies, regular soda, and sweetened drinks  Exercise:  Exercise at least 30 minutes per day on most days of the week  Some examples of exercise include walking, biking, dancing, and swimming  You can also fit in more physical activity by taking the stairs instead of the elevator or parking farther away from stores  Ask your healthcare provider about the best exercise plan for you  © Copyright [employer] 2018 Information is for End User's use only and may not be sold, redistributed or otherwise used for commercial purposes   All illustrations and images included in CareNotes® are the copyrighted property of A D A M [employer]  or [address] [place], [place] [address]peggy shanon

## 2020-08-23 DIAGNOSIS — I10 ESSENTIAL HYPERTENSION: ICD-10-CM

## 2020-08-24 RX ORDER — LISINOPRIL AND HYDROCHLOROTHIAZIDE 25; 20 MG/1; MG/1
TABLET ORAL
Qty: 30 TABLET | Refills: 0 | Status: SHIPPED | OUTPATIENT
Start: 2020-08-24 | End: 2020-09-21

## 2020-08-24 RX ORDER — METOPROLOL TARTRATE 50 MG/1
TABLET, FILM COATED ORAL
Qty: 180 TABLET | Refills: 1 | Status: SHIPPED | OUTPATIENT
Start: 2020-08-24 | End: 2020-11-20 | Stop reason: SDUPTHER

## 2020-09-21 DIAGNOSIS — I10 ESSENTIAL HYPERTENSION: ICD-10-CM

## 2020-09-21 RX ORDER — LISINOPRIL AND HYDROCHLOROTHIAZIDE 25; 20 MG/1; MG/1
TABLET ORAL
Qty: 30 TABLET | Refills: 0 | Status: SHIPPED | OUTPATIENT
Start: 2020-09-21 | End: 2020-10-19

## 2020-10-05 ENCOUNTER — CONSULT (OUTPATIENT)
Dept: CARDIOLOGY CLINIC | Facility: CLINIC | Age: 65
End: 2020-10-05
Payer: COMMERCIAL

## 2020-10-05 VITALS
HEART RATE: 101 BPM | SYSTOLIC BLOOD PRESSURE: 160 MMHG | DIASTOLIC BLOOD PRESSURE: 100 MMHG | HEIGHT: 69 IN | WEIGHT: 293 LBS | BODY MASS INDEX: 43.4 KG/M2

## 2020-10-05 DIAGNOSIS — E66.01 MORBID OBESITY (HCC): Primary | ICD-10-CM

## 2020-10-05 DIAGNOSIS — I48.91 ATRIAL FIBRILLATION, UNSPECIFIED TYPE (HCC): ICD-10-CM

## 2020-10-05 DIAGNOSIS — I10 ESSENTIAL HYPERTENSION: ICD-10-CM

## 2020-10-05 PROCEDURE — 3077F SYST BP >= 140 MM HG: CPT | Performed by: INTERNAL MEDICINE

## 2020-10-05 PROCEDURE — 93000 ELECTROCARDIOGRAM COMPLETE: CPT | Performed by: INTERNAL MEDICINE

## 2020-10-05 PROCEDURE — 99204 OFFICE O/P NEW MOD 45 MIN: CPT | Performed by: INTERNAL MEDICINE

## 2020-10-05 PROCEDURE — 3080F DIAST BP >= 90 MM HG: CPT | Performed by: INTERNAL MEDICINE

## 2020-10-05 PROCEDURE — 1036F TOBACCO NON-USER: CPT | Performed by: INTERNAL MEDICINE

## 2020-10-18 DIAGNOSIS — I10 ESSENTIAL HYPERTENSION: ICD-10-CM

## 2020-10-19 RX ORDER — LISINOPRIL AND HYDROCHLOROTHIAZIDE 25; 20 MG/1; MG/1
TABLET ORAL
Qty: 30 TABLET | Refills: 0 | Status: SHIPPED | OUTPATIENT
Start: 2020-10-19 | End: 2020-11-16

## 2020-10-31 LAB
CREAT ?TM UR-SCNC: 113 UMOL/L
EXT MICROALBUMIN URINE RANDOM: 2.2
HBA1C MFR BLD HPLC: 6 %
HCV AB SER-ACNC: NEGATIVE
MICROALBUMIN/CREAT UR: 19.5 MG/G{CREAT}

## 2020-11-06 ENCOUNTER — OFFICE VISIT (OUTPATIENT)
Dept: NEPHROLOGY | Facility: CLINIC | Age: 65
End: 2020-11-06
Payer: COMMERCIAL

## 2020-11-06 VITALS
HEART RATE: 79 BPM | SYSTOLIC BLOOD PRESSURE: 138 MMHG | BODY MASS INDEX: 43.1 KG/M2 | WEIGHT: 291 LBS | OXYGEN SATURATION: 99 % | TEMPERATURE: 99.1 F | HEIGHT: 69 IN | DIASTOLIC BLOOD PRESSURE: 86 MMHG

## 2020-11-06 DIAGNOSIS — I48.91 ATRIAL FIBRILLATION, UNSPECIFIED TYPE (HCC): ICD-10-CM

## 2020-11-06 DIAGNOSIS — Q61.02 MULTIPLE RENAL CYSTS: ICD-10-CM

## 2020-11-06 DIAGNOSIS — N18.32 STAGE 3B CHRONIC KIDNEY DISEASE (HCC): Primary | ICD-10-CM

## 2020-11-06 DIAGNOSIS — E78.00 PURE HYPERCHOLESTEROLEMIA: ICD-10-CM

## 2020-11-06 DIAGNOSIS — E11.9 TYPE 2 DIABETES MELLITUS WITHOUT COMPLICATION, WITHOUT LONG-TERM CURRENT USE OF INSULIN (HCC): ICD-10-CM

## 2020-11-06 DIAGNOSIS — I10 ESSENTIAL HYPERTENSION: ICD-10-CM

## 2020-11-06 PROCEDURE — 3066F NEPHROPATHY DOC TX: CPT | Performed by: FAMILY MEDICINE

## 2020-11-06 PROCEDURE — 3079F DIAST BP 80-89 MM HG: CPT | Performed by: INTERNAL MEDICINE

## 2020-11-06 PROCEDURE — 99204 OFFICE O/P NEW MOD 45 MIN: CPT | Performed by: INTERNAL MEDICINE

## 2020-11-06 PROCEDURE — 3075F SYST BP GE 130 - 139MM HG: CPT | Performed by: INTERNAL MEDICINE

## 2020-11-15 DIAGNOSIS — I10 ESSENTIAL HYPERTENSION: ICD-10-CM

## 2020-11-16 RX ORDER — LISINOPRIL AND HYDROCHLOROTHIAZIDE 25; 20 MG/1; MG/1
TABLET ORAL
Qty: 30 TABLET | Refills: 0 | Status: SHIPPED | OUTPATIENT
Start: 2020-11-16 | End: 2020-11-20 | Stop reason: SDUPTHER

## 2020-11-20 ENCOUNTER — OFFICE VISIT (OUTPATIENT)
Dept: FAMILY MEDICINE CLINIC | Facility: CLINIC | Age: 65
End: 2020-11-20
Payer: COMMERCIAL

## 2020-11-20 VITALS
DIASTOLIC BLOOD PRESSURE: 78 MMHG | HEIGHT: 68 IN | OXYGEN SATURATION: 99 % | HEART RATE: 82 BPM | TEMPERATURE: 99.3 F | SYSTOLIC BLOOD PRESSURE: 114 MMHG | WEIGHT: 279 LBS | BODY MASS INDEX: 42.28 KG/M2

## 2020-11-20 DIAGNOSIS — E11.9 ENCOUNTER FOR DIABETIC FOOT EXAM (HCC): ICD-10-CM

## 2020-11-20 DIAGNOSIS — I10 ESSENTIAL HYPERTENSION: ICD-10-CM

## 2020-11-20 DIAGNOSIS — E11.9 TYPE 2 DIABETES MELLITUS WITHOUT COMPLICATION, WITHOUT LONG-TERM CURRENT USE OF INSULIN (HCC): ICD-10-CM

## 2020-11-20 DIAGNOSIS — Z23 NEED FOR INFLUENZA VACCINATION: ICD-10-CM

## 2020-11-20 DIAGNOSIS — R42 VERTIGO: ICD-10-CM

## 2020-11-20 DIAGNOSIS — I10 ESSENTIAL HYPERTENSION: Primary | ICD-10-CM

## 2020-11-20 PROCEDURE — G0008 ADMIN INFLUENZA VIRUS VAC: HCPCS

## 2020-11-20 PROCEDURE — 3008F BODY MASS INDEX DOCD: CPT | Performed by: FAMILY MEDICINE

## 2020-11-20 PROCEDURE — 90662 IIV NO PRSV INCREASED AG IM: CPT

## 2020-11-20 PROCEDURE — 1036F TOBACCO NON-USER: CPT | Performed by: FAMILY MEDICINE

## 2020-11-20 PROCEDURE — 99214 OFFICE O/P EST MOD 30 MIN: CPT | Performed by: FAMILY MEDICINE

## 2020-11-20 RX ORDER — METOPROLOL TARTRATE 50 MG/1
50 TABLET, FILM COATED ORAL 2 TIMES DAILY
Qty: 180 TABLET | Refills: 1 | Status: SHIPPED | OUTPATIENT
Start: 2020-11-20 | End: 2021-05-18

## 2020-11-20 RX ORDER — LISINOPRIL AND HYDROCHLOROTHIAZIDE 25; 20 MG/1; MG/1
1 TABLET ORAL EVERY MORNING
Qty: 90 TABLET | Refills: 1 | Status: SHIPPED | OUTPATIENT
Start: 2020-11-20 | End: 2021-06-07

## 2020-12-22 DIAGNOSIS — I10 ESSENTIAL HYPERTENSION: ICD-10-CM

## 2020-12-23 RX ORDER — AMLODIPINE BESYLATE 5 MG/1
TABLET ORAL
Qty: 180 TABLET | Refills: 0 | Status: SHIPPED | OUTPATIENT
Start: 2020-12-23 | End: 2021-03-27

## 2021-03-18 ENCOUNTER — RA CDI HCC (OUTPATIENT)
Dept: OTHER | Facility: HOSPITAL | Age: 66
End: 2021-03-18

## 2021-03-18 NOTE — PROGRESS NOTES
Based on clinical documentation indicated in your record, it appears that the patient may have the following conditions:    E11 22 Type 2 diabetes with chronic kidney disease    E66 01 Morbid obesity     If this is correct, please document and assess at your next visit March 25th  [facility] 75  coding oppertunities             Chart reviewed, (number of) suggestions sent to provider: 2                 No response

## 2021-03-25 ENCOUNTER — OFFICE VISIT (OUTPATIENT)
Dept: FAMILY MEDICINE CLINIC | Facility: CLINIC | Age: 66
End: 2021-03-25
Payer: COMMERCIAL

## 2021-03-25 VITALS
OXYGEN SATURATION: 100 % | BODY MASS INDEX: 43.04 KG/M2 | DIASTOLIC BLOOD PRESSURE: 80 MMHG | HEIGHT: 68 IN | SYSTOLIC BLOOD PRESSURE: 128 MMHG | HEART RATE: 75 BPM | WEIGHT: 284 LBS | TEMPERATURE: 98.4 F | RESPIRATION RATE: 18 BRPM

## 2021-03-25 DIAGNOSIS — E11.22 TYPE 2 DIABETES MELLITUS WITH STAGE 3 CHRONIC KIDNEY DISEASE, WITHOUT LONG-TERM CURRENT USE OF INSULIN, UNSPECIFIED WHETHER STAGE 3A OR 3B CKD (HCC): ICD-10-CM

## 2021-03-25 DIAGNOSIS — N18.30 TYPE 2 DIABETES MELLITUS WITH STAGE 3 CHRONIC KIDNEY DISEASE, WITHOUT LONG-TERM CURRENT USE OF INSULIN, UNSPECIFIED WHETHER STAGE 3A OR 3B CKD (HCC): ICD-10-CM

## 2021-03-25 DIAGNOSIS — I48.91 ATRIAL FIBRILLATION, UNSPECIFIED TYPE (HCC): ICD-10-CM

## 2021-03-25 DIAGNOSIS — E66.9 DIABETES MELLITUS TYPE 2 IN OBESE (HCC): Primary | ICD-10-CM

## 2021-03-25 DIAGNOSIS — E11.69 DIABETES MELLITUS TYPE 2 IN OBESE (HCC): Primary | ICD-10-CM

## 2021-03-25 DIAGNOSIS — I10 ESSENTIAL HYPERTENSION: ICD-10-CM

## 2021-03-25 DIAGNOSIS — L98.9 SKIN LESIONS: ICD-10-CM

## 2021-03-25 DIAGNOSIS — N18.32 STAGE 3B CHRONIC KIDNEY DISEASE (HCC): ICD-10-CM

## 2021-03-25 LAB — SL AMB POCT HEMOGLOBIN AIC: 5.2 (ref ?–6.5)

## 2021-03-25 PROCEDURE — 3079F DIAST BP 80-89 MM HG: CPT | Performed by: FAMILY MEDICINE

## 2021-03-25 PROCEDURE — 83036 HEMOGLOBIN GLYCOSYLATED A1C: CPT | Performed by: FAMILY MEDICINE

## 2021-03-25 PROCEDURE — 1160F RVW MEDS BY RX/DR IN RCRD: CPT | Performed by: FAMILY MEDICINE

## 2021-03-25 PROCEDURE — 99214 OFFICE O/P EST MOD 30 MIN: CPT | Performed by: FAMILY MEDICINE

## 2021-03-25 PROCEDURE — 3066F NEPHROPATHY DOC TX: CPT | Performed by: FAMILY MEDICINE

## 2021-03-25 PROCEDURE — 3044F HG A1C LEVEL LT 7.0%: CPT | Performed by: FAMILY MEDICINE

## 2021-03-25 PROCEDURE — 3074F SYST BP LT 130 MM HG: CPT | Performed by: FAMILY MEDICINE

## 2021-03-25 PROCEDURE — 1036F TOBACCO NON-USER: CPT | Performed by: FAMILY MEDICINE

## 2021-03-25 NOTE — PROGRESS NOTES
Assessment/Plan:       Diagnoses and all orders for this visit:    Diabetes mellitus type 2 in obese [facility])  Comments:  ideally controlled, cpm  Orders:  -     POCT hemoglobin A1c  -     Ambulatory referral to Ophthalmology; Future  -     Ambulatory referral to Dermatology; Future    Stage 3b chronic kidney disease  Comments:  used to see nephrologist, but not since 2018, currently stable, cpm    Type 2 diabetes mellitus with stage 3 chronic kidney disease, without long-term current use of insulin, unspecified whether stage 3a or 3b CKD ([facility] 75 )    Essential hypertension  Comments:  well controlled, cpm    Atrial fibrillation, unspecified type ([facility] 75 )  Comments:  rate controlled; cardiol recommended full anticoagulation, but pt would not agree to treatment, also pt refused echo due to concern of her out of pocket expense    Body mass index (BMI)40 0-44 9, adult ([facility] 75 )    Skin lesions  -     Ambulatory referral to Dermatology; Future    Other orders  -     CINNAMON PO; Take 1 capsule by mouth daily          Subjective:   Chief Complaint   Patient presents with    Follow-up     No questions or concerns - will be scheuling mammo and dexa, has FIT testing to be completed , will be finding an eye doctor to schedule diabetic eye exam         Patient ID: Emerson Mondragon is a 72 y o  female      Routine f/u  "Just ok- knees and shoulder get stiff on me"  Has not had an eye exam for "many, many years"  "Trying to watch my salt, hardly use it now"  Has lost weight since early November- was 291 then  States she started taking cinnamon tabs for the DM  C/o Skin lesions "for years, I think ever since I had the Lyme disease" pt not noticing any more of them or enlarging sizes, but to me look like more are present now and enlarging compared to previous exam; never saw derm      The following portions of the patient's history were reviewed and updated as appropriate: allergies, current medications, past family history, past medical history, past social history, past surgical history and problem list     Review of Systems   All other systems reviewed and are negative  Objective:      /80 (BP Location: Left arm, Patient Position: Sitting)   Pulse 75   Temp 98 4 °F (36 9 °C)   Resp 18   Ht 5' 8" (1 727 m)   Wt 129 kg (284 lb)   SpO2 100%   BMI 43 18 kg/m²          Physical Exam  Vitals signs and nursing note reviewed  Constitutional:       General: She is not in acute distress  Appearance: She is well-developed  She is not ill-appearing, toxic-appearing or diaphoretic  HENT:      Head: Normocephalic and atraumatic  Eyes:      General: Lids are normal       Conjunctiva/sclera: Conjunctivae normal       Pupils: Pupils are equal, round, and reactive to light  Neck:      Musculoskeletal: Neck supple  Thyroid: No thyroid mass or thyromegaly  Vascular: No JVD  Trachea: Trachea normal    Cardiovascular:      Rate and Rhythm: Normal rate and regular rhythm  Heart sounds: Normal heart sounds  Comments: No edema  Pulmonary:      Effort: Pulmonary effort is normal       Breath sounds: Normal breath sounds  Abdominal:      General: Bowel sounds are normal  There is no distension  Palpations: Abdomen is soft  There is no hepatomegaly, splenomegaly or mass  Tenderness: There is no abdominal tenderness  Lymphadenopathy:      Cervical: No cervical adenopathy  Upper Body:      Right upper body: No supraclavicular adenopathy  Left upper body: No supraclavicular adenopathy  Skin:     General: Skin is warm and dry  Coloration: Skin is not pale  Comments: Numerous oval faintly red macules with central clearing and varying sizes up to very large scattered upper and lower extremities and trunk   Neurological:      Mental Status: She is alert and oriented to person, place, and time        Gait: Gait normal    Psychiatric:         Mood and Affect: Mood normal          Behavior: Behavior normal  Behavior is cooperative

## 2021-03-26 DIAGNOSIS — I10 ESSENTIAL HYPERTENSION: ICD-10-CM

## 2021-03-27 RX ORDER — AMLODIPINE BESYLATE 5 MG/1
5 TABLET ORAL DAILY
Qty: 90 TABLET | Refills: 1 | Status: SHIPPED | OUTPATIENT
Start: 2021-03-27 | End: 2021-11-29 | Stop reason: SDUPTHER

## 2021-04-16 ENCOUNTER — VBI (OUTPATIENT)
Dept: ADMINISTRATIVE | Facility: OTHER | Age: 66
End: 2021-04-16

## 2021-05-17 DIAGNOSIS — I10 ESSENTIAL HYPERTENSION: ICD-10-CM

## 2021-05-18 RX ORDER — METOPROLOL TARTRATE 50 MG/1
TABLET, FILM COATED ORAL
Qty: 180 TABLET | Refills: 1 | Status: SHIPPED | OUTPATIENT
Start: 2021-05-18 | End: 2021-11-12

## 2021-06-06 DIAGNOSIS — I10 ESSENTIAL HYPERTENSION: ICD-10-CM

## 2021-06-07 RX ORDER — LISINOPRIL AND HYDROCHLOROTHIAZIDE 25; 20 MG/1; MG/1
TABLET ORAL
Qty: 90 TABLET | Refills: 1 | Status: SHIPPED | OUTPATIENT
Start: 2021-06-07 | End: 2021-09-15

## 2021-09-02 ENCOUNTER — OFFICE VISIT (OUTPATIENT)
Dept: FAMILY MEDICINE CLINIC | Facility: CLINIC | Age: 66
End: 2021-09-02
Payer: COMMERCIAL

## 2021-09-02 VITALS
HEIGHT: 69 IN | WEIGHT: 288.6 LBS | BODY MASS INDEX: 42.75 KG/M2 | HEART RATE: 81 BPM | SYSTOLIC BLOOD PRESSURE: 132 MMHG | OXYGEN SATURATION: 99 % | DIASTOLIC BLOOD PRESSURE: 90 MMHG | TEMPERATURE: 99.1 F

## 2021-09-02 DIAGNOSIS — Z00.00 MEDICARE ANNUAL WELLNESS VISIT, SUBSEQUENT: Primary | ICD-10-CM

## 2021-09-02 DIAGNOSIS — E66.9 DIABETES MELLITUS TYPE 2 IN OBESE (HCC): ICD-10-CM

## 2021-09-02 DIAGNOSIS — Z12.12 SCREENING FOR COLORECTAL CANCER: ICD-10-CM

## 2021-09-02 DIAGNOSIS — E11.22 CONTROLLED TYPE 2 DIABETES MELLITUS WITH STAGE 3 CHRONIC KIDNEY DISEASE, WITHOUT LONG-TERM CURRENT USE OF INSULIN (HCC): ICD-10-CM

## 2021-09-02 DIAGNOSIS — Z78.0 POSTMENOPAUSAL: ICD-10-CM

## 2021-09-02 DIAGNOSIS — N18.30 CONTROLLED TYPE 2 DIABETES MELLITUS WITH STAGE 3 CHRONIC KIDNEY DISEASE, WITHOUT LONG-TERM CURRENT USE OF INSULIN (HCC): ICD-10-CM

## 2021-09-02 DIAGNOSIS — Z59.89 HAS HEALTH INSURANCE WITH INADEQUATE COVERAGE OF HEALTH EXPENSES: ICD-10-CM

## 2021-09-02 DIAGNOSIS — Z23 ENCOUNTER FOR IMMUNIZATION: ICD-10-CM

## 2021-09-02 DIAGNOSIS — E66.01 MORBID OBESITY (HCC): ICD-10-CM

## 2021-09-02 DIAGNOSIS — E11.69 DIABETES MELLITUS TYPE 2 IN OBESE (HCC): ICD-10-CM

## 2021-09-02 DIAGNOSIS — Z12.11 SCREENING FOR COLORECTAL CANCER: ICD-10-CM

## 2021-09-02 DIAGNOSIS — Z12.31 BREAST CANCER SCREENING BY MAMMOGRAM: ICD-10-CM

## 2021-09-02 DIAGNOSIS — I10 ESSENTIAL HYPERTENSION: ICD-10-CM

## 2021-09-02 LAB — SL AMB POCT HEMOGLOBIN AIC: 5.4 (ref ?–6.5)

## 2021-09-02 PROCEDURE — 90732 PPSV23 VACC 2 YRS+ SUBQ/IM: CPT

## 2021-09-02 PROCEDURE — 83036 HEMOGLOBIN GLYCOSYLATED A1C: CPT | Performed by: FAMILY MEDICINE

## 2021-09-02 PROCEDURE — G0009 ADMIN PNEUMOCOCCAL VACCINE: HCPCS

## 2021-09-02 PROCEDURE — G0439 PPPS, SUBSEQ VISIT: HCPCS | Performed by: FAMILY MEDICINE

## 2021-09-02 PROCEDURE — 99214 OFFICE O/P EST MOD 30 MIN: CPT | Performed by: FAMILY MEDICINE

## 2021-09-02 SDOH — ECONOMIC STABILITY - INCOME SECURITY: OTHER PROBLEMS RELATED TO HOUSING AND ECONOMIC CIRCUMSTANCES: Z59.89

## 2021-09-02 NOTE — PATIENT INSTRUCTIONS
Medicare Preventive Visit Patient Instructions  Thank you for completing your Welcome to Medicare Visit or Medicare Annual Wellness Visit today  Your next wellness visit will be due in one year (9/3/2022)  The screening/preventive services that you may require over the next 5-10 years are detailed below  Some tests may not apply to you based off risk factors and/or age  Screening tests ordered at today's visit but not completed yet may show as past due  Also, please note that scanned in results may not display below  Preventive Screenings:  Service Recommendations Previous Testing/Comments   Colorectal Cancer Screening  * Colonoscopy    * Fecal Occult Blood Test (FOBT)/Fecal Immunochemical Test (FIT)  * Fecal DNA/Cologuard Test  * Flexible Sigmoidoscopy Age: 54-65 years old   Colonoscopy: every 10 years (may be performed more frequently if at higher risk)  OR  FOBT/FIT: every 1 year  OR  Cologuard: every 3 years  OR  Sigmoidoscopy: every 5 years  Screening may be recommended earlier than age 48 if at higher risk for colorectal cancer  Also, an individualized decision between you and your healthcare provider will decide whether screening between the ages of 74-80 would be appropriate  Colonoscopy: Not on file  FOBT/FIT: Not on file  Cologuard: Not on file  Sigmoidoscopy: Not on file          Breast Cancer Screening Age: 36 years old  Frequency: every 1-2 years  Not required if history of left and right mastectomy Mammogram: Not on file        Cervical Cancer Screening Between the ages of 21-29, pap smear recommended once every 3 years  Between the ages of 33-67, can perform pap smear with HPV co-testing every 5 years     Recommendations may differ for women with a history of total hysterectomy, cervical cancer, or abnormal pap smears in past  Pap Smear: Not on file    Screening Not Indicated   Hepatitis C Screening Once for adults born between [facility]  More frequently in patients at high risk for Hepatitis C Hep C Antibody: 10/31/2020    Screening Current   Diabetes Screening 1-2 times per year if you're at risk for diabetes or have pre-diabetes Fasting glucose: 112 mg/dL   A1C: 5 2    Screening Not Indicated  History Diabetes   Cholesterol Screening Once every 5 years if you don't have a lipid disorder  May order more often based on risk factors  Lipid panel: 10/31/2020    Screening Not Indicated  History Lipid Disorder     Other Preventive Screenings Covered by Medicare:  1  Abdominal Aortic Aneurysm (AAA) Screening: covered once if your at risk  You're considered to be at risk if you have a family history of AAA  2  Lung Cancer Screening: covers low dose CT scan once per year if you meet all of the following conditions: (1) Age 50-69; (2) No signs or symptoms of lung cancer; (3) Current smoker or have quit smoking within the last 15 years; (4) You have a tobacco smoking history of at least 30 pack years (packs per day multiplied by number of years you smoked); (5) You get a written order from a healthcare provider  3  Glaucoma Screening: covered annually if you're considered high risk: (1) You have diabetes OR (2) Family history of glaucoma OR (3)  aged 48 and older OR (3)  American aged 72 and older  3  Osteoporosis Screening: covered every 2 years if you meet one of the following conditions: (1) You're estrogen deficient and at risk for osteoporosis based off medical history and other findings; (2) Have a vertebral abnormality; (3) On glucocorticoid therapy for more than 3 months; (4) Have primary hyperparathyroidism; (5) On osteoporosis medications and need to assess response to drug therapy  · Last bone density test (DXA Scan): Not on file  5  HIV Screening: covered annually if you're between the age of 12-76  Also covered annually if you are younger than 13 and older than 72 with risk factors for HIV infection   For pregnant patients, it is covered up to 3 times per pregnancy  Immunizations:  Immunization Recommendations   Influenza Vaccine Annual influenza vaccination during flu season is recommended for all persons aged >= 6 months who do not have contraindications   Pneumococcal Vaccine (Prevnar and Pneumovax)  * Prevnar = PCV13  * Pneumovax = PPSV23   Adults 25-60 years old: 1-3 doses may be recommended based on certain risk factors  Adults 72 years old: Prevnar (PCV13) vaccine recommended followed by Pneumovax (PPSV23) vaccine  If already received PPSV23 since turning 65, then PCV13 recommended at least one year after PPSV23 dose  Hepatitis B Vaccine 3 dose series if at intermediate or high risk (ex: diabetes, end stage renal disease, liver disease)   Tetanus (Td) Vaccine - COST NOT COVERED BY MEDICARE PART B Following completion of primary series, a booster dose should be given every 10 years to maintain immunity against tetanus  Td may also be given as tetanus wound prophylaxis  Tdap Vaccine - COST NOT COVERED BY MEDICARE PART B Recommended at least once for all adults  For pregnant patients, recommended with each pregnancy  Shingles Vaccine (Shingrix) - COST NOT COVERED BY MEDICARE PART B  2 shot series recommended in those aged 48 and above     Health Maintenance Due:      Topic Date Due    Breast Cancer Screening: Mammogram  Never done    Colorectal Cancer Screening  Never done    Hepatitis C Screening  Completed     Immunizations Due:      Topic Date Due    COVID-19 Vaccine (1) Never done    Pneumococcal Vaccine: 65+ Years (1 of 2 - PPSV23) 10/09/2020    Influenza Vaccine (1) 09/01/2021     Advance Directives   What are advance directives? Advance directives are legal documents that state your wishes and plans for medical care  These plans are made ahead of time in case you lose your ability to make decisions for yourself  Advance directives can apply to any medical decision, such as the treatments you want, and if you want to donate organs     What are the types of advance directives? There are many types of advance directives, and each state has rules about how to use them  You may choose a combination of any of the following:  · Living will: This is a written record of the treatment you want  You can also choose which treatments you do not want, which to limit, and which to stop at a certain time  This includes surgery, medicine, IV fluid, and tube feedings  · Durable power of  for healthcare [facility] [place]): This is a written record that states who you want to make healthcare choices for you when you are unable to make them for yourself  This person, called a proxy, is usually a family member or a friend  You may choose more than 1 proxy  · Do not resuscitate (DNR) order:  A DNR order is used in case your heart stops beating or you stop breathing  It is a request not to have certain forms of treatment, such as CPR  A DNR order may be included in other types of advance directives  · Medical directive: This covers the care that you want if you are in a coma, near death, or unable to make decisions for yourself  You can list the treatments you want for each condition  Treatment may include pain medicine, surgery, blood transfusions, dialysis, IV or tube feedings, and a ventilator (breathing machine)  · Values history: This document has questions about your views, beliefs, and how you feel and think about life  This information can help others choose the care that you would choose  Why are advance directives important? An advance directive helps you control your care  Although spoken wishes may be used, it is better to have your wishes written down  Spoken wishes can be misunderstood, or not followed  Treatments may be given even if you do not want them  An advance directive may make it easier for your family to make difficult choices about your care  Urinary Incontinence   Urinary incontinence (UI)  is when you lose control of your bladder   UI develops because your bladder cannot store or empty urine properly  The 3 most common types of UI are stress incontinence, urge incontinence, or both  Medicines:   · May be given to help strengthen your bladder control  Report any side effects of medication to your healthcare provider  Do pelvic muscle exercises often:  Your pelvic muscles help you stop urinating  Squeeze these muscles tight for 5 seconds, then relax for 5 seconds  Gradually work up to squeezing for 10 seconds  Do 3 sets of 15 repetitions a day, or as directed  This will help strengthen your pelvic muscles and improve bladder control  Train your bladder:  Go to the bathroom at set times, such as every 2 hours, even if you do not feel the urge to go  You can also try to hold your urine when you feel the urge to go  For example, hold your urine for 5 minutes when you feel the urge to go  As that becomes easier, hold your urine for 10 minutes  Self-care:   · Keep a UI record  Write down how often you leak urine and how much you leak  Make a note of what you were doing when you leaked urine  · Drink liquids as directed  You may need to limit the amount of liquid you drink to help control your urine leakage  Do not drink any liquid right before you go to bed  Limit or do not have drinks that contain caffeine or alcohol  · Prevent constipation  Eat a variety of high-fiber foods  Good examples are high-fiber cereals, beans, vegetables, and whole-grain breads  Walking is the best way to trigger your intestines to have a bowel movement  · Exercise regularly and maintain a healthy weight  Weight loss and exercise will decrease pressure on your bladder and help you control your leakage  · Use a catheter as directed  to help empty your bladder  A catheter is a tiny, plastic tube that is put into your bladder to drain your urine  · Go to behavior therapy as directed    Behavior therapy may be used to help you learn to control your urge to urinate  Weight Management   Why it is important to manage your weight:  Being overweight increases your risk of health conditions such as heart disease, high blood pressure, type 2 diabetes, and certain types of cancer  It can also increase your risk for osteoarthritis, sleep apnea, and other respiratory problems  Aim for a slow, steady weight loss  Even a small amount of weight loss can lower your risk of health problems  How to lose weight safely:  A safe and healthy way to lose weight is to eat fewer calories and get regular exercise  You can lose up about 1 pound a week by decreasing the number of calories you eat by 500 calories each day  Healthy meal plan for weight management:  A healthy meal plan includes a variety of foods, contains fewer calories, and helps you stay healthy  A healthy meal plan includes the following:  · Eat whole-grain foods more often  A healthy meal plan should contain fiber  Fiber is the part of grains, fruits, and vegetables that is not broken down by your body  Whole-grain foods are healthy and provide extra fiber in your diet  Some examples of whole-grain foods are whole-wheat breads and pastas, oatmeal, brown rice, and bulgur  · Eat a variety of vegetables every day  Include dark, leafy greens such as spinach, kale, anastasia greens, and mustard greens  Eat yellow and orange vegetables such as carrots, sweet potatoes, and winter squash  · Eat a variety of fruits every day  Choose fresh or canned fruit (canned in its own juice or light syrup) instead of juice  Fruit juice has very little or no fiber  · Eat low-fat dairy foods  Drink fat-free (skim) milk or 1% milk  Eat fat-free yogurt and low-fat cottage cheese  Try low-fat cheeses such as mozzarella and other reduced-fat cheeses  · Choose meat and other protein foods that are low in fat  Choose beans or other legumes such as split peas or lentils   Choose fish, skinless poultry (chicken or turkey), or lean cuts of red meat (beef or pork)  Before you cook meat or poultry, cut off any visible fat  · Use less fat and oil  Try baking foods instead of frying them  Add less fat, such as margarine, sour cream, regular salad dressing and mayonnaise to foods  Eat fewer high-fat foods  Some examples of high-fat foods include french fries, doughnuts, ice cream, and cakes  · Eat fewer sweets  Limit foods and drinks that are high in sugar  This includes candy, cookies, regular soda, and sweetened drinks  Exercise:  Exercise at least 30 minutes per day on most days of the week  Some examples of exercise include walking, biking, dancing, and swimming  You can also fit in more physical activity by taking the stairs instead of the elevator or parking farther away from stores  Ask your healthcare provider about the best exercise plan for you  © Copyright [employer] 2018 Information is for End User's use only and may not be sold, redistributed or otherwise used for commercial purposes   All illustrations and images included in CareNotes® are the copyrighted property of [employer]  or [address] [place] [address]

## 2021-09-02 NOTE — PROGRESS NOTES
Assessment/Plan:         Diagnoses and all orders for this visit:    Medicare annual wellness visit, subsequent    Diabetes mellitus type 2 in obese ([facility] )    Controlled type 2 diabetes mellitus with stage 3 chronic kidney disease, without long-term current use of insulin ([facility] )  -     POCT hemoglobin A1c    Essential hypertension    Morbid obesity ([facility] )    Has health insurance with inadequate coverage of health expenses    Breast cancer screening by mammogram  -     Mammo screening bilateral w 3d & cad; Future    Screening for colorectal cancer  -     Occult Bloood,Fecal Immunochemical; Future    Encounter for immunization  -     PNEUMOCOCCAL POLYSACCHARIDE VACCINE 23-VALENT =>3YO SQ IM    Postmenopausal  -     DXA bone density spine hip and pelvis; Future    Other orders  -     Cancel: Ambulatory referral to Gastroenterology; Future  -     Cancel: Hemoglobin A1C ([facility]); Future          Subjective:   Chief Complaint   Patient presents with   [facility] Wellness Visit     Pt also having some rt foot pain        Patient ID: Rafael Jacques is a 77 y o  female      Medicare wellness and f/u visit   doing well   only c/o today is had been having right foot pain- has pretty much resolved since she retired in July, now just occasionally occurs; "I know I gotta lose weight"- will think about scheduling appt with nutritionist in this regard  Pt did receive both doses of [place] vaccine at [address] defers colonoscopy at this time due to her sister undergoing surgery and lives w/ pt- pt is caring for her  bp elevated today on rooming- pt states her bp was low one day last week and she held her bp med that day- fine since and she otherwise takes all her meds as rx'd      The following portions of the patient's history were reviewed and updated as appropriate: allergies, current medications, past family history, past medical history, past social history, past surgical history and problem list     Review of Systems   All other systems reviewed and are negative  Objective:      /90 (BP Location: Left arm, Patient Position: Sitting, Cuff Size: Large)   Pulse 81   Temp 99 1 °F (37 3 °C) (Tympanic)   Ht 5' 9" (1 753 m)   Wt 131 kg (288 lb 9 6 oz)   SpO2 99%   BMI 42 62 kg/m²          Physical Exam  Vitals and nursing note reviewed  Constitutional:       General: She is not in acute distress  Appearance: She is well-developed  She is not ill-appearing, toxic-appearing or diaphoretic  HENT:      Head: Normocephalic and atraumatic  Eyes:      General: Lids are normal       Conjunctiva/sclera: Conjunctivae normal       Pupils: Pupils are equal, round, and reactive to light  Neck:      Thyroid: No thyroid mass or thyromegaly  Vascular: No JVD  Trachea: Trachea normal    Cardiovascular:      Rate and Rhythm: Normal rate and regular rhythm  Pulses: no weak pulses          Dorsalis pedis pulses are 2+ on the right side and 2+ on the left side  Posterior tibial pulses are 2+ on the right side and 2+ on the left side  Heart sounds: Normal heart sounds  Comments: No edema  Pulmonary:      Effort: Pulmonary effort is normal       Breath sounds: Normal breath sounds  Abdominal:      General: Bowel sounds are normal  There is no distension  Palpations: Abdomen is soft  There is no hepatomegaly, splenomegaly or mass  Tenderness: There is no abdominal tenderness  Musculoskeletal:      Cervical back: Neck supple  Feet:      Right foot:      Skin integrity: No ulcer, skin breakdown, erythema, warmth, callus or dry skin  Left foot:      Skin integrity: No ulcer, skin breakdown, erythema, warmth, callus or dry skin  Lymphadenopathy:      Cervical: No cervical adenopathy  Upper Body:      Right upper body: No supraclavicular adenopathy  Left upper body: No supraclavicular adenopathy  Skin:     General: Skin is warm and dry        Coloration: Skin is not pale  Neurological:      Mental Status: She is alert and oriented to person, place, and time  Gait: Gait normal    Psychiatric:         Behavior: Behavior normal  Behavior is cooperative  Patient's shoes and socks removed  Right Foot/Ankle   Right Foot Inspection  Skin Exam: skin normal and skin intact no dry skin, no warmth, no callus, no erythema, no maceration, no abnormal color, no pre-ulcer, no ulcer and no callus                            Sensory       Monofilament testing: intact  Vascular    The right DP pulse is 2+  The right PT pulse is 2+  Left Foot/Ankle  Left Foot Inspection  Skin Exam: skin normal and skin intactno dry skin, no warmth, no erythema, no maceration, normal color, no pre-ulcer, no ulcer and no callus                                         Sensory       Monofilament: intact  Vascular    The left DP pulse is 2+  The left PT pulse is 2+  Assign Risk Category:  No deformity present; No loss of protective sensation;  No weak pulses       Risk: 0

## 2021-09-14 ENCOUNTER — APPOINTMENT (OUTPATIENT)
Dept: LAB | Facility: CLINIC | Age: 66
End: 2021-09-14
Payer: COMMERCIAL

## 2021-09-14 DIAGNOSIS — I10 ESSENTIAL HYPERTENSION: ICD-10-CM

## 2021-09-14 DIAGNOSIS — N18.32 STAGE 3B CHRONIC KIDNEY DISEASE (HCC): ICD-10-CM

## 2021-09-14 DIAGNOSIS — E78.00 PURE HYPERCHOLESTEROLEMIA: ICD-10-CM

## 2021-09-14 LAB
ALBUMIN SERPL BCP-MCNC: 3.1 G/DL (ref 3.5–5)
ALP SERPL-CCNC: 98 U/L (ref 46–116)
ALT SERPL W P-5'-P-CCNC: 24 U/L (ref 12–78)
ANION GAP SERPL CALCULATED.3IONS-SCNC: 3 MMOL/L (ref 4–13)
AST SERPL W P-5'-P-CCNC: 5 U/L (ref 5–45)
BASOPHILS # BLD AUTO: 0.04 THOUSANDS/ΜL (ref 0–0.1)
BASOPHILS NFR BLD AUTO: 1 % (ref 0–1)
BILIRUB SERPL-MCNC: 0.32 MG/DL (ref 0.2–1)
BUN SERPL-MCNC: 38 MG/DL (ref 5–25)
CALCIUM ALBUM COR SERPL-MCNC: 9.6 MG/DL (ref 8.3–10.1)
CALCIUM SERPL-MCNC: 8.9 MG/DL (ref 8.3–10.1)
CHLORIDE SERPL-SCNC: 108 MMOL/L (ref 100–108)
CO2 SERPL-SCNC: 27 MMOL/L (ref 21–32)
CREAT SERPL-MCNC: 1.33 MG/DL (ref 0.6–1.3)
CREAT UR-MCNC: 102 MG/DL
CREAT UR-MCNC: 102 MG/DL
EOSINOPHIL # BLD AUTO: 0.13 THOUSAND/ΜL (ref 0–0.61)
EOSINOPHIL NFR BLD AUTO: 2 % (ref 0–6)
ERYTHROCYTE [DISTWIDTH] IN BLOOD BY AUTOMATED COUNT: 14.5 % (ref 11.6–15.1)
GFR SERPL CREATININE-BSD FRML MDRD: 42 ML/MIN/1.73SQ M
GLUCOSE P FAST SERPL-MCNC: 98 MG/DL (ref 65–99)
HCT VFR BLD AUTO: 38.1 % (ref 34.8–46.1)
HGB BLD-MCNC: 11.8 G/DL (ref 11.5–15.4)
IMM GRANULOCYTES # BLD AUTO: 0.04 THOUSAND/UL (ref 0–0.2)
IMM GRANULOCYTES NFR BLD AUTO: 1 % (ref 0–2)
LDLC SERPL DIRECT ASSAY-MCNC: 93 MG/DL (ref 0–100)
LYMPHOCYTES # BLD AUTO: 1.84 THOUSANDS/ΜL (ref 0.6–4.47)
LYMPHOCYTES NFR BLD AUTO: 33 % (ref 14–44)
MCH RBC QN AUTO: 28.7 PG (ref 26.8–34.3)
MCHC RBC AUTO-ENTMCNC: 31 G/DL (ref 31.4–37.4)
MCV RBC AUTO: 93 FL (ref 82–98)
MICROALBUMIN UR-MCNC: 19.2 MG/L (ref 0–20)
MICROALBUMIN/CREAT 24H UR: 19 MG/G CREATININE (ref 0–30)
MONOCYTES # BLD AUTO: 0.5 THOUSAND/ΜL (ref 0.17–1.22)
MONOCYTES NFR BLD AUTO: 9 % (ref 4–12)
NEUTROPHILS # BLD AUTO: 3.03 THOUSANDS/ΜL (ref 1.85–7.62)
NEUTS SEG NFR BLD AUTO: 54 % (ref 43–75)
NRBC BLD AUTO-RTO: 0 /100 WBCS
PLATELET # BLD AUTO: 233 THOUSANDS/UL (ref 149–390)
PMV BLD AUTO: 10.8 FL (ref 8.9–12.7)
POTASSIUM SERPL-SCNC: 4.3 MMOL/L (ref 3.5–5.3)
PROT SERPL-MCNC: 7.3 G/DL (ref 6.4–8.2)
PROT UR-MCNC: 13 MG/DL
PROT/CREAT UR: 0.13 MG/G{CREAT} (ref 0–0.1)
RBC # BLD AUTO: 4.11 MILLION/UL (ref 3.81–5.12)
SODIUM SERPL-SCNC: 138 MMOL/L (ref 136–145)
URATE SERPL-MCNC: 7.6 MG/DL (ref 2–6.8)
WBC # BLD AUTO: 5.58 THOUSAND/UL (ref 4.31–10.16)

## 2021-09-14 PROCEDURE — 82570 ASSAY OF URINE CREATININE: CPT

## 2021-09-14 PROCEDURE — 85025 COMPLETE CBC W/AUTO DIFF WBC: CPT

## 2021-09-14 PROCEDURE — 36415 COLL VENOUS BLD VENIPUNCTURE: CPT

## 2021-09-14 PROCEDURE — 84550 ASSAY OF BLOOD/URIC ACID: CPT

## 2021-09-14 PROCEDURE — 3061F NEG MICROALBUMINURIA REV: CPT | Performed by: INTERNAL MEDICINE

## 2021-09-14 PROCEDURE — 82570 ASSAY OF URINE CREATININE: CPT | Performed by: FAMILY MEDICINE

## 2021-09-14 PROCEDURE — 82043 UR ALBUMIN QUANTITATIVE: CPT | Performed by: FAMILY MEDICINE

## 2021-09-14 PROCEDURE — 80053 COMPREHEN METABOLIC PANEL: CPT

## 2021-09-14 PROCEDURE — 83721 ASSAY OF BLOOD LIPOPROTEIN: CPT

## 2021-09-14 PROCEDURE — 84156 ASSAY OF PROTEIN URINE: CPT

## 2021-09-15 ENCOUNTER — OFFICE VISIT (OUTPATIENT)
Dept: NEPHROLOGY | Facility: CLINIC | Age: 66
End: 2021-09-15
Payer: COMMERCIAL

## 2021-09-15 VITALS
BODY MASS INDEX: 42.3 KG/M2 | HEIGHT: 69 IN | HEART RATE: 78 BPM | OXYGEN SATURATION: 98 % | DIASTOLIC BLOOD PRESSURE: 70 MMHG | SYSTOLIC BLOOD PRESSURE: 122 MMHG | WEIGHT: 285.6 LBS

## 2021-09-15 DIAGNOSIS — N18.32 STAGE 3B CHRONIC KIDNEY DISEASE (HCC): ICD-10-CM

## 2021-09-15 DIAGNOSIS — N18.30 CONTROLLED TYPE 2 DIABETES MELLITUS WITH STAGE 3 CHRONIC KIDNEY DISEASE, WITHOUT LONG-TERM CURRENT USE OF INSULIN (HCC): Primary | ICD-10-CM

## 2021-09-15 DIAGNOSIS — I10 ESSENTIAL HYPERTENSION: ICD-10-CM

## 2021-09-15 DIAGNOSIS — E11.22 CONTROLLED TYPE 2 DIABETES MELLITUS WITH STAGE 3 CHRONIC KIDNEY DISEASE, WITHOUT LONG-TERM CURRENT USE OF INSULIN (HCC): Primary | ICD-10-CM

## 2021-09-15 PROCEDURE — 1036F TOBACCO NON-USER: CPT | Performed by: INTERNAL MEDICINE

## 2021-09-15 PROCEDURE — 1160F RVW MEDS BY RX/DR IN RCRD: CPT | Performed by: INTERNAL MEDICINE

## 2021-09-15 PROCEDURE — 3078F DIAST BP <80 MM HG: CPT | Performed by: INTERNAL MEDICINE

## 2021-09-15 PROCEDURE — 3008F BODY MASS INDEX DOCD: CPT | Performed by: INTERNAL MEDICINE

## 2021-09-15 PROCEDURE — 3074F SYST BP LT 130 MM HG: CPT | Performed by: INTERNAL MEDICINE

## 2021-09-15 PROCEDURE — 3066F NEPHROPATHY DOC TX: CPT | Performed by: INTERNAL MEDICINE

## 2021-09-15 PROCEDURE — 99214 OFFICE O/P EST MOD 30 MIN: CPT | Performed by: INTERNAL MEDICINE

## 2021-09-15 RX ORDER — LISINOPRIL AND HYDROCHLOROTHIAZIDE 25; 20 MG/1; MG/1
0.5 TABLET ORAL EVERY MORNING
Qty: 90 TABLET | Refills: 1
Start: 2021-09-15 | End: 2022-04-27 | Stop reason: SDUPTHER

## 2021-09-15 NOTE — PATIENT INSTRUCTIONS
Try to lose some weight  Avoid salt    Avoid NSAIDs  Cut lisinopril HCT and half and take half a tablet in the morning   Have labs in 1 month to check your kidney function  Have a kidney and bladder ultrasound

## 2021-09-15 NOTE — ASSESSMENT & PLAN NOTE
Lab Results   Component Value Date    EGFR 42 09/14/2021    EGFR 48 10/13/2018    EGFR 55 11/11/2017    CREATININE 1 33 (H) 09/14/2021    CREATININE 1 20 10/13/2018    CREATININE 1 16 04/14/2018   Had worsening in her creatinine from 1 2-1 3 mg/dL   Will reduce lisinopril HCT to 10/12  5  From 20/25  Check labs in 1 month  She avoids NSAIDs   I have reviewed her medications for potential nephrotoxins  Encourage her to lose weight as this will help her kidney function   she had a normal kidney ultrasound in 2017  She is not spilling albumin in her urine  She has minimal proteinuria which is tubular proteinuria     Will continue to follow  Will repeat ultrasound

## 2021-09-15 NOTE — PROGRESS NOTES
[facility] [place], [place]    Name: Leslie Luke  YOB: 1955      Assessment/Plan:           Problem List Items Addressed This Visit        Endocrine    Controlled type 2 diabetes mellitus with stage 3 chronic kidney disease, without long-term current use of insulin ([facility] [address] ) - Primary       Cardiovascular and Mediastinum    Essential hypertension      Blood pressure is low  Please see note from above  Will reduce lisinopril HCT dose            Genitourinary    Stage 3 chronic kidney disease ([facility] [address] )     Lab Results   Component Value Date    EGFR 42 09/14/2021    EGFR 48 10/13/2018    EGFR 55 11/11/2017    CREATININE 1 33 (H) 09/14/2021    CREATININE 1 20 10/13/2018    CREATININE 1 16 04/14/2018   Had worsening in her creatinine from 1 2-1 3 mg/dL   Will reduce lisinopril HCT to 10/12  5  From 20/25  Check labs in 1 month  She avoids NSAIDs   I have reviewed her medications for potential nephrotoxins  Encourage her to lose weight as this will help her kidney function   she had a normal kidney ultrasound in 2017  She is not spilling albumin in her urine  She has minimal proteinuria which is tubular proteinuria  Will continue to follow  Will repeat ultrasound                 Subjective:      Patient ID: Leslie Luke is a 77 y o  female  HPI  Has a history of CKD 3 with a baseline creatinine of 1 1-2 mg/dl, atrial fibrillation, DM 2 and hypertension She has a history of hyponatremia  Has been feeling well and recently retired  The following portions of the patient's history were reviewed and updated as appropriate: allergies, current medications, past family history, past medical history, past social history, past surgical history and problem list     Review of Systems   Constitutional: Positive for fatigue  HENT: Negative for hearing loss  Eyes: Negative for visual disturbance  Respiratory: Negative for cough and shortness of breath  Cardiovascular: Negative for chest pain, palpitations and leg swelling  Rare palpitations   Gastrointestinal: Negative for abdominal pain, blood in stool and constipation  Genitourinary: Negative for difficulty urinating, dysuria and hematuria  Musculoskeletal: Positive for arthralgias  Neurological: Positive for light-headedness  Negative for dizziness, weakness and headaches  Hematological: Bruises/bleeds easily  Psychiatric/Behavioral: Negative for dysphoric mood  Social History     Socioeconomic History    Marital status: Single     Spouse name: None    Number of children: None    Years of education: None    Highest education level: None   Occupational History    None   Tobacco Use    Smoking status: Never Smoker    Smokeless tobacco: Never Used   Vaping Use    Vaping Use: Never used   Substance and Sexual Activity    Alcohol use: No    Drug use: No    Sexual activity: Not Currently   Other Topics Concern    None   Social History Narrative    None     Social Determinants of Health     Financial Resource Strain:     Difficulty of Paying Living Expenses:    Food Insecurity:     Worried About Running Out of Food in the Last Year:     Ran Out of Food in the Last Year:    Transportation Needs: No Transportation Needs    Lack of Transportation (Medical): No    Lack of Transportation (Non-Medical):  No   Physical Activity:     Days of Exercise per Week:     Minutes of Exercise per Session:    Stress:     Feeling of Stress :    Social Connections:     Frequency of Communication with Friends and Family:     Frequency of Social Gatherings with Friends and Family:     Attends Adventism Services:     Active Member of Clubs or Organizations:     Attends Club or Organization Meetings:     Marital Status:    Intimate Partner Violence:     Fear of Current or Ex-Partner:     Emotionally Abused:     Physically Abused:     Sexually Abused:      Past Medical History: Diagnosis Date    Hypokalemia     Last Assessed: 8/12/2016     Hyponatremia     Last Assessed: 8/12/2016     Irregular heartbeat     A Flutter 7/2015 while in hospital for pneumonia, resolved     Pneumonia      History reviewed  No pertinent surgical history      Current Outpatient Medications:     amLODIPine (NORVASC) 5 mg tablet, Take 1 tablet (5 mg total) by mouth daily, Disp: 90 tablet, Rfl: 1    aspirin 81 MG tablet, Take 1 tablet by mouth daily, Disp: , Rfl:     Cholecalciferol (VITAMIN D3) 2000 units capsule, Take 1 capsule by mouth daily, Disp: , Rfl:     CINNAMON PO, Take 1 capsule by mouth daily, Disp: , Rfl:     Coenzyme Q-10 200 MG CAPS, Take 1 capsule by mouth, Disp: , Rfl:     Cranberry 500 MG CAPS, Take 1 capsule by mouth daily, Disp: , Rfl:     lisinopril-hydrochlorothiazide (PRINZIDE,ZESTORETIC) 20-25 MG per tablet, TAKE 1 TABLET BY MOUTH EVERY DAY IN THE MORNING, Disp: 90 tablet, Rfl: 1    metoprolol tartrate (LOPRESSOR) 50 mg tablet, TAKE 1 TABLET BY MOUTH TWICE A DAY, Disp: 180 tablet, Rfl: 1    Multiple Vitamins-Minerals (DAILY MULTIPLE VITAMINS/MIN PO), Take 1 tablet by mouth daily, Disp: , Rfl:     Omega-3 Fatty Acids (FISH OIL) 1,000 mg, Take 1 capsule by mouth daily, Disp: , Rfl:     Protein (NUTRA/PRO CHOCOLATE PO), Take by mouth, Disp: , Rfl:     Lab Results   Component Value Date     04/14/2018    SODIUM 138 09/14/2021    K 4 3 09/14/2021     09/14/2021    CO2 27 09/14/2021    ANIONGAP 13 4 04/14/2018    AGAP 3 (L) 09/14/2021    BUN 38 (H) 09/14/2021    CREATININE 1 33 (H) 09/14/2021    GLUF 98 09/14/2021    CALCIUM 8 9 09/14/2021    AST 5 09/14/2021    ALT 24 09/14/2021    ALKPHOS 98 09/14/2021    PROT 7 2 04/14/2018    TP 7 3 09/14/2021    BILITOT 0 4 04/14/2018    TBILI 0 32 09/14/2021    EGFR 42 09/14/2021     Lab Results   Component Value Date    WBC 5 58 09/14/2021    HGB 11 8 09/14/2021    HCT 38 1 09/14/2021    MCV 93 09/14/2021     09/14/2021 Lab Results   Component Value Date    CHOLESTEROL 161 10/13/2018    CHOLESTEROL 182 04/19/2017     Lab Results   Component Value Date    HDL 51 10/13/2018    HDL 68 (H) 04/19/2017    HDL 47 07/27/2016     Lab Results   Component Value Date    LDLCALC 92 10/13/2018    LDLCALC 100 04/19/2017     Lab Results   Component Value Date    TRIG 88 10/13/2018    TRIG 68 04/19/2017    TRIG 104 07/27/2016     No results found for: [place], [place]  Lab Results   Component Value Date    KJH6WLLDZXYC 3 460 04/19/2017     Lab Results   Component Value Date    CALCIUM 8 9 09/14/2021     No results found for: SPEP, UPEP  No results found for: MICROALBUR, [id number]  PCR 0 13  LDL 93  MAC 19  A1c 5 4      Objective:      /70   Pulse 78   Ht 5' 9" (1 753 m)   Wt 130 kg (285 lb 9 6 oz)   SpO2 98%   BMI 42 18 kg/m²          Physical Exam  Constitutional:       General: She is not in acute distress  Appearance: She is obese  She is not toxic-appearing  HENT:      Head: Normocephalic and atraumatic  Right Ear: External ear normal       Left Ear: External ear normal    Eyes:      Extraocular Movements: Extraocular movements intact  Conjunctiva/sclera: Conjunctivae normal       Pupils: Pupils are equal, round, and reactive to light  Neck:      Vascular: No carotid bruit  Cardiovascular:      Rate and Rhythm: Normal rate and regular rhythm  Comments: Not in a  Fib today  Pulmonary:      Effort: Pulmonary effort is normal  No respiratory distress  Breath sounds: Normal breath sounds  No wheezing or rales  Abdominal:      General: Bowel sounds are normal  There is no distension  Palpations: Abdomen is soft  Tenderness: There is no abdominal tenderness  Musculoskeletal:         General: Normal range of motion  Cervical back: Normal range of motion and neck supple  Right lower leg: No edema  Left lower leg: No edema  Lymphadenopathy:      Cervical: No cervical adenopathy  Skin:     General: Skin is warm and dry  Neurological:      General: No focal deficit present  Mental Status: She is alert and oriented to person, place, and time  Mental status is at baseline  Psychiatric:         Mood and Affect: Mood normal          Behavior: Behavior normal          Thought Content:  Thought content normal          Judgment: Judgment normal

## 2021-09-18 ENCOUNTER — HOSPITAL ENCOUNTER (OUTPATIENT)
Dept: ULTRASOUND IMAGING | Facility: HOSPITAL | Age: 66
Discharge: HOME | End: 2021-09-18
Attending: INTERNAL MEDICINE
Payer: COMMERCIAL

## 2021-09-18 DIAGNOSIS — N18.32 STAGE 3B CHRONIC KIDNEY DISEASE (HCC): ICD-10-CM

## 2021-09-18 PROCEDURE — 76770 US EXAM ABDO BACK WALL COMP: CPT

## 2021-09-23 ENCOUNTER — VBI (OUTPATIENT)
Dept: ADMINISTRATIVE | Facility: OTHER | Age: 66
End: 2021-09-23

## 2021-11-12 DIAGNOSIS — I10 ESSENTIAL HYPERTENSION: ICD-10-CM

## 2021-11-12 RX ORDER — METOPROLOL TARTRATE 50 MG/1
TABLET, FILM COATED ORAL
Qty: 180 TABLET | Refills: 1 | Status: SHIPPED | OUTPATIENT
Start: 2021-11-12 | End: 2022-05-10

## 2021-11-29 DIAGNOSIS — I10 ESSENTIAL HYPERTENSION: ICD-10-CM

## 2021-11-29 RX ORDER — AMLODIPINE BESYLATE 5 MG/1
5 TABLET ORAL DAILY
Qty: 90 TABLET | Refills: 1 | Status: SHIPPED | OUTPATIENT
Start: 2021-11-29 | End: 2022-05-04

## 2022-01-03 ENCOUNTER — OFFICE VISIT (OUTPATIENT)
Dept: FAMILY MEDICINE CLINIC | Facility: CLINIC | Age: 67
End: 2022-01-03
Payer: COMMERCIAL

## 2022-01-03 VITALS
HEIGHT: 68 IN | HEART RATE: 80 BPM | DIASTOLIC BLOOD PRESSURE: 94 MMHG | OXYGEN SATURATION: 99 % | SYSTOLIC BLOOD PRESSURE: 150 MMHG | WEIGHT: 293 LBS | BODY MASS INDEX: 44.41 KG/M2 | TEMPERATURE: 98.7 F

## 2022-01-03 DIAGNOSIS — E11.22 CONTROLLED TYPE 2 DIABETES MELLITUS WITH STAGE 3 CHRONIC KIDNEY DISEASE, WITHOUT LONG-TERM CURRENT USE OF INSULIN (HCC): ICD-10-CM

## 2022-01-03 DIAGNOSIS — N18.30 CONTROLLED TYPE 2 DIABETES MELLITUS WITH STAGE 3 CHRONIC KIDNEY DISEASE, WITHOUT LONG-TERM CURRENT USE OF INSULIN (HCC): ICD-10-CM

## 2022-01-03 DIAGNOSIS — E66.01 MORBID OBESITY (HCC): ICD-10-CM

## 2022-01-03 DIAGNOSIS — I10 ESSENTIAL HYPERTENSION: Primary | ICD-10-CM

## 2022-01-03 DIAGNOSIS — I48.91 ATRIAL FIBRILLATION, UNSPECIFIED TYPE (HCC): ICD-10-CM

## 2022-01-03 PROCEDURE — 99214 OFFICE O/P EST MOD 30 MIN: CPT | Performed by: FAMILY MEDICINE

## 2022-01-03 PROCEDURE — 3008F BODY MASS INDEX DOCD: CPT | Performed by: FAMILY MEDICINE

## 2022-01-03 PROCEDURE — 3066F NEPHROPATHY DOC TX: CPT | Performed by: FAMILY MEDICINE

## 2022-01-03 PROCEDURE — 1036F TOBACCO NON-USER: CPT | Performed by: FAMILY MEDICINE

## 2022-01-03 PROCEDURE — 1160F RVW MEDS BY RX/DR IN RCRD: CPT | Performed by: FAMILY MEDICINE

## 2022-01-03 PROCEDURE — 3077F SYST BP >= 140 MM HG: CPT | Performed by: FAMILY MEDICINE

## 2022-01-03 PROCEDURE — 3080F DIAST BP >= 90 MM HG: CPT | Performed by: FAMILY MEDICINE

## 2022-01-03 NOTE — PROGRESS NOTES
Assessment/Plan:       Diagnoses and all orders for this visit:    Essential hypertension  -     ECG 12 lead; Future    Atrial fibrillation, unspecified type ([facility] [address] )  Comments:  concerned about her out of pocket cost getting any testing done such as echocardiogram, so has not scheduled cardiol f/u; she agrees to get EKG    Orders:  -     ECG 12 lead; Future    Controlled type 2 diabetes mellitus with stage 3 chronic kidney disease, without long-term current use of insulin (HCC)    Morbid obesity (HCC)          Subjective:   Chief Complaint   Patient presents with    Follow-up     4 - 6 month        Patient ID: Dhruv Tran is a 77 y o  female  routine f/u  feeling well  Pt states, "would like to go back on nutrisystem to lose weight"  Did receive booster COVID vaccine and flu shot at [facility] 12/01/2021   Has not had any f/u with her cardiologist for over a year, states her high deductible is the same and she is concerned about her out of pocket cost getting any testing done such as echocardiogram  Pt states no cp sx- states her nephrologist cut her lisinopril in half "because bp was too low and was feeling tired, still feel tired, but it's better"  A little SOB when going up flight of steps, and a little ankle swelling per pt  Sees her nephrol later this month        10/5/2020  [facility] [address]:  Atrial fibrillation [facility])  Has been present for years  At this point her chads Vasc score is 3  I recommended full anticoagulation  She simply does not want that at present  I also said that an echocardiogram might be helpful because there may be findings on that that would make me less concerned about stroke or more concerned and could help guide therapy  She is too worried about the deductible to have this test   Essential hypertension  Weight loss and salt avoidance would be useful    She notes that she missed her meds last night and that could be a factor today   Morbid obesity ([address] )  We talked about lower carb diet and even about going to the weight loss center in [place]  She is considering    Follow up Plan:  One year EKG and follow-up visit  If she would consent to it I would love to see an echo prior  She has too worried about what her insurance would pay or what we would find to have this at present  HPI:  Patient is seen today in consultation from Dr Merline Rainwater regarding the above  I last saw the patient 5 years ago  At this point the patient is known to have atrial fibrillation for many years  For the most part her heart rate is controlled  No prominent palpitations  She is pretty good about taking her medications but did not take any last night  No recent chest pain or chest pressure  No change in exertional capacity  Walking is somewhat limited however  Results for orders placed or performed in visit on 10/05/20  POCT ECG    Impression    Afib with moderate to rapid response at 101/minute  Otherwise WNL  Most recent or relevant cardiac/vascular testing:    Echocardiogram 07/21/2015:  Normal LV systolic function  No significant LVH            The following portions of the patient's history were reviewed and updated as appropriate: allergies, current medications, past family history, past medical history, past social history, past surgical history and problem list     Review of Systems      Objective:      /94 (BP Location: Left arm, Patient Position: Sitting, Cuff Size: Standard)   Pulse 80   Temp 98 7 °F (37 1 °C) (Tympanic)   Ht 5' 8" (1 727 m)   Wt (!) 137 kg (301 lb)   SpO2 99%   BMI 45 77 kg/m²          Physical Exam  Vitals and nursing note reviewed  Constitutional:       General: She is not in acute distress  Appearance: She is well-developed  She is not ill-appearing, toxic-appearing or diaphoretic  HENT:      Head: Normocephalic and atraumatic     Eyes:      General: Lids are normal  Conjunctiva/sclera: Conjunctivae normal       Pupils: Pupils are equal, round, and reactive to light  Neck:      Thyroid: No thyroid mass or thyromegaly  Vascular: No JVD  Trachea: Trachea normal    Cardiovascular:      Rate and Rhythm: Normal rate and regular rhythm  Pulses: Normal pulses  Heart sounds: Normal heart sounds  Comments: Trace b/l ankle edema  Pulmonary:      Effort: Pulmonary effort is normal       Breath sounds: Normal breath sounds  Chest:   Breasts:      Right: No supraclavicular adenopathy  Left: No supraclavicular adenopathy  Abdominal:      General: Bowel sounds are normal  There is no distension  Palpations: Abdomen is soft  There is no hepatomegaly, splenomegaly or mass  Tenderness: There is no abdominal tenderness  Musculoskeletal:      Cervical back: Neck supple  Lymphadenopathy:      Cervical: No cervical adenopathy  Upper Body:      Right upper body: No supraclavicular adenopathy  Left upper body: No supraclavicular adenopathy  Skin:     General: Skin is warm and dry  Coloration: Skin is not pale  Neurological:      Mental Status: She is alert and oriented to person, place, and time  Gait: Gait normal    Psychiatric:         Mood and Affect: Mood normal          Behavior: Behavior normal  Behavior is cooperative

## 2022-03-14 ENCOUNTER — TELEPHONE (OUTPATIENT)
Dept: NEPHROLOGY | Facility: CLINIC | Age: 67
End: 2022-03-14

## 2022-03-14 NOTE — TELEPHONE ENCOUNTER
Appointment Confirmation   Person confirmed appointment with  If not patient, name of the person lvm for pt     Date and time of appointment 3/15/2022 @11:00    Patient acknowledged and will be at appointment? no    Did you advise the patient that they will need a urine sample if they are a new patient?  N/A    Did you advise the patient to bring their current medications for verification? (including any OTC) No    Additional Information

## 2022-03-15 ENCOUNTER — OFFICE VISIT (OUTPATIENT)
Dept: NEPHROLOGY | Facility: CLINIC | Age: 67
End: 2022-03-15
Payer: COMMERCIAL

## 2022-03-15 VITALS
WEIGHT: 293 LBS | DIASTOLIC BLOOD PRESSURE: 74 MMHG | HEART RATE: 81 BPM | BODY MASS INDEX: 44.41 KG/M2 | OXYGEN SATURATION: 99 % | SYSTOLIC BLOOD PRESSURE: 142 MMHG | HEIGHT: 68 IN

## 2022-03-15 DIAGNOSIS — E55.9 VITAMIN D DEFICIENCY: ICD-10-CM

## 2022-03-15 DIAGNOSIS — R60.0 BILATERAL LOWER EXTREMITY EDEMA: ICD-10-CM

## 2022-03-15 DIAGNOSIS — I48.91 ATRIAL FIBRILLATION, UNSPECIFIED TYPE (HCC): ICD-10-CM

## 2022-03-15 DIAGNOSIS — R73.01 ELEVATED FASTING GLUCOSE: ICD-10-CM

## 2022-03-15 DIAGNOSIS — I10 ESSENTIAL HYPERTENSION: ICD-10-CM

## 2022-03-15 DIAGNOSIS — R06.2 WHEEZING: ICD-10-CM

## 2022-03-15 DIAGNOSIS — N18.32 STAGE 3B CHRONIC KIDNEY DISEASE (HCC): Primary | ICD-10-CM

## 2022-03-15 DIAGNOSIS — E66.01 MORBID OBESITY (HCC): ICD-10-CM

## 2022-03-15 PROCEDURE — 3008F BODY MASS INDEX DOCD: CPT | Performed by: NURSE PRACTITIONER

## 2022-03-15 PROCEDURE — 99214 OFFICE O/P EST MOD 30 MIN: CPT | Performed by: NURSE PRACTITIONER

## 2022-03-15 PROCEDURE — 1160F RVW MEDS BY RX/DR IN RCRD: CPT | Performed by: NURSE PRACTITIONER

## 2022-03-15 PROCEDURE — 3078F DIAST BP <80 MM HG: CPT | Performed by: NURSE PRACTITIONER

## 2022-03-15 PROCEDURE — 3077F SYST BP >= 140 MM HG: CPT | Performed by: NURSE PRACTITIONER

## 2022-03-15 PROCEDURE — 1036F TOBACCO NON-USER: CPT | Performed by: NURSE PRACTITIONER

## 2022-03-15 PROCEDURE — 3066F NEPHROPATHY DOC TX: CPT | Performed by: NURSE PRACTITIONER

## 2022-03-15 NOTE — PROGRESS NOTES
Nephrology   Office Follow-Up  Tiago Sanford 77 y o  female MRN: [id number]    Encounter: [id number]        Assessment & Plan    Tiago Sanford was seen in the [place] office today  All diagnoses and orders for visit:     1  Stage 3b chronic kidney disease ([facility] [address] )  · Most recent SCr 1 3 mg/dL  Appears baseline is around 1 1-1 3 mg/dL dating back to 2016  Lisinopril/HCTZ was decreased at last appointment due to borderline hypotension which is now resolved  · She needs to focus on weight loss, low sodium/low carb diet and increasing dietary potassium  Continue avoidance of NSAIDs  We discussed all of this in detail today  -     Comprehensive metabolic panel; Future; Expected date: 05/10/2022   -     Microalbumin / creatinine urine ratio; Future; Expected date: 05/10/2022   -     Urinalysis with microscopic; Future; Expected date: 05/10/2022  2  Essential hypertension  · Blood pressure is slightly elevated  Prefer she check home blood pressures  She will obtain a BP cuff listed on validatebp org and check BPs at home  Again, recommend low sodium diet with increase in dietary potassium  3  Bilateral lower extremity edema  · Recommend low sodium diet  Currently on very low dose HCTZ  May need increase in dose versus loop depending on clinical progression  4  Vitamin D deficiency   -     Vitamin D 25 hydroxy; Future; Expected date: 05/10/2022  5  Morbid obesity ([facility] [address] )  · She got an Eliptical  6  Elevated fasting glucose  · Per primary care physician  Will follow proteinuria   7  Atrial fibrillation, unspecified type ([facility] [address] )  8  Wheezing  · Per PCP    HPI: Tiago Sanford is a 77 y o  female who is here for scheduled follow-up regarding CKD stage 3  Patient's primary nephrologist is Dr Chriss Love and last visit together was September of 2021  Other pertinent medical problems include obesity, hypertension, prediabetes, atrial fibrillation       Patient is stable from a nephrology perspective however she must focus on weight loss, low sodium/low carb diet and adding dietary potassium  She should obtain home BP cuff and check blood pressures  Blood work to be obtained in May of this year and then return in July with primary nephrologist     ROS:   Review of Systems   Constitutional: Positive for fatigue  Negative for chills and fever  HENT: Negative for ear pain and sore throat  Eyes: Negative for pain and visual disturbance  Respiratory: Positive for wheezing  Negative for cough and shortness of breath  Cardiovascular: Positive for leg swelling  Negative for chest pain and palpitations  Gastrointestinal: Negative for abdominal pain and vomiting  Genitourinary: Negative for dysuria and hematuria  Musculoskeletal: Negative for arthralgias and back pain  Skin: Negative for color change and rash  Neurological: Negative for seizures and syncope  All other systems reviewed and are negative        Allergies: Seasonal ic  [cholestatin]    Medications:   Current Outpatient Medications:     amLODIPine (NORVASC) 5 mg tablet, Take 1 tablet (5 mg total) by mouth daily, Disp: 90 tablet, Rfl: 1    aspirin 81 MG tablet, Take 1 tablet by mouth daily, Disp: , Rfl:     Cholecalciferol (VITAMIN D3) 2000 units capsule, Take 1 capsule by mouth daily, Disp: , Rfl:     CINNAMON PO, Take 1 capsule by mouth daily, Disp: , Rfl:     Coenzyme Q-10 200 MG CAPS, Take 1 capsule by mouth, Disp: , Rfl:     Cranberry 500 MG CAPS, Take 1 capsule by mouth daily, Disp: , Rfl:     lisinopril-hydrochlorothiazide (PRINZIDE,ZESTORETIC) 20-25 MG per tablet, Take 0 5 tablets by mouth every morning, Disp: 90 tablet, Rfl: 1    metoprolol tartrate (LOPRESSOR) 50 mg tablet, TAKE 1 TABLET BY MOUTH TWICE A DAY, Disp: 180 tablet, Rfl: 1    Multiple Vitamins-Minerals (DAILY MULTIPLE VITAMINS/MIN PO), Take 1 tablet by mouth daily, Disp: , Rfl:     Omega-3 Fatty Acids (FISH OIL) 1,000 mg, Take 1 capsule by mouth daily, Disp: , Rfl:    Protein (NUTRA/PRO CHOCOLATE PO), Take by mouth, Disp: , Rfl:     Past Medical History:   Diagnosis Date    Hypokalemia     Last Assessed: 8/12/2016     Hyponatremia     Last Assessed: 8/12/2016     Irregular heartbeat     A Flutter 7/2015 while in hospital for pneumonia, resolved     Pneumonia      History reviewed  No pertinent surgical history  Family History   Problem Relation Age of Onset    Colon cancer Mother     Cancer Mother     Tongue cancer Father     Hypertension Father     Cancer Father     Other Sister         Epilepsy     Hypertension Sister     Colon cancer Brother     Lung cancer Brother         secondary     Hypertension Brother     Cancer Brother       reports that she has never smoked  She has never used smokeless tobacco  She reports that she does not drink alcohol and does not use drugs  Physical Exam:   Vitals:    03/15/22 1053   BP: 142/74   Pulse: 81   SpO2: 99%   Weight: (!) 138 kg (303 lb 12 8 oz)   Height: 5' 8" (1 727 m)     Body mass index is 46 19 kg/m²  General: conscious, cooperative, in no acute distress, appears stated age  Eyes: conjunctivae pale, anicteric sclerae  ENT: lips and mucous membranes moist  Neck: supple, no JVD, no masses  Chest:  fine expiratory wheezing posteriorly  CVS: S1 & S2, normal rate, irregular rhythm  Abdomen: soft, non-tender, non-distended, normoactive bowel sounds, rounded obese  Extremities: moderate edema of both ankles  Skin: no rash   Neuro: awake, alert, oriented       Diagnostic Data:  Lab: I have personally reviewed pertinent lab results  ,   CBC:       CMP: No results found for: SODIUM, K, CL, CO2, ANIONGAP, BUN, CREATININE, GLUCOSE, CALCIUM, AST, ALT, ALKPHOS, PROT, BILITOT, EGFR,   PT/INR: No results found for: PT, INR,   Magnesium: No components found for: MAG,  Phosphorous: No results found for: PHOS    Patient Instructions   Try to lose some weight  Try Nutrisystem like you want     Can go to validate[employer] org to check for validated blood pressure cuffs   Check blood pressure at home  Blood work to be done middle of May   You need a low sodium diet due to your ankle swelling aim for less than 2,000 mg per day  Avoid "white foods" like pasta, bread, lunch meat, etc    Consider increasing the potassium in your diet as well with fresh fruits and vegetables  This will help with blood pressure/heart disease       Portions of the record may have been created with voice recognition software  Occasional wrong word or "sound a like" substitutions may have occurred due to the inherent limitations of voice recognition software  Read the chart carefully and recognize, using context, where substitutions have occurred  If you have any questions, please contact the dictating provider

## 2022-03-15 NOTE — PATIENT INSTRUCTIONS
Try to lose some weight  Try Nutrisystem like you want  Can go to [employer] org to check for validated blood pressure cuffs   Check blood pressure at home  Blood work to be done middle of May   You need a low sodium diet due to your ankle swelling aim for less than 2,000 mg per day  Avoid "white foods" like pasta, bread, lunch meat, etc    Consider increasing the potassium in your diet as well with fresh fruits and vegetables   This will help with blood pressure/heart disease

## 2022-04-08 ENCOUNTER — VBI (OUTPATIENT)
Dept: ADMINISTRATIVE | Facility: OTHER | Age: 67
End: 2022-04-08

## 2022-04-27 DIAGNOSIS — I10 ESSENTIAL HYPERTENSION: ICD-10-CM

## 2022-04-29 RX ORDER — LISINOPRIL AND HYDROCHLOROTHIAZIDE 25; 20 MG/1; MG/1
0.5 TABLET ORAL EVERY MORNING
Qty: 90 TABLET | Refills: 1
Start: 2022-04-29 | End: 2022-05-03 | Stop reason: SDUPTHER

## 2022-05-03 RX ORDER — LISINOPRIL AND HYDROCHLOROTHIAZIDE 25; 20 MG/1; MG/1
0.5 TABLET ORAL EVERY MORNING
Qty: 90 TABLET | Refills: 1 | Status: SHIPPED | OUTPATIENT
Start: 2022-05-03

## 2022-05-04 DIAGNOSIS — I10 ESSENTIAL HYPERTENSION: ICD-10-CM

## 2022-05-04 RX ORDER — AMLODIPINE BESYLATE 5 MG/1
TABLET ORAL
Qty: 90 TABLET | Refills: 1 | Status: SHIPPED | OUTPATIENT
Start: 2022-05-04

## 2022-05-25 LAB
CREAT ?TM UR-SCNC: 79.3 UMOL/L
EXT MICROALBUMIN URINE RANDOM: 2.5
MICROALBUMIN/CREAT UR: 31.5 MG/G{CREAT}

## 2022-07-12 ENCOUNTER — OFFICE VISIT (OUTPATIENT)
Dept: FAMILY MEDICINE CLINIC | Facility: CLINIC | Age: 67
End: 2022-07-12
Payer: COMMERCIAL

## 2022-07-12 VITALS
BODY MASS INDEX: 44.41 KG/M2 | WEIGHT: 293 LBS | HEART RATE: 97 BPM | TEMPERATURE: 98.8 F | SYSTOLIC BLOOD PRESSURE: 142 MMHG | DIASTOLIC BLOOD PRESSURE: 82 MMHG | HEIGHT: 68 IN | OXYGEN SATURATION: 97 %

## 2022-07-12 DIAGNOSIS — I48.21 PERMANENT ATRIAL FIBRILLATION (HCC): Primary | ICD-10-CM

## 2022-07-12 DIAGNOSIS — E11.22 CONTROLLED TYPE 2 DIABETES MELLITUS WITH STAGE 3 CHRONIC KIDNEY DISEASE, WITHOUT LONG-TERM CURRENT USE OF INSULIN (HCC): ICD-10-CM

## 2022-07-12 DIAGNOSIS — Z59.89 HAS HEALTH INSURANCE WITH INADEQUATE COVERAGE OF HEALTH EXPENSES: ICD-10-CM

## 2022-07-12 DIAGNOSIS — R60.0 BILATERAL LOWER EXTREMITY EDEMA: ICD-10-CM

## 2022-07-12 DIAGNOSIS — E66.9 DIABETES MELLITUS TYPE 2 IN OBESE (HCC): ICD-10-CM

## 2022-07-12 DIAGNOSIS — N18.30 CONTROLLED TYPE 2 DIABETES MELLITUS WITH STAGE 3 CHRONIC KIDNEY DISEASE, WITHOUT LONG-TERM CURRENT USE OF INSULIN (HCC): ICD-10-CM

## 2022-07-12 DIAGNOSIS — Z13.220 LIPID SCREENING: ICD-10-CM

## 2022-07-12 DIAGNOSIS — I10 ESSENTIAL HYPERTENSION: ICD-10-CM

## 2022-07-12 DIAGNOSIS — E11.69 DIABETES MELLITUS TYPE 2 IN OBESE (HCC): ICD-10-CM

## 2022-07-12 LAB — SL AMB POCT HEMOGLOBIN AIC: 5.5 (ref ?–6.5)

## 2022-07-12 PROCEDURE — 3066F NEPHROPATHY DOC TX: CPT | Performed by: INTERNAL MEDICINE

## 2022-07-12 PROCEDURE — 3044F HG A1C LEVEL LT 7.0%: CPT | Performed by: INTERNAL MEDICINE

## 2022-07-12 PROCEDURE — 83036 HEMOGLOBIN GLYCOSYLATED A1C: CPT | Performed by: FAMILY MEDICINE

## 2022-07-12 PROCEDURE — 99214 OFFICE O/P EST MOD 30 MIN: CPT | Performed by: FAMILY MEDICINE

## 2022-07-12 SDOH — ECONOMIC STABILITY - INCOME SECURITY: OTHER PROBLEMS RELATED TO HOUSING AND ECONOMIC CIRCUMSTANCES: Z59.89

## 2022-07-12 NOTE — PROGRESS NOTES
Assessment/Plan:         Diagnoses and all orders for this visit:    Permanent atrial fibrillation [facility])  Comments:  Did not obtain EKG ordered at last visit in January "forgot about it", she has not seen her cardiologist in almost 3 yrs, refused anticoagualation then and still refusing now  Rate well controlled on BB and Ca+ blocker  Orders:  -     Ambulatory Referral to Cardiology; Future    Has health insurance with inadequate coverage of health expenses  -     Ambulatory Referral to Cardiology; Future    Controlled type 2 diabetes mellitus with stage 3 chronic kidney disease, without long-term current use of insulin ([facility])  -     Lipid panel; Future  -     POCT hemoglobin A1c    Diabetes mellitus type 2 in obese ([facility])    Bilateral lower extremity edema  Comments:  stable, cpm    Essential hypertension  Comments:  fairly controlled, due for f/u with her cardiologist  Orders:  -     Ambulatory Referral to Cardiology; Future    Lipid screening  -     Lipid panel; Future          Subjective:   Chief Complaint   Patient presents with    Follow-up     According to blood pressure cuff at home, has irregular heart beat  Has orders for mammo, crc and dexa to be completed         Patient ID: London Alford is a 79 y o  female      Scheduled routine f/u visit, but pt also has new problem - c/o new bp machine she got has been showing "irrregular heartbeat" for about a month- happens "once in awhile" no sx associated  States she started [employer]  Has known chronic Afib- on BB and Ca blocker for heart rate control  Denies palpitations, CP, dizziness  Only SOB sx occurring after getting done with her walk- she started exercise walking 2 weeks ago- walks about 1-2 blocks at "fast pace" and feels a little bit SOB at the end   according to the chart lost about 8 pounds since March this year  Tired after using push-mower for portions of her yard- no CP or palp then either  Unchanged minor LE edema per pt  Did not obtain EKG ordered at last visit in January "forgot about it", she has not seen her cardiologist in almost 3 years, refused anticoagualation for her chronic Afib then and still refuses now when I discuss with her today  Home bp's "usually around 126/74 per pt"  States HR on her bp machine stays 80's-90      1/3/2022  [facility]  routine f/u  feeling well  Pt states, "would like to go back on [employer] to lose weight"  Did receive booster COVID vaccine and flu shot at [facility] 12/01/2021   Has not had any f/u with her cardiologist for over a year, states her high deductible is the same and she is concerned about her out of pocket cost getting any testing done such as echocardiogram  Pt states no cp sx- states her nephrologist cut her lisinopril in half "because bp was too low and was feeling tired, still feel tired, but it's better"  A little SOB when going up flight of steps, and a little ankle swelling per pt  Sees her nephrol later this month  Assessment/Plan:   Diagnoses and all orders for this visit:  Essential hypertension  -     ECG 12 lead; Future  Atrial fibrillation, unspecified type ([facility] [address] )  Comments:  concerned about her out of pocket cost getting any testing done such as echocardiogram, so has not scheduled cardiol f/u; she agrees to get EKG    Orders:  -     ECG 12 lead; Future   Controlled type 2 diabetes mellitus with stage 3 chronic kidney disease, without long-term current use of insulin ([facility] [address] )  Morbid obesity ([facility] [address] )       3/15/2022  [facility] was seen in the [place] office today  All diagnoses and orders for visit:   1  Stage 3b chronic kidney disease ([facility] [address] )  ? Most recent SCr 1 3 mg/dL  Appears baseline is around 1 1-1 3 mg/dL dating back to 2016  Lisinopril/HCTZ was decreased at last appointment due to borderline hypotension which is now resolved  ?  She needs to focus on weight loss, low sodium/low carb diet and increasing dietary potassium  Continue avoidance of NSAIDs  We discussed all of this in detail today  -     Comprehensive metabolic panel; Future; Expected date: 05/10/2022        -     Microalbumin / creatinine urine ratio; Future; Expected date: 05/10/2022        -     Urinalysis with microscopic; Future; Expected date: 05/10/2022  2  Essential hypertension  ? Blood pressure is slightly elevated  Prefer she check home blood pressures  She will obtain a BP cuff listed on [employer] and check BPs at home  Again, recommend low sodium diet with increase in dietary potassium  3  Bilateral lower extremity edema  ? Recommend low sodium diet  Currently on very low dose HCTZ  May need increase in dose versus loop depending on clinical progression  4  Vitamin D deficiency        -     Vitamin D 25 hydroxy; Future; Expected date: 05/10/2022  5  Morbid obesity ([address] )  ? She got an Eliptical  6  Elevated fasting glucose  ? Per primary care physician  Will follow proteinuria   7  Atrial fibrillation, unspecified type ([facility] [address] )  8  Wheezing  ? Per PCP  HPI: Cassie White is a 77 y o  female who is here for scheduled follow-up regarding CKD stage 3  Patient's primary nephrologist is Dr Clemente Lira and last visit together was September of 2021  Other pertinent medical problems include obesity, hypertension, prediabetes, atrial fibrillation  Patient is stable from a nephrology perspective however she must focus on weight loss, low sodium/low carb diet and adding dietary potassium  She should obtain home BP cuff and check blood pressures  Blood work to be obtained in May of this year and then return in July with primary nephrologist        10/5/2020  [facility]  Plan:  Atrial fibrillation [facility])  Has been present for years  At this point her chads Vasc score is 3  I recommended full anticoagulation  She simply does not want that at present    I also said that an echocardiogram might be helpful because there may be findings on that that would make me less concerned about stroke or more concerned and could help guide therapy  She is too worried about the deductible to have this test   Essential hypertension  Weight loss and salt avoidance would be useful  She notes that she missed her meds last night and that could be a factor today  Morbid obesity ([address] )  We talked about lower carb diet and even about going to the weight loss center in [place]  She is considering  Follow up Plan:  One year EKG and follow-up visit  If she would consent to it I would love to see an echo prior  She has too worried about what her insurance would pay or what we would find to have this at present  HPI:  Patient is seen today in consultation from Dr Marine Giron regarding the above  I last saw the patient 5 years ago  At this point the patient is known to have atrial fibrillation for many years  For the most part her heart rate is controlled  No prominent palpitations  She is pretty good about taking her medications but did not take any last night  No recent chest pain or chest pressure  No change in exertional capacity  Walking is somewhat limited however  The following portions of the patient's history were reviewed and updated as appropriate: allergies, current medications, past family history, past medical history, past social history, past surgical history and problem list     Review of Systems   Constitutional: Negative  Respiratory: Negative  Cardiovascular: Positive for leg swelling (unchanged)  Negative for chest pain and palpitations  Neurological: Negative  Objective:      /82 (BP Location: Left arm, Patient Position: Sitting)   Pulse 97   Temp 98 8 °F (37 1 °C)   Ht 5' 8" (1 727 m)   Wt 134 kg (296 lb)   SpO2 97%   BMI 45 01 kg/m²          Physical Exam  Constitutional:       General: She is not in acute distress       Appearance: She is well-developed and well-groomed  She is not ill-appearing, toxic-appearing or diaphoretic  HENT:      Head: Normocephalic and atraumatic  Mouth/Throat:      Pharynx: Uvula midline  Neck:      Trachea: Phonation normal    Cardiovascular:      Rate and Rhythm: Normal rate  Rhythm irregularly irregular  Pulses: Normal pulses  Heart sounds: S1 normal and S2 normal      No friction rub  No gallop  Comments: Trace to 1+ edema b/l  Pulmonary:      Effort: Pulmonary effort is normal       Breath sounds: Normal breath sounds and air entry  Skin:     Coloration: Skin is not pale  Neurological:      Mental Status: She is alert and oriented to person, place, and time  Psychiatric:         Mood and Affect: Mood normal          Behavior: Behavior normal  Behavior is cooperative

## 2022-07-28 ENCOUNTER — OFFICE VISIT (OUTPATIENT)
Dept: NEPHROLOGY | Facility: CLINIC | Age: 67
End: 2022-07-28
Payer: COMMERCIAL

## 2022-07-28 VITALS
DIASTOLIC BLOOD PRESSURE: 80 MMHG | SYSTOLIC BLOOD PRESSURE: 134 MMHG | HEART RATE: 94 BPM | BODY MASS INDEX: 45.01 KG/M2 | OXYGEN SATURATION: 98 % | WEIGHT: 293 LBS

## 2022-07-28 DIAGNOSIS — Q61.02 MULTIPLE RENAL CYSTS: ICD-10-CM

## 2022-07-28 DIAGNOSIS — E66.9 DIABETES MELLITUS TYPE 2 IN OBESE (HCC): ICD-10-CM

## 2022-07-28 DIAGNOSIS — R80.1 PERSISTENT PROTEINURIA: ICD-10-CM

## 2022-07-28 DIAGNOSIS — E11.69 DIABETES MELLITUS TYPE 2 IN OBESE (HCC): ICD-10-CM

## 2022-07-28 DIAGNOSIS — E66.01 MORBID OBESITY (HCC): ICD-10-CM

## 2022-07-28 DIAGNOSIS — N18.32 STAGE 3B CHRONIC KIDNEY DISEASE (HCC): Primary | ICD-10-CM

## 2022-07-28 DIAGNOSIS — I10 ESSENTIAL HYPERTENSION: ICD-10-CM

## 2022-07-28 PROCEDURE — 3075F SYST BP GE 130 - 139MM HG: CPT | Performed by: INTERNAL MEDICINE

## 2022-07-28 PROCEDURE — 3079F DIAST BP 80-89 MM HG: CPT | Performed by: INTERNAL MEDICINE

## 2022-07-28 PROCEDURE — 1160F RVW MEDS BY RX/DR IN RCRD: CPT | Performed by: INTERNAL MEDICINE

## 2022-07-28 PROCEDURE — 99214 OFFICE O/P EST MOD 30 MIN: CPT | Performed by: INTERNAL MEDICINE

## 2022-07-28 NOTE — ASSESSMENT & PLAN NOTE
She will continue [facility] systems    Would consider Ozempic or other semaglutide for weight loss

## 2022-07-28 NOTE — ASSESSMENT & PLAN NOTE
microalbumin to creatinine ratio has decreased to 31 5 milligrams/gram   This is a marked improvement from 2 years ago    Will continue to monitor and continue lisinopril HCT

## 2022-07-28 NOTE — PROGRESS NOTES
[address] [facility] [place], [place]    Name: Dena Peacock  YOB: 1955      Assessment/Plan:       Problem List Items Addressed This Visit        Endocrine    Diabetes mellitus type 2 in obese [facility])       Lab Results   Component Value Date    HGBA1C 5 5 07/12/2022    A1c is nondiabetic            Cardiovascular and Mediastinum    Essential hypertension      Blood pressure is well controlled from 120-130 range  She monitors at home with good control            Genitourinary    Stage 3 chronic kidney disease [facility]) - Primary     Lab Results   Component Value Date    EGFR 42 09/14/2021    EGFR 48 10/13/2018    EGFR 55 11/11/2017    CREATININE 1 33 (H) 09/14/2021    CREATININE 1 20 10/13/2018    CREATININE 1 16 04/14/2018   Has stage IIIB chronic kidney disease with creatinine of 1 39 and an EGFR of 42 mils per minute  She has a normal kidney ultrasound   She will concentrate on losing weight which will improve filtration and increasing her exercise         Multiple renal cysts       Other    Morbid obesity ([address] )      She will continue [employer] systems  Would consider Ozempic or other semaglutide for weight loss         Persistent proteinuria      microalbumin to creatinine ratio has decreased to 31 5 milligrams/gram   This is a marked improvement from 2 years ago  Will continue to monitor and continue lisinopril HCT                 Subjective:      Patient ID: Dena Peacock is a 79 y o  female  Referred by Dr Ethan Malhotra    HPI Has a history of CKD 3 with a baseline creatinine of 1 2-1 3 mg/dl She is taking 1/2 tab of lisinopril HCT to 10/12 5 mg/ a day   she was on [employer] but stopped due to the sodium content which was actually acceptable  She has a history of hypertension and atrial flutter    The following portions of the patient's history were reviewed and updated as appropriate: allergies, current medications, past family history, past medical history, past social history, past surgical history and problem list     Review of Systems   Constitutional: Negative for fatigue  HENT: Negative for hearing loss  Eyes: Negative for visual disturbance  Respiratory: Negative for cough, choking, chest tightness and shortness of breath  Cardiovascular: Negative for chest pain, palpitations and leg swelling  Gastrointestinal: Negative for abdominal pain, blood in stool, constipation, diarrhea, nausea and vomiting  Genitourinary: Negative for difficulty urinating, dysuria, frequency, hematuria and urgency  Musculoskeletal: Positive for arthralgias  Negative for back pain  Neurological: Negative for dizziness, weakness and light-headedness  Hematological: Does not bruise/bleed easily  Psychiatric/Behavioral: Negative for dysphoric mood  Social History     Socioeconomic History    Marital status: Single     Spouse name: None    Number of children: None    Years of education: None    Highest education level: None   Occupational History    None   Tobacco Use    Smoking status: Never Smoker    Smokeless tobacco: Never Used   Vaping Use    Vaping Use: Never used   Substance and Sexual Activity    Alcohol use: No    Drug use: No    Sexual activity: Not Currently   Other Topics Concern    None   Social History Narrative    None     Social Determinants of Health     Financial Resource Strain: Not on file   Food Insecurity: Not on file   Transportation Needs: Not on file   Physical Activity: Not on file   Stress: Not on file   Social Connections: Not on file   Intimate Partner Violence: Not on file   Housing Stability: Not on file     Past Medical History:   Diagnosis Date    Hypokalemia     Last Assessed: 8/12/2016     Hyponatremia     Last Assessed: 8/12/2016     Irregular heartbeat     A Flutter 7/2015 while in hospital for pneumonia, resolved     Pneumonia      History reviewed  No pertinent surgical history      Current Outpatient Medications:     amLODIPine (NORVASC) 5 mg tablet, TAKE 1 TABLET BY MOUTH EVERY DAY, Disp: 90 tablet, Rfl: 1    aspirin 81 MG tablet, Take 1 tablet by mouth daily, Disp: , Rfl:     Cholecalciferol (VITAMIN D3) 2000 units capsule, Take 1 capsule by mouth daily, Disp: , Rfl:     CINNAMON PO, Take 1 capsule by mouth daily, Disp: , Rfl:     Coenzyme Q-10 200 MG CAPS, Take 1 capsule by mouth, Disp: , Rfl:     Cranberry 500 MG CAPS, Take 1 capsule by mouth daily, Disp: , Rfl:     lisinopril-hydrochlorothiazide (PRINZIDE,ZESTORETIC) 20-25 MG per tablet, Take 0 5 tablets by mouth every morning, Disp: 90 tablet, Rfl: 1    metoprolol tartrate (LOPRESSOR) 50 mg tablet, TAKE 1 TABLET BY MOUTH TWICE A DAY, Disp: 180 tablet, Rfl: 1    Multiple Vitamins-Minerals (DAILY MULTIPLE VITAMINS/MIN PO), Take 1 tablet by mouth daily, Disp: , Rfl:     Omega-3 Fatty Acids (FISH OIL) 1,000 mg, Take 1 capsule by mouth daily, Disp: , Rfl:     Protein (NUTRA/PRO CHOCOLATE PO), Take by mouth, Disp: , Rfl:     Lab Results   Component Value Date     04/14/2018    SODIUM 138 09/14/2021    K 4 3 09/14/2021     09/14/2021    CO2 27 09/14/2021    ANIONGAP 13 4 04/14/2018    AGAP 3 (L) 09/14/2021    BUN 38 (H) 09/14/2021    CREATININE 1 33 (H) 09/14/2021    GLUF 98 09/14/2021    CALCIUM 8 9 09/14/2021    AST 5 09/14/2021    ALT 24 09/14/2021    ALKPHOS 98 09/14/2021    PROT 7 2 04/14/2018    TP 7 3 09/14/2021    BILITOT 0 4 04/14/2018    TBILI 0 32 09/14/2021    EGFR 42 09/14/2021     Lab Results   Component Value Date    WBC 5 58 09/14/2021    HGB 11 8 09/14/2021    HCT 38 1 09/14/2021    MCV 93 09/14/2021     09/14/2021     Lab Results   Component Value Date    CHOLESTEROL 161 10/13/2018    CHOLESTEROL 182 04/19/2017     Lab Results   Component Value Date    HDL 51 10/13/2018    HDL 68 (H) 04/19/2017    HDL 47 07/27/2016     Lab Results   Component Value Date    LDLCALC 92 10/13/2018    LDLCALC 100 04/19/2017 Lab Results   Component Value Date    TRIG 88 10/13/2018    TRIG 68 04/19/2017    TRIG 104 07/27/2016     No results found for: [place], [place]  Lab Results   Component Value Date    PMM0AFADVKJY 3 460 04/19/2017     Lab Results   Component Value Date    CALCIUM 8 9 09/14/2021     No results found for: SPEP, UPEP  No results found for: [place]  5/25/22 BUN/Cr 33/1 39  eGFR 42  Vit D 38  MAC 31 5  A1C 5 5      Objective:      /80   Pulse 94   Wt 134 kg (296 lb)   SpO2 98%   BMI 45 01 kg/m²   Lost 7lbs since March       Physical Exam  Vitals reviewed  Constitutional:       General: She is not in acute distress  Appearance: She is obese  She is not toxic-appearing or diaphoretic  HENT:      Head: Normocephalic and atraumatic  Right Ear: External ear normal       Left Ear: External ear normal    Eyes:      Extraocular Movements: Extraocular movements intact  Conjunctiva/sclera: Conjunctivae normal       Pupils: Pupils are equal, round, and reactive to light  Neck:      Vascular: No carotid bruit  Cardiovascular:      Rate and Rhythm: Normal rate and regular rhythm  Pulmonary:      Effort: Pulmonary effort is normal  No respiratory distress  Breath sounds: Normal breath sounds  No wheezing or rales  Abdominal:      General: Abdomen is flat  Bowel sounds are normal       Palpations: Abdomen is soft  Musculoskeletal:         General: Normal range of motion  Cervical back: Normal range of motion and neck supple  No rigidity  Right lower leg: No edema  Left lower leg: No edema  Lymphadenopathy:      Cervical: No cervical adenopathy  Skin:     General: Skin is warm and dry  Neurological:      General: No focal deficit present  Mental Status: She is alert and oriented to person, place, and time  Psychiatric:         Mood and Affect: Mood normal          Behavior: Behavior normal          Thought Content:  Thought content normal          Judgment: Judgment normal

## 2022-07-28 NOTE — ASSESSMENT & PLAN NOTE
Lab Results   Component Value Date    EGFR 42 09/14/2021    EGFR 48 10/13/2018    EGFR 55 11/11/2017    CREATININE 1 33 (H) 09/14/2021    CREATININE 1 20 10/13/2018    CREATININE 1 16 04/14/2018   Has stage IIIB chronic kidney disease with creatinine of 1 39 and an EGFR of 42 mils per minute      She has a normal kidney ultrasound   She will concentrate on losing weight which will improve filtration and increasing her exercise

## 2022-09-20 ENCOUNTER — VBI (OUTPATIENT)
Dept: ADMINISTRATIVE | Facility: OTHER | Age: 67
End: 2022-09-20

## 2022-10-25 ENCOUNTER — OFFICE VISIT (OUTPATIENT)
Dept: FAMILY MEDICINE CLINIC | Facility: CLINIC | Age: 67
End: 2022-10-25

## 2022-10-25 VITALS
SYSTOLIC BLOOD PRESSURE: 122 MMHG | HEIGHT: 68 IN | TEMPERATURE: 98.9 F | OXYGEN SATURATION: 99 % | DIASTOLIC BLOOD PRESSURE: 84 MMHG | WEIGHT: 292 LBS | HEART RATE: 94 BPM | BODY MASS INDEX: 44.25 KG/M2

## 2022-10-25 DIAGNOSIS — I10 ESSENTIAL HYPERTENSION: ICD-10-CM

## 2022-10-25 DIAGNOSIS — E11.22 CONTROLLED TYPE 2 DIABETES MELLITUS WITH STAGE 3 CHRONIC KIDNEY DISEASE, WITHOUT LONG-TERM CURRENT USE OF INSULIN (HCC): ICD-10-CM

## 2022-10-25 DIAGNOSIS — Z12.31 BREAST CANCER SCREENING BY MAMMOGRAM: ICD-10-CM

## 2022-10-25 DIAGNOSIS — Z12.11 COLON CANCER SCREENING: ICD-10-CM

## 2022-10-25 DIAGNOSIS — Z13.29 THYROID DISORDER SCREEN: ICD-10-CM

## 2022-10-25 DIAGNOSIS — Z13.0 SCREENING FOR DEFICIENCY ANEMIA: ICD-10-CM

## 2022-10-25 DIAGNOSIS — Z23 NEED FOR INFLUENZA VACCINATION: ICD-10-CM

## 2022-10-25 DIAGNOSIS — E66.01 MORBID OBESITY (HCC): ICD-10-CM

## 2022-10-25 DIAGNOSIS — N18.30 CONTROLLED TYPE 2 DIABETES MELLITUS WITH STAGE 3 CHRONIC KIDNEY DISEASE, WITHOUT LONG-TERM CURRENT USE OF INSULIN (HCC): ICD-10-CM

## 2022-10-25 DIAGNOSIS — Z78.0 POSTMENOPAUSAL: ICD-10-CM

## 2022-10-25 DIAGNOSIS — E11.9 ENCOUNTER FOR DIABETIC FOOT EXAM (HCC): ICD-10-CM

## 2022-10-25 DIAGNOSIS — Z00.00 MEDICARE ANNUAL WELLNESS VISIT, SUBSEQUENT: Primary | ICD-10-CM

## 2022-10-25 PROBLEM — E55.9 VITAMIN D DEFICIENCY: Status: RESOLVED | Noted: 2022-03-15 | Resolved: 2022-10-25

## 2022-10-25 PROBLEM — R73.01 ELEVATED FASTING GLUCOSE: Status: RESOLVED | Noted: 2019-08-08 | Resolved: 2022-10-25

## 2022-10-25 NOTE — PROGRESS NOTES
Patient's shoes and socks removed  Right Foot/Ankle   Right Foot Inspection  Skin Exam: skin normal, skin intact, dry skin, callus and callus  No warmth, no erythema, no maceration, no abnormal color, no pre-ulcer and no ulcer  Toe Exam: ROM and strength within normal limits  Sensory   Monofilament testing: intact    Vascular  The right DP pulse is 2+  The right PT pulse is 2+  Left Foot/Ankle  Left Foot Inspection  Skin Exam: skin normal, skin intact, dry skin and callus  No warmth, no erythema, no maceration, normal color, no pre-ulcer and no ulcer  Toe Exam: ROM and strength within normal limits  Sensory   Monofilament testing: intact    Vascular  The left DP pulse is 2+  The left PT pulse is 2+       Assign Risk Category  No deformity present  No loss of protective sensation  No weak pulses  Risk: 0

## 2022-10-25 NOTE — PATIENT INSTRUCTIONS
Medicare Preventive Visit Patient Instructions  Thank you for completing your Welcome to Medicare Visit or Medicare Annual Wellness Visit today  Your next wellness visit will be due in one year (10/26/2023)  The screening/preventive services that you may require over the next 5-10 years are detailed below  Some tests may not apply to you based off risk factors and/or age  Screening tests ordered at today's visit but not completed yet may show as past due  Also, please note that scanned in results may not display below  Preventive Screenings:  Service Recommendations Previous Testing/Comments   Colorectal Cancer Screening  * Colonoscopy    * Fecal Occult Blood Test (FOBT)/Fecal Immunochemical Test (FIT)  * Fecal DNA/Cologuard Test  * Flexible Sigmoidoscopy Age: 39-70 years old   Colonoscopy: every 10 years (may be performed more frequently if at higher risk)  OR  FOBT/FIT: every 1 year  OR  Cologuard: every 3 years  OR  Sigmoidoscopy: every 5 years  Screening may be recommended earlier than age 39 if at higher risk for colorectal cancer  Also, an individualized decision between you and your healthcare provider will decide whether screening between the ages of 74-80 would be appropriate  Colonoscopy: Not on file  FOBT/FIT: Not on file  Cologuard: Not on file  Sigmoidoscopy: Not on file          Breast Cancer Screening Age: 36 years old  Frequency: every 1-2 years  Not required if history of left and right mastectomy Mammogram: Not on file        Cervical Cancer Screening Between the ages of 21-29, pap smear recommended once every 3 years  Between the ages of 33-67, can perform pap smear with HPV co-testing every 5 years     Recommendations may differ for women with a history of total hysterectomy, cervical cancer, or abnormal pap smears in past  Pap Smear: Not on file    Screening Not Indicated   Hepatitis C Screening Once for adults born between [facility]  More frequently in patients at high risk for Hepatitis C Hep C Antibody: 10/31/2020    Screening Current   Diabetes Screening 1-2 times per year if you're at risk for diabetes or have pre-diabetes Fasting glucose: 98 mg/dL (9/14/2021)  A1C: 5 5 (7/12/2022)  Screening Not Indicated  History Diabetes   Cholesterol Screening Once every 5 years if you don't have a lipid disorder  May order more often based on risk factors  Lipid panel: 10/31/2020    Screening Current     Other Preventive Screenings Covered by Medicare:  1  Abdominal Aortic Aneurysm (AAA) Screening: covered once if your at risk  You're considered to be at risk if you have a family history of AAA  2  Lung Cancer Screening: covers low dose CT scan once per year if you meet all of the following conditions: (1) Age 50-69; (2) No signs or symptoms of lung cancer; (3) Current smoker or have quit smoking within the last 15 years; (4) You have a tobacco smoking history of at least 20 pack years (packs per day multiplied by number of years you smoked); (5) You get a written order from a healthcare provider  3  Glaucoma Screening: covered annually if you're considered high risk: (1) You have diabetes OR (2) Family history of glaucoma OR (3)  aged 48 and older OR (3)  American aged 72 and older  3  Osteoporosis Screening: covered every 2 years if you meet one of the following conditions: (1) You're estrogen deficient and at risk for osteoporosis based off medical history and other findings; (2) Have a vertebral abnormality; (3) On glucocorticoid therapy for more than 3 months; (4) Have primary hyperparathyroidism; (5) On osteoporosis medications and need to assess response to drug therapy  · Last bone density test (DXA Scan): Not on file  5  HIV Screening: covered annually if you're between the age of 12-76  Also covered annually if you are younger than 13 and older than 72 with risk factors for HIV infection   For pregnant patients, it is covered up to 3 times per pregnancy  Immunizations:  Immunization Recommendations   Influenza Vaccine Annual influenza vaccination during flu season is recommended for all persons aged >= 6 months who do not have contraindications   Pneumococcal Vaccine   * Pneumococcal conjugate vaccine = PCV13 (Prevnar 13), PCV15 (Vaxneuvance), PCV20 (Prevnar 20)  * Pneumococcal polysaccharide vaccine = PPSV23 (Pneumovax) Adults 25-60 years old: 1-3 doses may be recommended based on certain risk factors  Adults 72 years old: 1-2 doses may be recommended based off what pneumonia vaccine you previously received   Hepatitis B Vaccine 3 dose series if at intermediate or high risk (ex: diabetes, end stage renal disease, liver disease)   Tetanus (Td) Vaccine - COST NOT COVERED BY MEDICARE PART B Following completion of primary series, a booster dose should be given every 10 years to maintain immunity against tetanus  Td may also be given as tetanus wound prophylaxis  Tdap Vaccine - COST NOT COVERED BY MEDICARE PART B Recommended at least once for all adults  For pregnant patients, recommended with each pregnancy  Shingles Vaccine (Shingrix) - COST NOT COVERED BY MEDICARE PART B  2 shot series recommended in those aged 48 and above     Health Maintenance Due:      Topic Date Due   • Breast Cancer Screening: Mammogram  Never done   • Colorectal Cancer Screening  Never done   • Hepatitis C Screening  Completed     Immunizations Due:      Topic Date Due   • COVID-19 Vaccine (4 - Booster for Moderna series) 04/01/2022   • Influenza Vaccine (1) 09/01/2022     Advance Directives   What are advance directives? Advance directives are legal documents that state your wishes and plans for medical care  These plans are made ahead of time in case you lose your ability to make decisions for yourself  Advance directives can apply to any medical decision, such as the treatments you want, and if you want to donate organs  What are the types of advance directives? There are many types of advance directives, and each state has rules about how to use them  You may choose a combination of any of the following:  · Living will: This is a written record of the treatment you want  You can also choose which treatments you do not want, which to limit, and which to stop at a certain time  This includes surgery, medicine, IV fluid, and tube feedings  · Durable power of  for healthcare [facility]): This is a written record that states who you want to make healthcare choices for you when you are unable to make them for yourself  This person, called a proxy, is usually a family member or a friend  You may choose more than 1 proxy  · Do not resuscitate (DNR) order:  A DNR order is used in case your heart stops beating or you stop breathing  It is a request not to have certain forms of treatment, such as CPR  A DNR order may be included in other types of advance directives  · Medical directive: This covers the care that you want if you are in a coma, near death, or unable to make decisions for yourself  You can list the treatments you want for each condition  Treatment may include pain medicine, surgery, blood transfusions, dialysis, IV or tube feedings, and a ventilator (breathing machine)  · Values history: This document has questions about your views, beliefs, and how you feel and think about life  This information can help others choose the care that you would choose  Why are advance directives important? An advance directive helps you control your care  Although spoken wishes may be used, it is better to have your wishes written down  Spoken wishes can be misunderstood, or not followed  Treatments may be given even if you do not want them  An advance directive may make it easier for your family to make difficult choices about your care     Weight Management   Why it is important to manage your weight:  Being overweight increases your risk of health conditions such as heart disease, high blood pressure, type 2 diabetes, and certain types of cancer  It can also increase your risk for osteoarthritis, sleep apnea, and other respiratory problems  Aim for a slow, steady weight loss  Even a small amount of weight loss can lower your risk of health problems  How to lose weight safely:  A safe and healthy way to lose weight is to eat fewer calories and get regular exercise  You can lose up about 1 pound a week by decreasing the number of calories you eat by 500 calories each day  Healthy meal plan for weight management:  A healthy meal plan includes a variety of foods, contains fewer calories, and helps you stay healthy  A healthy meal plan includes the following:  · Eat whole-grain foods more often  A healthy meal plan should contain fiber  Fiber is the part of grains, fruits, and vegetables that is not broken down by your body  Whole-grain foods are healthy and provide extra fiber in your diet  Some examples of whole-grain foods are whole-wheat breads and pastas, oatmeal, brown rice, and bulgur  · Eat a variety of vegetables every day  Include dark, leafy greens such as spinach, kale, anastasia greens, and mustard greens  Eat yellow and orange vegetables such as carrots, sweet potatoes, and winter squash  · Eat a variety of fruits every day  Choose fresh or canned fruit (canned in its own juice or light syrup) instead of juice  Fruit juice has very little or no fiber  · Eat low-fat dairy foods  Drink fat-free (skim) milk or 1% milk  Eat fat-free yogurt and low-fat cottage cheese  Try low-fat cheeses such as mozzarella and other reduced-fat cheeses  · Choose meat and other protein foods that are low in fat  Choose beans or other legumes such as split peas or lentils  Choose fish, skinless poultry (chicken or turkey), or lean cuts of red meat (beef or pork)  Before you cook meat or poultry, cut off any visible fat  · Use less fat and oil  Try baking foods instead of frying them  Add less fat, such as margarine, sour cream, regular salad dressing and mayonnaise to foods  Eat fewer high-fat foods  Some examples of high-fat foods include french fries, doughnuts, ice cream, and cakes  · Eat fewer sweets  Limit foods and drinks that are high in sugar  This includes candy, cookies, regular soda, and sweetened drinks  Exercise:  Exercise at least 30 minutes per day on most days of the week  Some examples of exercise include walking, biking, dancing, and swimming  You can also fit in more physical activity by taking the stairs instead of the elevator or parking farther away from stores  Ask your healthcare provider about the best exercise plan for you  © Copyright [employer] 2018 Information is for End User's use only and may not be sold, redistributed or otherwise used for commercial purposes   All illustrations and images included in CareNotes® are the copyrighted property of [employer]  or [address] [place] [address]

## 2022-10-25 NOTE — PROGRESS NOTES
Assessment and Plan:   Sandra Lei was seen today for medicare wellness visit  Diagnoses and all orders for this visit:    Medicare annual wellness visit, subsequent    Controlled type 2 diabetes mellitus with stage 3 chronic kidney disease, without long-term current use of insulin ([place] [address] )  -     HEMOGLOBIN A1C W/ EAG ESTIMATION; Future  -     Comprehensive metabolic panel; Future  -     TSH, 3rd generation with Free T4 reflex; Future    Encounter for diabetic foot exam ([place] [address] )    Breast cancer screening by mammogram  -     Mammo screening bilateral w 3d & cad; Future    Colon cancer screening  -     Occult Bloood,Fecal Immunochemical; Future    Need for influenza vaccination  Comments:  advised also to obtain bivalent COVID vaccine booster, but wait about 3 weeks from today  Orders:  -     influenza vaccine, high-dose, PF 0 7 mL (FLUZONE HIGH-DOSE)    Morbid obesity ([facility] )  -     TSH, 3rd generation with Free T4 reflex; Future    Essential hypertension  -     Comprehensive metabolic panel; Future    Postmenopausal  -     DXA bone density spine hip and pelvis;  Future    Screening for deficiency anemia  -     CBC and differential; Future    Thyroid disorder screen  -     CBC and differential; Future        Problem List Items Addressed This Visit        Endocrine    Controlled type 2 diabetes mellitus with stage 3 chronic kidney disease, without long-term current use of insulin ([facility])    Relevant Orders    HEMOGLOBIN A1C W/ EAG ESTIMATION    Comprehensive metabolic panel    TSH, 3rd generation with Free T4 reflex       Cardiovascular and Mediastinum    Essential hypertension    Relevant Orders    Comprehensive metabolic panel       Other    Morbid obesity ([facility] )    Relevant Orders    TSH, 3rd generation with Free T4 reflex    Medicare annual wellness visit, subsequent - Primary    Encounter for diabetic foot exam ([place] [address] )      Other Visit Diagnoses     Breast cancer screening by mammogram        Relevant Orders    Mammo screening bilateral w 3d & cad    Colon cancer screening        Relevant Orders    Occult Bloood,Fecal Immunochemical    Need for influenza vaccination        advised also to obtain bivalent COVID vaccine booster, but wait about 3 weeks from today    Relevant Orders    influenza vaccine, high-dose, PF 0 7 mL (FLUZONE HIGH-DOSE)    Postmenopausal        Relevant Orders    DXA bone density spine hip and pelvis    Screening for deficiency anemia        Relevant Orders    CBC and differential    Thyroid disorder screen        Relevant Orders    CBC and differential           Preventive health issues were discussed with patient, and age appropriate screening tests were ordered as noted in patient's After Visit Summary  Personalized health advice and appropriate referrals for health education or preventive services given if needed, as noted in patient's After Visit Summary  BMI Counseling: Body mass index is 44 4 kg/m²  The BMI is above normal  Nutrition recommendations include reducing portion sizes and decreasing overall calorie intake  Exercise recommendations include exercising 3-5 times per week         History of Present Illness:     Patient presents for a Medicare Wellness Visit and f/u    Medicare AWV and f/u visit     Patient Care Team:  Sita Camargo DO as PCP - General Vivek Luque DO     Review of Systems:     Review of Systems     Problem List:     Patient Active Problem List   Diagnosis   • Atrial fibrillation [facility])   • Stage 3 chronic kidney disease ([facility] )   • Essential hypertension   • Morbid obesity ([facility] )   • Multiple renal cysts   • Pain of right scapula   • Has health insurance with inadequate coverage of health expenses   • Colonoscopy refused   • Diabetes mellitus type 2 in obese [facility])   • Encounter for diabetic foot exam ([facility] )   • Skin lesions   • Controlled type 2 diabetes mellitus with stage 3 chronic kidney disease, without long-term current use of insulin ([facility] )   • Bilateral lower extremity edema   • Wheezing   • Persistent proteinuria   • Medicare annual wellness visit, subsequent      Past Medical and Surgical History:     Past Medical History:   Diagnosis Date   • Elevated fasting glucose 8/8/2019   • Hypokalemia     Last Assessed: 8/12/2016    • Hyponatremia     Last Assessed: 8/12/2016    • Irregular heartbeat     A Flutter 7/2015 while in hospital for pneumonia, resolved    • Pneumonia    • Vitamin D deficiency 3/15/2022     History reviewed  No pertinent surgical history  Family History:     Family History   Problem Relation Age of Onset   • Colon cancer Mother    • Cancer Mother    • Tongue cancer Father    • Hypertension Father    • Cancer Father    • Other Sister         Epilepsy    • Hypertension Sister    • Colon cancer Brother    • Lung cancer Brother         secondary    • Hypertension Brother    • Cancer Brother       Social History:     Social History     Socioeconomic History   • Marital status: Single     Spouse name: None   • Number of children: None   • Years of education: None   • Highest education level: None   Occupational History   • None   Tobacco Use   • Smoking status: Never Smoker   • Smokeless tobacco: Never Used   Vaping Use   • Vaping Use: Never used   Substance and Sexual Activity   • Alcohol use: No   • Drug use: No   • Sexual activity: Not Currently   Other Topics Concern   • None   Social History Narrative   • None     Social Determinants of Health     Financial Resource Strain: Low Risk    • Difficulty of Paying Living Expenses: Not hard at all   Food Insecurity: Not on file   Transportation Needs: No Transportation Needs   • Lack of Transportation (Medical): No   • Lack of Transportation (Non-Medical):  No   Physical Activity: Not on file   Stress: Not on file   Social Connections: Not on file   Intimate Partner Violence: Not on file   Housing Stability: Not on file      Medications and Allergies:     Current Outpatient Medications   Medication Sig Dispense Refill   • amLODIPine (NORVASC) 5 mg tablet TAKE 1 TABLET BY MOUTH EVERY DAY 90 tablet 1   • aspirin 81 MG tablet Take 1 tablet by mouth daily     • Cholecalciferol (VITAMIN D3) 2000 units capsule Take 1 capsule by mouth daily     • CINNAMON PO Take 1 capsule by mouth daily     • Coenzyme Q-10 200 MG CAPS Take 1 capsule by mouth     • Cranberry 500 MG CAPS Take 1 capsule by mouth daily     • lisinopril-hydrochlorothiazide (PRINZIDE,ZESTORETIC) 20-25 MG per tablet Take 0 5 tablets by mouth every morning 90 tablet 1   • metoprolol tartrate (LOPRESSOR) 50 mg tablet TAKE 1 TABLET BY MOUTH TWICE A  tablet 1   • Multiple Vitamins-Minerals (DAILY MULTIPLE VITAMINS/MIN PO) Take 1 tablet by mouth daily     • Omega-3 Fatty Acids (FISH OIL) 1,000 mg Take 1 capsule by mouth daily     • Protein (NUTRA/PRO CHOCOLATE PO) Take by mouth       No current facility-administered medications for this visit  Allergies   Allergen Reactions   • Seasonal Ic  [Cholestatin]       Immunizations:     Immunization History   Administered Date(s) Administered   • COVID-19 MODERNA VACC 0 5 ML IM 04/22/2021, 05/24/2021, 12/01/2021   • Influenza Quadrivalent Preservative Free 3 years and older IM 11/03/2016, 12/14/2017   • Influenza, high dose seasonal 0 7 mL 11/20/2020   • Influenza, injectable, quadrivalent, preservative free 0 5 mL 01/24/2019   • Pneumococcal Conjugate 13-Valent 08/14/2020   • Pneumococcal Polysaccharide PPV23 09/02/2021   • Tdap 03/03/2016      Health Maintenance:         Topic Date Due   • Breast Cancer Screening: Mammogram  Never done   • Colorectal Cancer Screening  Never done   • Hepatitis C Screening  Completed         Topic Date Due   • COVID-19 Vaccine (4 - Booster for Dorice Marking series) 04/01/2022   • Influenza Vaccine (1) 09/01/2022      Medicare Screening Tests and Risk Assessments:     Franco Manzanares is here for her Subsequent Wellness visit   Last Medicare Wellness visit information reviewed, patient interviewed and updates made to the record today  Health Risk Assessment:   Patient rates overall health as good  Patient feels that their physical health rating is same  Patient is satisfied with their life  Eyesight was rated as same  Hearing was rated as same  Patient feels that their emotional and mental health rating is same  Patients states they are never, rarely angry  Patient states they are sometimes unusually tired/fatigued  Pain experienced in the last 7 days has been none  Patient states that she has experienced no weight loss or gain in last 6 months  Depression Screening:   PHQ-2 Score: 0      Fall Risk Screening: In the past year, patient has experienced: no history of falling in past year      Urinary Incontinence Screening:   Patient has not leaked urine accidently in the last six months  Home Safety:  Patient does not have trouble with stairs inside or outside of their home  Patient has working smoke alarms and has working carbon monoxide detector  Home safety hazards include: none  Nutrition:   Current diet is Regular  Medications:   Patient is currently taking over-the-counter supplements  OTC medications include: see medication list  Patient is able to manage medications  Activities of Daily Living (ADLs)/Instrumental Activities of Daily Living (IADLs):   Walk and transfer into and out of bed and chair?: Yes  Dress and groom yourself?: Yes    Bathe or shower yourself?: Yes    Feed yourself?  Yes  Do your laundry/housekeeping?: Yes  Manage your money, pay your bills and track your expenses?: Yes  Make your own meals?: Yes    Do your own shopping?: Yes    Previous Hospitalizations:   Any hospitalizations or ED visits within the last 12 months?: No      Advance Care Planning:   Living will: No    Advanced directive: No    Advanced directive counseling given: Yes    Five wishes given: Yes      Cognitive Screening:   Provider or family/friend/caregiver concerned regarding cognition?: No    PREVENTIVE SCREENINGS      Cardiovascular Screening:    General: Screening Current      Diabetes Screening:     General: Screening Not Indicated and History Diabetes      Colorectal Cancer Screening:     General: Risks and Benefits Discussed    Due for: FOBT/FIT      Breast Cancer Screening:     General: Risks and Benefits Discussed    Due for: Mammogram        Cervical Cancer Screening:    General: Screening Not Indicated      Osteoporosis Screening:    General: Risks and Benefits Discussed    Due for: DXA Axial      Abdominal Aortic Aneurysm (AAA) Screening:        General: Screening Not Indicated      Lung Cancer Screening:     General: Screening Not Indicated      Hepatitis C Screening:    General: Screening Current    Screening, Brief Intervention, and Referral to Treatment (SBIRT)    Screening  Typical number of drinks in a day: 0  Typical number of drinks in a week: 0  Interpretation: Low risk drinking behavior  Single Item Drug Screening:  How often have you used an illegal drug (including marijuana) or a prescription medication for non-medical reasons in the past year? never    Single Item Drug Screen Score: 0  Interpretation: Negative screen for possible drug use disorder    No exam data present     Physical Exam:     /84 (BP Location: Left arm, Patient Position: Sitting, Cuff Size: Large)   Pulse 94   Temp 98 9 °F (37 2 °C) (Tympanic)   Ht 5' 8" (1 727 m)   Wt 132 kg (292 lb)   SpO2 99%   BMI 44 40 kg/m²     Physical Exam  Constitutional:       General: She is not in acute distress  Appearance: She is not ill-appearing, toxic-appearing or diaphoretic  HENT:      Head: Normocephalic and atraumatic  Nose: Nose normal       Mouth/Throat:      Mouth: Mucous membranes are moist    Eyes:      Conjunctiva/sclera: Conjunctivae normal    Cardiovascular:      Rate and Rhythm: Normal rate and regular rhythm  Pulses: Normal pulses        Heart sounds: Normal heart sounds  No friction rub  No gallop  Pulmonary:      Effort: Pulmonary effort is normal       Breath sounds: Normal breath sounds and air entry  Abdominal:      Palpations: Abdomen is soft  Tenderness: There is no abdominal tenderness  Musculoskeletal:      Right lower leg: No edema  Left lower leg: No edema  Skin:     Coloration: Skin is not pale  Neurological:      General: No focal deficit present  Mental Status: She is alert and oriented to person, place, and time  Psychiatric:         Mood and Affect: Mood normal          Behavior: Behavior normal  Behavior is cooperative            Camille Jo DO

## 2022-11-02 DIAGNOSIS — I10 ESSENTIAL HYPERTENSION: ICD-10-CM

## 2022-11-02 RX ORDER — AMLODIPINE BESYLATE 5 MG/1
TABLET ORAL
Qty: 90 TABLET | Refills: 1 | Status: SHIPPED | OUTPATIENT
Start: 2022-11-02

## 2022-11-02 RX ORDER — METOPROLOL TARTRATE 50 MG/1
TABLET, FILM COATED ORAL
Qty: 180 TABLET | Refills: 1 | Status: SHIPPED | OUTPATIENT
Start: 2022-11-02

## 2022-11-14 ENCOUNTER — TELEPHONE (OUTPATIENT)
Dept: NEPHROLOGY | Facility: CLINIC | Age: 67
End: 2022-11-14

## 2022-11-18 ENCOUNTER — TELEPHONE (OUTPATIENT)
Dept: NEPHROLOGY | Facility: CLINIC | Age: 67
End: 2022-11-18

## 2022-11-18 DIAGNOSIS — N18.31 STAGE 3A CHRONIC KIDNEY DISEASE (HCC): Primary | ICD-10-CM

## 2022-11-20 NOTE — TELEPHONE ENCOUNTER
Requested medication(s) are due for refill today: Yes  Patient has already received a courtesy refill: No  Other reason request has been forwarded to provider: Refill protocol failed no

## 2022-12-05 ENCOUNTER — OFFICE VISIT (OUTPATIENT)
Dept: NEPHROLOGY | Facility: CLINIC | Age: 67
End: 2022-12-05

## 2022-12-05 VITALS
HEIGHT: 68 IN | WEIGHT: 293 LBS | DIASTOLIC BLOOD PRESSURE: 72 MMHG | BODY MASS INDEX: 44.41 KG/M2 | SYSTOLIC BLOOD PRESSURE: 118 MMHG | OXYGEN SATURATION: 99 % | HEART RATE: 56 BPM

## 2022-12-05 DIAGNOSIS — I10 ESSENTIAL HYPERTENSION: ICD-10-CM

## 2022-12-05 DIAGNOSIS — N18.32 STAGE 3B CHRONIC KIDNEY DISEASE (HCC): ICD-10-CM

## 2022-12-05 DIAGNOSIS — N18.30 CONTROLLED TYPE 2 DIABETES MELLITUS WITH STAGE 3 CHRONIC KIDNEY DISEASE, WITHOUT LONG-TERM CURRENT USE OF INSULIN (HCC): Primary | ICD-10-CM

## 2022-12-05 DIAGNOSIS — R80.1 PERSISTENT PROTEINURIA: ICD-10-CM

## 2022-12-05 DIAGNOSIS — E11.22 CONTROLLED TYPE 2 DIABETES MELLITUS WITH STAGE 3 CHRONIC KIDNEY DISEASE, WITHOUT LONG-TERM CURRENT USE OF INSULIN (HCC): Primary | ICD-10-CM

## 2022-12-05 NOTE — ASSESSMENT & PLAN NOTE
Lab Results   Component Value Date    HGBA1C 5 5 07/12/2022   Consider Ozempic for weight loss assistance   Defer to PCP

## 2022-12-05 NOTE — PROGRESS NOTES
Assessment & Plan:    1  Controlled type 2 diabetes mellitus with stage 3 chronic kidney disease, without long-term current use of insulin [facility])  Assessment & Plan:    Lab Results   Component Value Date    HGBA1C 5 5 07/12/2022   Consider Ozempic for weight loss assistance  Defer to PCP      2  Essential hypertension  Assessment & Plan:  Well controlled  Continue low-sodium diet and current antihypertensives  3  Stage 3b chronic kidney disease [facility])  Assessment & Plan:  Lab Results   Component Value Date    EGFR 42 09/14/2021    EGFR 48 10/13/2018    EGFR 55 11/11/2017    CREATININE 1 33 (H) 09/14/2021    CREATININE 1 20 10/13/2018    CREATININE 1 16 04/14/2018   Stable  Labs performed 12/01/2022 reveals a BUN of 30 mg/dL with a creatinine of 1 25 mg/dL estimated GFR 47 mL/min  Personally reviewed graph of renal function trends with patient  Patient is not on an ACE-I, ARB or SGLT-2 inhibitor  Patient was advised to avoid nephrotoxins  Continue management of healthy weight, blood glucose and blood pressures  Please renally dose medication for a creatinine clearance of 45 mL/min  Patient has not yet undergone Kidney Smart education  Continue to monitor renal function, anemia and bone-mineral parameters  Return to clinic in 6 months with physician with labs prior  Orders:  -     CBC and differential; Future; Expected date: 05/30/2023  -     Comprehensive metabolic panel; Future; Expected date: 05/30/2023  -     Magnesium; Future; Expected date: 05/30/2023  -     Microalbumin / creatinine urine ratio; Future; Expected date: 05/30/2023  -     Phosphorus; Future; Expected date: 05/30/2023  -     Protein / creatinine ratio, urine; Future; Expected date: 05/30/2023  -     PTH, intact; Future; Expected date: 05/30/2023  -     Urinalysis with microscopic; Future; Expected date: 05/30/2023    4  Persistent proteinuria  Assessment & Plan:  Continue lisinopril      Orders:  -     Microalbumin / creatinine urine ratio; Future; Expected date: 05/30/2023  -     Protein / creatinine ratio, urine; Future; Expected date: 05/30/2023       The benefits, risks and alternatives to the treatment plan were discussed at this visit  Patient was advised of common adverse effects of any medical therapies prescribed  All questions were answered and discussed with the patient and any accompanying family members or caretakers  Subjective:      Patient ID: Parag Mello is a 79 y o  female seen in the [place] office  HPI     Today, patient presents for routine follow-up without acute complaints relating to blood pressure, edema or nephrological concerns  Patient denies any changes in health, medication changes, hospitalizations, surgeries or trip to the emergency room, falls or bone fractures since last visit  Wants to start a diet  Blood pressure is 118/72 with a heart rate of 56  Patient does monitor blood pressures at home and reports 120s to 130s over 70s with HR 70s  Denies headaches, lightheadedness, dizziness  Patient reports adherence with antihypertensive regimen and denies adverse effects:  Amlodipine 5 mg daily, lisinopril-hydrochlorothiazide 20-25 mg half tablet daily, metoprolol 50 mg twice daily  Patient denies lower extremity swelling  Reviewed and discussed the results of labs performed 12/01/2022 reveals a BUN of 30 mg/dL with a creatinine of 1 25 mg/dL estimated GFR 47 mL/min  History is obtained from patient  The following portions of the patient's history were reviewed and updated as appropriate: allergies, current medications, past family history, past medical history, past social history, past surgical history, and problem list     Review of Systems   Constitutional: Negative for activity change, appetite change, chills, fatigue, fever and unexpected weight change  Respiratory: Negative for apnea, cough and shortness of breath      Cardiovascular: Negative for chest pain, palpitations and leg swelling  Gastrointestinal: Negative for abdominal pain, blood in stool, constipation, diarrhea, nausea and vomiting  Genitourinary: Negative for decreased urine volume, difficulty urinating, dysuria, flank pain, frequency, hematuria and urgency  Musculoskeletal: Negative for arthralgias, back pain, joint swelling and myalgias  Skin: Negative for color change and rash  Allergic/Immunologic: Negative for immunocompromised state  Neurological: Negative for dizziness, seizures, syncope, weakness, light-headedness, numbness and headaches  Hematological: Negative for adenopathy  Does not bruise/bleed easily  Psychiatric/Behavioral: Negative for agitation, behavioral problems, confusion, decreased concentration, dysphoric mood and hallucinations  The patient is not nervous/anxious and is not hyperactive  All other systems reviewed and are negative  Objective:      /72   Pulse 56   Ht 5' 8" (1 727 m)   Wt 134 kg (296 lb 3 2 oz)   SpO2 99%   BMI 45 04 kg/m²          Physical Exam  Vitals and nursing note reviewed  Exam conducted with a chaperone present  Constitutional:       General: She is not in acute distress  Appearance: Normal appearance  She is morbidly obese  She is not ill-appearing or toxic-appearing  HENT:      Head: Normocephalic and atraumatic  Nose: Nose normal  No congestion or rhinorrhea  Mouth/Throat:      Mouth: Mucous membranes are moist       Pharynx: Oropharynx is clear  Eyes:      General: No scleral icterus  Right eye: No discharge  Left eye: No discharge  Extraocular Movements: Extraocular movements intact  Conjunctiva/sclera: Conjunctivae normal       Pupils: Pupils are equal, round, and reactive to light  Cardiovascular:      Rate and Rhythm: Normal rate and regular rhythm  Pulses: Normal pulses  Heart sounds: Normal heart sounds  No murmur heard    Pulmonary:      Effort: Pulmonary effort is normal  No respiratory distress  Breath sounds: Normal breath sounds  No wheezing  Abdominal:      General: Abdomen is flat  Bowel sounds are normal  There is no distension  Palpations: Abdomen is soft  There is no mass  Tenderness: There is no abdominal tenderness  Musculoskeletal:         General: No swelling or deformity  Normal range of motion  Cervical back: Normal range of motion and neck supple  No rigidity or tenderness  Skin:     General: Skin is warm and dry  Coloration: Skin is not jaundiced or pale  Findings: No bruising  Neurological:      General: No focal deficit present  Mental Status: She is alert and oriented to person, place, and time  Mental status is at baseline  Psychiatric:         Mood and Affect: Mood normal          Behavior: Behavior normal          Thought Content:  Thought content normal          Judgment: Judgment normal              Lab Results   Component Value Date     04/14/2018    SODIUM 138 09/14/2021    K 4 3 09/14/2021     09/14/2021    CO2 27 09/14/2021    ANIONGAP 13 4 04/14/2018    AGAP 3 (L) 09/14/2021    BUN 38 (H) 09/14/2021    CREATININE 1 33 (H) 09/14/2021    GLUF 98 09/14/2021    CALCIUM 8 9 09/14/2021    AST 5 09/14/2021    ALT 24 09/14/2021    ALKPHOS 98 09/14/2021    PROT 7 2 04/14/2018    TP 7 3 09/14/2021    BILITOT 0 4 04/14/2018    TBILI 0 32 09/14/2021    EGFR 42 09/14/2021      Lab Results   Component Value Date    CREATININE 1 33 (H) 09/14/2021    CREATININE 1 20 10/13/2018    CREATININE 1 16 04/14/2018    CREATININE 1 09 11/11/2017    CREATININE 0 99 04/19/2017    CREATININE 1 32 (H) 07/27/2016      Lab Results   Component Value Date    COLORU Yellow 10/13/2018    CLARITYU Clear 10/13/2018    SPECGRAV 1 015 10/13/2018    PHUR 6 5 10/13/2018    LEUKOCYTESUR 3+ (A) 10/13/2018    NITRITE Negative 10/13/2018    PROTEIN UA Negative 10/13/2018    GLUCOSEU Negative 10/13/2018    KETONESU Negative 10/13/2018 UROBILINOGEN 0 2 10/13/2018    BILIRUBINUR Negative 10/13/2018    BLOODU Trace-Intact (A) 10/13/2018    RBCUA 0-5 10/13/2018    WBCUA 4-10 (A) 10/13/2018    EPIS Moderate (A) 10/13/2018    BACTERIA Occasional 10/13/2018      No results found for: LABPROT  No results found for: [facility] [employer] Results   Component Value Date    WBC 5 58 09/14/2021    HGB 11 8 09/14/2021    HCT 38 1 09/14/2021    MCV 93 09/14/2021     09/14/2021      Lab Results   Component Value Date    HGB 11 8 09/14/2021    HGB 12 6 10/13/2018    HGB 12 9 11/11/2017    HGB 12 3 04/19/2017    HGB 13 2 07/27/2016      No results found for: IRON, TIBC, FERRITIN   No results found for: PTHCALCIUM, [id number], PHOSPHORUS   Lab Results   Component Value Date    CHOLESTEROL 161 10/13/2018    HDL 51 10/13/2018    LDLCALC 92 10/13/2018    TRIG 88 10/13/2018      Lab Results   Component Value Date    URICACID 7 6 (H) 09/14/2021      Lab Results   Component Value Date    HGBA1C 5 5 07/12/2022      No results found for: TSHANTIBODY, U3QKCTB, FREET4   No results found for: KOFFI, DSDNAAB, RFIGM   Lab Results   Component Value Date    PROT 7 2 04/14/2018        Portions of the record may have been created with voice recognition software  Occasional wrong word or "sound a like" substitutions may have occurred due to the inherent limitations of voice recognition software  Read the chart carefully and recognize, using context, where substitutions have occurred  If you have any questions, please contact the dictating provider

## 2022-12-05 NOTE — ASSESSMENT & PLAN NOTE
Lab Results   Component Value Date    EGFR 42 09/14/2021    EGFR 48 10/13/2018    EGFR 55 11/11/2017    CREATININE 1 33 (H) 09/14/2021    CREATININE 1 20 10/13/2018    CREATININE 1 16 04/14/2018   Stable  Labs performed 12/01/2022 reveals a BUN of 30 mg/dL with a creatinine of 1 25 mg/dL estimated GFR 47 mL/min  Personally reviewed graph of renal function trends with patient  Patient is not on an ACE-I, ARB or SGLT-2 inhibitor  Patient was advised to avoid nephrotoxins  Continue management of healthy weight, blood glucose and blood pressures  Please renally dose medication for a creatinine clearance of 45 mL/min  Patient has not yet undergone Kidney Smart education  Continue to monitor renal function, anemia and bone-mineral parameters  Return to clinic in 6 months with physician with labs prior

## 2022-12-24 PROBLEM — Z00.00 MEDICARE ANNUAL WELLNESS VISIT, SUBSEQUENT: Status: RESOLVED | Noted: 2022-10-25 | Resolved: 2022-12-24

## 2023-02-01 LAB — HBA1C MFR BLD HPLC: 5.9 %

## 2023-03-08 ENCOUNTER — RA CDI HCC (OUTPATIENT)
Dept: OTHER | Facility: HOSPITAL | Age: 68
End: 2023-03-08

## 2023-03-08 NOTE — PROGRESS NOTES
[facility]  coding opportunities       Chart reviewed, no opportunity found:   [address]        Patients Insurance     Medicare Insurance: [employer]

## 2023-03-16 ENCOUNTER — OFFICE VISIT (OUTPATIENT)
Dept: FAMILY MEDICINE CLINIC | Facility: CLINIC | Age: 68
End: 2023-03-16

## 2023-03-16 VITALS
OXYGEN SATURATION: 99 % | DIASTOLIC BLOOD PRESSURE: 84 MMHG | TEMPERATURE: 98.9 F | HEART RATE: 82 BPM | HEIGHT: 68 IN | WEIGHT: 293 LBS | BODY MASS INDEX: 44.41 KG/M2 | SYSTOLIC BLOOD PRESSURE: 128 MMHG

## 2023-03-16 DIAGNOSIS — E66.01 MORBID OBESITY (HCC): ICD-10-CM

## 2023-03-16 DIAGNOSIS — E11.22 CONTROLLED TYPE 2 DIABETES MELLITUS WITH STAGE 3 CHRONIC KIDNEY DISEASE, WITHOUT LONG-TERM CURRENT USE OF INSULIN (HCC): ICD-10-CM

## 2023-03-16 DIAGNOSIS — I10 ESSENTIAL HYPERTENSION: ICD-10-CM

## 2023-03-16 DIAGNOSIS — N18.30 CONTROLLED TYPE 2 DIABETES MELLITUS WITH STAGE 3 CHRONIC KIDNEY DISEASE, WITHOUT LONG-TERM CURRENT USE OF INSULIN (HCC): ICD-10-CM

## 2023-03-16 DIAGNOSIS — E66.9 DIABETES MELLITUS TYPE 2 IN OBESE (HCC): Primary | ICD-10-CM

## 2023-03-16 DIAGNOSIS — Z13.220 LIPID SCREENING: ICD-10-CM

## 2023-03-16 DIAGNOSIS — E11.69 DIABETES MELLITUS TYPE 2 IN OBESE (HCC): Primary | ICD-10-CM

## 2023-03-16 DIAGNOSIS — I48.21 PERMANENT ATRIAL FIBRILLATION (HCC): ICD-10-CM

## 2023-03-16 NOTE — PROGRESS NOTES
Name: Juan David Vergara      : 1955      MRN: [id number]  Encounter Provider: Dae Peace DO  Encounter Date: 3/16/2023   Encounter department: [address] [place] [address]     1  Diabetes mellitus type 2 in obese [facility])  Comments:  ideally controlled, cpm    2  Controlled type 2 diabetes mellitus with stage 3 chronic kidney disease, without long-term current use of insulin ([facility] )    3  Essential hypertension  Comments:  controlled, cpm    4  Permanent atrial fibrillation ([facility] )  Comments:  last saw her cardiol 10/5/2020- last ekg - supposed to f/u with cardiol yearly; rate controlled, pt asymptomatic - will place order for cardiology f/u  Orders:  -     Ambulatory Referral to Cardiology; Future    5  Morbid obesity ([facility] )    6  Lipid screening  -     Lipid panel;  Future           Subjective      Chief Complaint   Patient presents with   • Follow-up     4 month       Scheduled f/u  States started nutrisystem again about 2 weeks ago, and was not walking during the winter- starting up again  BUN/Creat on recent labs shows prerenal 35/1 18 - pt reports has been better with water intake lately - pt sees nephrologist routinely - last was 2022  bp elevated on rooming 146/84 - normal on recheck  Chronic Afib- pt has not seen her cardiol since 10/2020 - denies any sx CP , palpitations, SOB, dizziness  Chronic mild LE edema unchanged    Review of Systems    Current Outpatient Medications on File Prior to Visit   Medication Sig   • amLODIPine (NORVASC) 5 mg tablet TAKE 1 TABLET BY MOUTH EVERY DAY   • aspirin 81 MG tablet Take 1 tablet by mouth daily   • Cholecalciferol (VITAMIN D3) 2000 units capsule Take 1 capsule by mouth daily   • CINNAMON PO Take 1 capsule by mouth daily   • Coenzyme Q-10 200 MG CAPS Take 1 capsule by mouth   • Cranberry 500 MG CAPS Take 1 capsule by mouth daily   • lisinopril-hydrochlorothiazide (PRINZIDE,ZESTORETIC) 20-25 MG per tablet Take 0 5 tablets by mouth every morning   • metoprolol tartrate (LOPRESSOR) 50 mg tablet TAKE 1 TABLET BY MOUTH TWICE A DAY   • Multiple Vitamins-Minerals (DAILY MULTIPLE VITAMINS/MIN PO) Take 1 tablet by mouth daily   • Omega-3 Fatty Acids (FISH OIL) 1,000 mg Take 1 capsule by mouth daily   • Protein (NUTRA/PRO CHOCOLATE PO) Take by mouth       Objective     /84 (BP Location: Left arm, Patient Position: Sitting, Cuff Size: Large)   Pulse 82   Temp 98 9 °F (37 2 °C) (Tympanic)   Ht 5' 8" (1 727 m)   Wt (!) 137 kg (302 lb)   SpO2 99%   BMI 45 92 kg/m²     Physical Exam  Vitals and nursing note reviewed  Constitutional:       General: She is not in acute distress  Appearance: She is well-developed  She is not ill-appearing, toxic-appearing or diaphoretic  HENT:      Head: Normocephalic and atraumatic  Mouth/Throat:      Pharynx: Uvula midline  Neck:      Trachea: Phonation normal    Cardiovascular:      Rate and Rhythm: Normal rate  Rhythm irregularly irregular  Pulses: Normal pulses  Heart sounds: S1 normal and S2 normal      No friction rub  No gallop  Pulmonary:      Effort: Pulmonary effort is normal       Breath sounds: Normal breath sounds and air entry  Musculoskeletal:      Right lower leg: No edema  Left lower leg: No edema  Skin:     General: Skin is warm and dry  Coloration: Skin is not pale  Neurological:      Mental Status: She is alert and oriented to person, place, and time  Psychiatric:         Mood and Affect: Mood normal          Behavior: Behavior normal  Behavior is cooperative         Madhavi Coronado DO

## 2023-04-06 ENCOUNTER — HOSPITAL ENCOUNTER (OUTPATIENT)
Dept: MAMMOGRAPHY | Facility: HOSPITAL | Age: 68
End: 2023-04-06

## 2023-04-06 ENCOUNTER — HOSPITAL ENCOUNTER (OUTPATIENT)
Dept: BONE DENSITY | Facility: HOSPITAL | Age: 68
End: 2023-04-06

## 2023-04-06 VITALS — BODY MASS INDEX: 44.41 KG/M2 | HEIGHT: 68 IN | WEIGHT: 293 LBS

## 2023-04-06 DIAGNOSIS — Z12.31 BREAST CANCER SCREENING BY MAMMOGRAM: ICD-10-CM

## 2023-04-06 DIAGNOSIS — Z78.0 POSTMENOPAUSAL: ICD-10-CM

## 2023-04-26 DIAGNOSIS — I10 ESSENTIAL HYPERTENSION: ICD-10-CM

## 2023-04-28 RX ORDER — LISINOPRIL AND HYDROCHLOROTHIAZIDE 25; 20 MG/1; MG/1
TABLET ORAL
Qty: 45 TABLET | Refills: 3 | Status: SHIPPED | OUTPATIENT
Start: 2023-04-28

## 2023-04-29 DIAGNOSIS — I10 ESSENTIAL HYPERTENSION: ICD-10-CM

## 2023-05-01 RX ORDER — AMLODIPINE BESYLATE 5 MG/1
TABLET ORAL
Qty: 90 TABLET | Refills: 1 | Status: SHIPPED | OUTPATIENT
Start: 2023-05-01

## 2023-05-01 RX ORDER — METOPROLOL TARTRATE 50 MG/1
TABLET, FILM COATED ORAL
Qty: 180 TABLET | Refills: 1 | Status: SHIPPED | OUTPATIENT
Start: 2023-05-01

## 2023-05-04 LAB
LEFT EYE DIABETIC RETINOPATHY: NORMAL
RIGHT EYE DIABETIC RETINOPATHY: NORMAL

## 2023-05-30 ENCOUNTER — TELEPHONE (OUTPATIENT)
Dept: NEPHROLOGY | Facility: CLINIC | Age: 68
End: 2023-05-30

## 2023-06-02 LAB — EXTERNAL EGFR: 40

## 2023-06-05 ENCOUNTER — OFFICE VISIT (OUTPATIENT)
Dept: NEPHROLOGY | Facility: CLINIC | Age: 68
End: 2023-06-05
Payer: COMMERCIAL

## 2023-06-05 VITALS
HEIGHT: 68 IN | SYSTOLIC BLOOD PRESSURE: 122 MMHG | BODY MASS INDEX: 44.41 KG/M2 | WEIGHT: 293 LBS | OXYGEN SATURATION: 96 % | HEART RATE: 78 BPM | DIASTOLIC BLOOD PRESSURE: 84 MMHG

## 2023-06-05 DIAGNOSIS — N18.30 CONTROLLED TYPE 2 DIABETES MELLITUS WITH STAGE 3 CHRONIC KIDNEY DISEASE, WITHOUT LONG-TERM CURRENT USE OF INSULIN (HCC): ICD-10-CM

## 2023-06-05 DIAGNOSIS — E66.01 MORBID OBESITY (HCC): ICD-10-CM

## 2023-06-05 DIAGNOSIS — N18.32 STAGE 3B CHRONIC KIDNEY DISEASE (HCC): Primary | ICD-10-CM

## 2023-06-05 DIAGNOSIS — E11.22 CONTROLLED TYPE 2 DIABETES MELLITUS WITH STAGE 3 CHRONIC KIDNEY DISEASE, WITHOUT LONG-TERM CURRENT USE OF INSULIN (HCC): ICD-10-CM

## 2023-06-05 DIAGNOSIS — I48.11 LONGSTANDING PERSISTENT ATRIAL FIBRILLATION (HCC): ICD-10-CM

## 2023-06-05 PROCEDURE — 99214 OFFICE O/P EST MOD 30 MIN: CPT | Performed by: INTERNAL MEDICINE

## 2023-06-05 NOTE — ASSESSMENT & PLAN NOTE
Lab Results   Component Value Date    CREATININE 1 33 (H) 09/14/2021    CREATININE 1 20 10/13/2018    CREATININE 1 16 04/14/2018    EGFR 42 09/14/2021    EGFR 48 10/13/2018    EGFR 55 11/11/2017   She had labs drawn at [facility] and unfortunately we do not have a chemistry  Last creatinine was 1 18 in February  We will repeat chemistry as well as microalbumin to creatinine ratio

## 2023-06-05 NOTE — ASSESSMENT & PLAN NOTE
She has good ratel control however she is not taking an anticoagulant which increases her risk for stroke    She does not want to take a blood thinner

## 2023-06-05 NOTE — PROGRESS NOTES
[address] [facility], [place]    Name: Yolis Herbert  YOB: 1955      Assessment/Plan:           Problem List Items Addressed This Visit        Endocrine    Controlled type 2 diabetes mellitus with stage 3 chronic kidney disease, without long-term current use of insulin ([facility] )       Lab Results   Component Value Date    HGBA1C 5 9 (H) 02/01/2023   Under your supervision  She might benefit from [address] for weight loss            Cardiovascular and Mediastinum    Atrial fibrillation ([facility] )     She has good ratel control however she is not taking an anticoagulant which increases her risk for stroke  She does not want to take a blood thinner            Genitourinary    Stage 3 chronic kidney disease ([facility] ) - Primary     Lab Results   Component Value Date    CREATININE 1 33 (H) 09/14/2021    CREATININE 1 20 10/13/2018    CREATININE 1 16 04/14/2018    EGFR 42 09/14/2021    EGFR 48 10/13/2018    EGFR 55 11/11/2017   She had labs drawn at [facility] and unfortunately we do not have a chemistry  Last creatinine was 1 18 in February  We will repeat chemistry as well as microalbumin to creatinine ratio  Relevant Orders    Comprehensive metabolic panel    Albumin / creatinine urine ratio    Urinalysis with microscopic       Other    Morbid obesity ([facility] )     She asked about Golo              Subjective:      Patient ID: Yolis Herbert is a 76 y o  female  Referred by Dr Warner Francis    HPI has a history of type 2 diabetes mellitus with stage III chronic kidney disease, essential hypertension, and persistent proteinuria  She admits to having gained weight    The following portions of the patient's history were reviewed and updated as appropriate: allergies, current medications, past family history, past medical history, past social history, past surgical history and problem list     Review of Systems   Constitutional: Positive for fatigue     HENT: Negative for hearing loss  Eyes: Negative for visual disturbance  Respiratory: Positive for shortness of breath  Cardiovascular: Positive for leg swelling  Negative for chest pain and palpitations  Has a  fib   Gastrointestinal: Negative for abdominal pain, constipation and diarrhea  Genitourinary: Positive for urgency  Negative for difficulty urinating, dysuria, frequency and hematuria  Musculoskeletal: Negative for arthralgias and back pain  Neurological: Positive for dizziness  Negative for weakness and headaches  Positional   Hematological: Bruises/bleeds easily  Psychiatric/Behavioral: Negative for dysphoric mood  Social History     Socioeconomic History   • Marital status: Single     Spouse name: None   • Number of children: None   • Years of education: None   • Highest education level: None   Occupational History   • None   Tobacco Use   • Smoking status: Never   • Smokeless tobacco: Never   Vaping Use   • Vaping Use: Never used   Substance and Sexual Activity   • Alcohol use: No   • Drug use: No   • Sexual activity: Not Currently   Other Topics Concern   • None   Social History Narrative   • None     Social Determinants of Health     Financial Resource Strain: Low Risk  (10/25/2022)    Overall Financial Resource Strain (CARDIA)    • Difficulty of Paying Living Expenses: Not hard at all   Food Insecurity: Not on file   Transportation Needs: No Transportation Needs (10/25/2022)    PRAPARE - Transportation    • Lack of Transportation (Medical): No    • Lack of Transportation (Non-Medical):  No   Physical Activity: Not on file   Stress: Not on file   Social Connections: Not on file   Intimate Partner Violence: Not on file   Housing Stability: Not on file     Past Medical History:   Diagnosis Date   • Elevated fasting glucose 8/8/2019   • Hypokalemia     Last Assessed: 8/12/2016    • Hyponatremia     Last Assessed: 8/12/2016    • Irregular heartbeat     A Flutter 7/2015 while in hospital for pneumonia, resolved    • Pneumonia    • Vitamin D deficiency 3/15/2022     History reviewed  No pertinent surgical history      Current Outpatient Medications:   •  amLODIPine (NORVASC) 5 mg tablet, TAKE 1 TABLET BY MOUTH EVERY DAY, Disp: 90 tablet, Rfl: 1  •  aspirin 81 MG tablet, Take 1 tablet by mouth daily, Disp: , Rfl:   •  Cholecalciferol (VITAMIN D3) 2000 units capsule, Take 1 capsule by mouth daily, Disp: , Rfl:   •  CINNAMON PO, Take 1 capsule by mouth daily, Disp: , Rfl:   •  Coenzyme Q-10 200 MG CAPS, Take 1 capsule by mouth, Disp: , Rfl:   •  Cranberry 500 MG CAPS, Take 1 capsule by mouth daily, Disp: , Rfl:   •  lisinopril-hydrochlorothiazide (PRINZIDE,ZESTORETIC) 20-25 MG per tablet, TAKE 1/2 TABLET BY MOUTH EVERY MORNING, Disp: 45 tablet, Rfl: 3  •  metoprolol tartrate (LOPRESSOR) 50 mg tablet, TAKE 1 TABLET BY MOUTH TWICE A DAY, Disp: 180 tablet, Rfl: 1  •  Multiple Vitamins-Minerals (DAILY MULTIPLE VITAMINS/MIN PO), Take 1 tablet by mouth daily, Disp: , Rfl:   •  Omega-3 Fatty Acids (FISH OIL) 1,000 mg, Take 1 capsule by mouth daily, Disp: , Rfl:   •  Protein (NUTRA/PRO CHOCOLATE PO), Take by mouth, Disp: , Rfl:     Lab Results   Component Value Date    AGAP 3 (L) 09/14/2021    ALKPHOS 98 09/14/2021    ALT 24 09/14/2021    ANIONGAP 13 4 04/14/2018    AST 5 09/14/2021    BILITOT 0 4 04/14/2018    BUN 38 (H) 09/14/2021    CALCIUM 8 9 09/14/2021     09/14/2021    CO2 27 09/14/2021    CREATININE 1 33 (H) 09/14/2021    EGFR 42 09/14/2021    GLUF 98 09/14/2021    K 4 3 09/14/2021     04/14/2018    PROT 7 2 04/14/2018    SODIUM 138 09/14/2021    TBILI 0 32 09/14/2021    TP 7 3 09/14/2021     Lab Results   Component Value Date    HCT 38 1 09/14/2021    HGB 11 8 09/14/2021    MCV 93 09/14/2021     09/14/2021    WBC 5 58 09/14/2021     Lab Results   Component Value Date    CHOLESTEROL 161 10/13/2018    CHOLESTEROL 182 04/19/2017     Lab Results   Component Value Date    HDL "51 10/13/2018    HDL 68 (H) 04/19/2017    HDL 47 07/27/2016     Lab Results   Component Value Date    LDLCALC 92 10/13/2018    LDLCALC 100 04/19/2017     Lab Results   Component Value Date    TRIG 88 10/13/2018    TRIG 68 04/19/2017    TRIG 104 07/27/2016     No results found for: \"CHOLHDL\"  Lab Results   Component Value Date    NWY9YCYDFYPF 3 460 04/19/2017     Lab Results   Component Value Date    CALCIUM 8 9 09/14/2021     No results found for: \"SPEP\", \"UPEP\"  No results found for: \"[id number]\", \"MICROALBUR\"  Labs 2/23 -  Creat 1 18 mg/dl    Objective:      /84 (BP Location: Left arm, Patient Position: Sitting, Cuff Size: Large)   Pulse 78   Ht 5' 8\" (1 727 m)   Wt (!) 138 kg (305 lb)   SpO2 96%   BMI 46 38 kg/m²          Physical Exam  Vitals reviewed  Constitutional:       General: She is not in acute distress  Appearance: She is obese  She is not toxic-appearing  HENT:      Head: Normocephalic and atraumatic  Right Ear: External ear normal       Left Ear: External ear normal    Eyes:      Extraocular Movements: Extraocular movements intact  Conjunctiva/sclera: Conjunctivae normal       Pupils: Pupils are equal, round, and reactive to light  Cardiovascular:      Rate and Rhythm: Rhythm irregular  Heart sounds: No murmur heard  Pulmonary:      Effort: Pulmonary effort is normal  No respiratory distress  Breath sounds: No wheezing or rales  Abdominal:      General: Bowel sounds are normal       Palpations: Abdomen is soft  Musculoskeletal:         General: Normal range of motion  Right lower leg: Edema present  Left lower leg: Edema present  Skin:     General: Skin is warm and dry  Neurological:      General: No focal deficit present  Mental Status: She is alert and oriented to person, place, and time  Mental status is at baseline  Psychiatric:         Mood and Affect: Mood normal          Behavior: Behavior normal          Thought Content:  Thought " content normal          Judgment: Judgment normal

## 2023-06-05 NOTE — ASSESSMENT & PLAN NOTE
Lab Results   Component Value Date    HGBA1C 5 9 (H) 02/01/2023   Under your supervision    She might benefit from [address] for weight loss

## 2023-07-06 ENCOUNTER — HOSPITAL ENCOUNTER (OUTPATIENT)
Dept: ULTRASOUND IMAGING | Facility: HOSPITAL | Age: 68
Discharge: HOME | End: 2023-07-06
Payer: COMMERCIAL

## 2023-07-06 ENCOUNTER — HOSPITAL ENCOUNTER (OUTPATIENT)
Dept: MAMMOGRAPHY | Facility: HOSPITAL | Age: 68
Discharge: HOME | End: 2023-07-06
Payer: COMMERCIAL

## 2023-07-06 VITALS — HEIGHT: 68 IN | BODY MASS INDEX: 44.41 KG/M2 | WEIGHT: 293 LBS

## 2023-07-06 DIAGNOSIS — R92.8 ABNORMAL MAMMOGRAM: ICD-10-CM

## 2023-07-06 PROCEDURE — 76642 ULTRASOUND BREAST LIMITED: CPT

## 2023-07-06 PROCEDURE — 77066 DX MAMMO INCL CAD BI: CPT

## 2023-07-06 PROCEDURE — G0279 TOMOSYNTHESIS, MAMMO: HCPCS

## 2023-10-26 ENCOUNTER — RA CDI HCC (OUTPATIENT)
Dept: OTHER | Facility: HOSPITAL | Age: 68
End: 2023-10-26

## 2023-10-26 NOTE — PROGRESS NOTES
[address] [facility] coding opportunities       Chart reviewed, no opportunity found: [address]        Patients Insurance     Medicare Insurance: [employer]

## 2023-10-27 DIAGNOSIS — I10 ESSENTIAL HYPERTENSION: ICD-10-CM

## 2023-10-27 NOTE — TELEPHONE ENCOUNTER
Patient is scheduled for AWV on 10/31/23    Requested medication(s) are due for refill today: Yes  Patient has already received a courtesy refill: No  Other reason request has been forwarded to provider: failed protocol    Cardiovascular:  Calcium Channel Blockers [id number]/27/2023 01:13 AM   Protocol Details Valid encounter within last 6 months    BP completed in the last 6 months        Name from pharmacy: METOPROLOL TARTRATE 50 MG TAB         Will file in chart as: metoprolol tartrate (LOPRESSOR) 50 mg tablet    Sig: TAKE 1 TABLET BY MOUTH TWICE A DAY    Disp: 180 tablet    Refills: 1    Start: 10/27/2023    Class: Normal    For: Essential hypertension    Last ordered: 5 months ago (5/1/2023) by Susana Carmichael DO    Last refill: 7/27/2023    Rx #: [id number]    Cardiovascular:  Beta Blockers [id number]/27/2023 01:13 AM   Protocol Details Valid encounter within last 6 months    BP completed in the last 6 months    Last Heart Rate in normal range and within 180 days

## 2023-10-29 RX ORDER — AMLODIPINE BESYLATE 5 MG/1
TABLET ORAL
Qty: 90 TABLET | Refills: 1 | Status: SHIPPED | OUTPATIENT
Start: 2023-10-29

## 2023-10-29 RX ORDER — METOPROLOL TARTRATE 50 MG/1
TABLET, FILM COATED ORAL
Qty: 180 TABLET | Refills: 1 | Status: SHIPPED | OUTPATIENT
Start: 2023-10-29

## 2023-10-31 ENCOUNTER — OFFICE VISIT (OUTPATIENT)
Dept: FAMILY MEDICINE CLINIC | Facility: CLINIC | Age: 68
End: 2023-10-31
Payer: COMMERCIAL

## 2023-10-31 VITALS
HEIGHT: 68 IN | TEMPERATURE: 98.4 F | SYSTOLIC BLOOD PRESSURE: 138 MMHG | DIASTOLIC BLOOD PRESSURE: 88 MMHG | WEIGHT: 293 LBS | BODY MASS INDEX: 44.41 KG/M2 | HEART RATE: 89 BPM | OXYGEN SATURATION: 100 %

## 2023-10-31 DIAGNOSIS — E11.69 DIABETES MELLITUS TYPE 2 IN OBESE: ICD-10-CM

## 2023-10-31 DIAGNOSIS — N18.30 CONTROLLED TYPE 2 DIABETES MELLITUS WITH STAGE 3 CHRONIC KIDNEY DISEASE, WITHOUT LONG-TERM CURRENT USE OF INSULIN (HCC): ICD-10-CM

## 2023-10-31 DIAGNOSIS — Z00.00 MEDICARE ANNUAL WELLNESS VISIT, SUBSEQUENT: Primary | ICD-10-CM

## 2023-10-31 DIAGNOSIS — I10 ESSENTIAL HYPERTENSION: ICD-10-CM

## 2023-10-31 DIAGNOSIS — E66.9 DIABETES MELLITUS TYPE 2 IN OBESE: ICD-10-CM

## 2023-10-31 DIAGNOSIS — E11.22 CONTROLLED TYPE 2 DIABETES MELLITUS WITH STAGE 3 CHRONIC KIDNEY DISEASE, WITHOUT LONG-TERM CURRENT USE OF INSULIN (HCC): ICD-10-CM

## 2023-10-31 DIAGNOSIS — Z12.11 SPECIAL SCREENING FOR MALIGNANT NEOPLASMS, COLON: ICD-10-CM

## 2023-10-31 DIAGNOSIS — I48.11 LONGSTANDING PERSISTENT ATRIAL FIBRILLATION (HCC): ICD-10-CM

## 2023-10-31 DIAGNOSIS — N18.32 STAGE 3B CHRONIC KIDNEY DISEASE (HCC): ICD-10-CM

## 2023-10-31 DIAGNOSIS — E11.9 DIET-CONTROLLED TYPE 2 DIABETES MELLITUS (HCC): ICD-10-CM

## 2023-10-31 DIAGNOSIS — N39.46 MIXED STRESS AND URGE URINARY INCONTINENCE: ICD-10-CM

## 2023-10-31 DIAGNOSIS — Z23 ENCOUNTER FOR IMMUNIZATION: ICD-10-CM

## 2023-10-31 DIAGNOSIS — E66.01 MORBID OBESITY (HCC): ICD-10-CM

## 2023-10-31 DIAGNOSIS — E11.9 ENCOUNTER FOR DIABETIC FOOT EXAM (HCC): ICD-10-CM

## 2023-10-31 DIAGNOSIS — Z59.9 FINANCIAL DIFFICULTIES: ICD-10-CM

## 2023-10-31 LAB — SL AMB POCT HEMOGLOBIN AIC: 5.4 (ref ?–6.5)

## 2023-10-31 PROCEDURE — 83036 HEMOGLOBIN GLYCOSYLATED A1C: CPT | Performed by: FAMILY MEDICINE

## 2023-10-31 PROCEDURE — G0439 PPPS, SUBSEQ VISIT: HCPCS | Performed by: FAMILY MEDICINE

## 2023-10-31 PROCEDURE — 99214 OFFICE O/P EST MOD 30 MIN: CPT | Performed by: FAMILY MEDICINE

## 2023-10-31 PROCEDURE — G0008 ADMIN INFLUENZA VIRUS VAC: HCPCS

## 2023-10-31 PROCEDURE — 90662 IIV NO PRSV INCREASED AG IM: CPT

## 2023-10-31 SDOH — ECONOMIC STABILITY - INCOME SECURITY: PROBLEM RELATED TO HOUSING AND ECONOMIC CIRCUMSTANCES, UNSPECIFIED: Z59.9

## 2023-10-31 NOTE — PATIENT INSTRUCTIONS
Check insurance coverage of shingles vaccine  Medicare Preventive Visit Patient Instructions  Thank you for completing your Welcome to Medicare Visit or Medicare Annual Wellness Visit today. Your next wellness visit will be due in one year (10/31/2024). The screening/preventive services that you may require over the next 5-10 years are detailed below. Some tests may not apply to you based off risk factors and/or age. Screening tests ordered at today's visit but not completed yet may show as past due. Also, please note that scanned in results may not display below. Preventive Screenings:  Service Recommendations Previous Testing/Comments   Colorectal Cancer Screening  * Colonoscopy    * Fecal Occult Blood Test (FOBT)/Fecal Immunochemical Test (FIT)  * Fecal DNA/Cologuard Test  * Flexible Sigmoidoscopy Age: 43-73 years old   Colonoscopy: every 10 years (may be performed more frequently if at higher risk)  OR  FOBT/FIT: every 1 year  OR  Cologuard: every 3 years  OR  Sigmoidoscopy: every 5 years  Screening may be recommended earlier than age 39 if at higher risk for colorectal cancer. Also, an individualized decision between you and your healthcare provider will decide whether screening between the ages of 77-80 would be appropriate. Colonoscopy: Not on file  FOBT/FIT: Not on file  Cologuard: Not on file  Sigmoidoscopy: Not on file    Risks and Benefits Discussed  Due for Cologuard     Breast Cancer Screening Age: 36 years old  Frequency: every 1-2 years  Not required if history of left and right mastectomy Mammogram: 07/06/2023    Screening Current   Cervical Cancer Screening Between the ages of 21-29, pap smear recommended once every 3 years. Between the ages of 32-69, can perform pap smear with HPV co-testing every 5 years.    Recommendations may differ for women with a history of total hysterectomy, cervical cancer, or abnormal pap smears in past. Pap Smear: Not on file    Screening Not Indicated   Hepatitis C Screening Once for adults born between 1945 and 1965  More frequently in patients at high risk for Hepatitis C Hep C Antibody: 10/31/2020    Screening Current   Diabetes Screening 1-2 times per year if you're at risk for diabetes or have pre-diabetes Fasting glucose: 98 mg/dL (9/14/2021)  A1C: 5.4 (10/31/2023)  Screening Not Indicated  History Diabetes   Cholesterol Screening Once every 5 years if you don't have a lipid disorder. May order more often based on risk factors. Lipid panel: 06/02/2023    Screening Current     Other Preventive Screenings Covered by Medicare:  Abdominal Aortic Aneurysm (AAA) Screening: covered once if your at risk. You're considered to be at risk if you have a family history of AAA. Lung Cancer Screening: covers low dose CT scan once per year if you meet all of the following conditions: (1) Age 48-67; (2) No signs or symptoms of lung cancer; (3) Current smoker or have quit smoking within the last 15 years; (4) You have a tobacco smoking history of at least 20 pack years (packs per day multiplied by number of years you smoked); (5) You get a written order from a healthcare provider. Glaucoma Screening: covered annually if you're considered high risk: (1) You have diabetes OR (2) Family history of glaucoma OR (3)  aged 48 and older OR (3)  American aged 72 and older  Osteoporosis Screening: covered every 2 years if you meet one of the following conditions: (1) You're estrogen deficient and at risk for osteoporosis based off medical history and other findings; (2) Have a vertebral abnormality; (3) On glucocorticoid therapy for more than 3 months; (4) Have primary hyperparathyroidism; (5) On osteoporosis medications and need to assess response to drug therapy. Last bone density test (DXA Scan): 04/06/2023. HIV Screening: covered annually if you're between the age of 14-79.  Also covered annually if you are younger than 13 and older than 72 with risk factors for HIV infection. For pregnant patients, it is covered up to 3 times per pregnancy. Immunizations:  Immunization Recommendations   Influenza Vaccine Annual influenza vaccination during flu season is recommended for all persons aged >= 6 months who do not have contraindications   Pneumococcal Vaccine   * Pneumococcal conjugate vaccine = PCV13 (Prevnar 13), PCV15 (Vaxneuvance), PCV20 (Prevnar 20)  * Pneumococcal polysaccharide vaccine = PPSV23 (Pneumovax) Adults 73-81 yo with certain risk factors or if 69+ yo  If never received any pneumonia vaccine: recommend Prevnar 20 (PCV20)  Give PCV20 if previously received 1 dose of PCV13 or PPSV23   Hepatitis B Vaccine 3 dose series if at intermediate or high risk (ex: diabetes, end stage renal disease, liver disease)   Respiratory syncytial virus (RSV) Vaccine - COVERED BY MEDICARE PART D  * RSVPreF3 (Arexvy) CDC recommends that adults 61years of age and older may receive a single dose of RSV vaccine using shared clinical decision-making (SCDM)   Tetanus (Td) Vaccine - COST NOT COVERED BY MEDICARE PART B Following completion of primary series, a booster dose should be given every 10 years to maintain immunity against tetanus. Td may also be given as tetanus wound prophylaxis. Tdap Vaccine - COST NOT COVERED BY MEDICARE PART B Recommended at least once for all adults. For pregnant patients, recommended with each pregnancy. Shingles Vaccine (Shingrix) - COST NOT COVERED BY MEDICARE PART B  2 shot series recommended in those 19 years and older who have or will have weakened immune systems or those 50 years and older     Health Maintenance Due:      Topic Date Due   • Colorectal Cancer Screening  Never done   • Breast Cancer Screening: Mammogram  07/06/2024   • Hepatitis C Screening  Completed     Immunizations Due:      Topic Date Due   • COVID-19 Vaccine (4 - Moderna series) 01/26/2022   • Influenza Vaccine (1) 09/01/2023     Advance Directives   What are advance directives? Advance directives are legal documents that state your wishes and plans for medical care. These plans are made ahead of time in case you lose your ability to make decisions for yourself. Advance directives can apply to any medical decision, such as the treatments you want, and if you want to donate organs. What are the types of advance directives? There are many types of advance directives, and each state has rules about how to use them. You may choose a combination of any of the following:  Living will: This is a written record of the treatment you want. You can also choose which treatments you do not want, which to limit, and which to stop at a certain time. This includes surgery, medicine, IV fluid, and tube feedings. Durable power of  for [facility]): This is a written record that states who you want to make healthcare choices for you when you are unable to make them for yourself. This person, called a proxy, is usually a family member or a friend. You may choose more than 1 proxy. Do not resuscitate (DNR) order:  A DNR order is used in case your heart stops beating or you stop breathing. It is a request not to have certain forms of treatment, such as CPR. A DNR order may be included in other types of advance directives. Medical directive: This covers the care that you want if you are in a coma, near death, or unable to make decisions for yourself. You can list the treatments you want for each condition. Treatment may include pain medicine, surgery, blood transfusions, dialysis, IV or tube feedings, and a ventilator (breathing machine). Values history: This document has questions about your views, beliefs, and how you feel and think about life. This information can help others choose the care that you would choose. Why are advance directives important? An advance directive helps you control your care. Although spoken wishes may be used, it is better to have your wishes written down. Spoken wishes can be misunderstood, or not followed. Treatments may be given even if you do not want them. An advance directive may make it easier for your family to make difficult choices about your care. Urinary Incontinence   Urinary incontinence (UI)  is when you lose control of your bladder. UI develops because your bladder cannot store or empty urine properly. The 3 most common types of UI are stress incontinence, urge incontinence, or both. Medicines:   May be given to help strengthen your bladder control. Report any side effects of medication to your healthcare provider. Do pelvic muscle exercises often:  Your pelvic muscles help you stop urinating. Squeeze these muscles tight for 5 seconds, then relax for 5 seconds. Gradually work up to squeezing for 10 seconds. Do 3 sets of 15 repetitions a day, or as directed. This will help strengthen your pelvic muscles and improve bladder control. Train your bladder:  Go to the bathroom at set times, such as every 2 hours, even if you do not feel the urge to go. You can also try to hold your urine when you feel the urge to go. For example, hold your urine for 5 minutes when you feel the urge to go. As that becomes easier, hold your urine for 10 minutes. Self-care:   Keep a UI record. Write down how often you leak urine and how much you leak. Make a note of what you were doing when you leaked urine. Drink liquids as directed. You may need to limit the amount of liquid you drink to help control your urine leakage. Do not drink any liquid right before you go to bed. Limit or do not have drinks that contain caffeine or alcohol. Prevent constipation. Eat a variety of high-fiber foods. Good examples are high-fiber cereals, beans, vegetables, and whole-grain breads. Walking is the best way to trigger your intestines to have a bowel movement. Exercise regularly and maintain a healthy weight.   Weight loss and exercise will decrease pressure on your bladder and help you control your leakage. Use a catheter as directed  to help empty your bladder. A catheter is a tiny, plastic tube that is put into your bladder to drain your urine. Go to behavior therapy as directed. Behavior therapy may be used to help you learn to control your urge to urinate. Weight Management   Why it is important to manage your weight:  Being overweight increases your risk of health conditions such as heart disease, high blood pressure, type 2 diabetes, and certain types of cancer. It can also increase your risk for osteoarthritis, sleep apnea, and other respiratory problems. Aim for a slow, steady weight loss. Even a small amount of weight loss can lower your risk of health problems. How to lose weight safely:  A safe and healthy way to lose weight is to eat fewer calories and get regular exercise. You can lose up about 1 pound a week by decreasing the number of calories you eat by 500 calories each day. Healthy meal plan for weight management:  A healthy meal plan includes a variety of foods, contains fewer calories, and helps you stay healthy. A healthy meal plan includes the following:  Eat whole-grain foods more often. A healthy meal plan should contain fiber. Fiber is the part of grains, fruits, and vegetables that is not broken down by your body. Whole-grain foods are healthy and provide extra fiber in your diet. Some examples of whole-grain foods are whole-wheat breads and pastas, oatmeal, brown rice, and bulgur. Eat a variety of vegetables every day. Include dark, leafy greens such as spinach, kale, anastasia greens, and mustard greens. Eat yellow and orange vegetables such as carrots, sweet potatoes, and winter squash. Eat a variety of fruits every day. Choose fresh or canned fruit (canned in its own juice or light syrup) instead of juice. Fruit juice has very little or no fiber. Eat low-fat dairy foods. Drink fat-free (skim) milk or 1% milk.  Eat fat-free yogurt and low-fat cottage cheese. Try low-fat cheeses such as mozzarella and other reduced-fat cheeses. Choose meat and other protein foods that are low in fat. Choose beans or other legumes such as split peas or lentils. Choose fish, skinless poultry (chicken or turkey), or lean cuts of red meat (beef or pork). Before you cook meat or poultry, cut off any visible fat. Use less fat and oil. Try baking foods instead of frying them. Add less fat, such as margarine, sour cream, regular salad dressing and mayonnaise to foods. Eat fewer high-fat foods. Some examples of high-fat foods include french fries, doughnuts, ice cream, and cakes. Eat fewer sweets. Limit foods and drinks that are high in sugar. This includes candy, cookies, regular soda, and sweetened drinks. Exercise:  Exercise at least 30 minutes per day on most days of the week. Some examples of exercise include walking, biking, dancing, and swimming. You can also fit in more physical activity by taking the stairs instead of the elevator or parking farther away from stores. Ask your healthcare provider about the best exercise plan for you. © Copyright [employer] 2018 Information is for End User's use only and may not be sold, redistributed or otherwise used for commercial purposes. All illustrations and images included in CareNotes® are the copyrighted property of [employer] or [address] [place] [address]      Urinary Incontinence   AMBULATORY CARE:   Urinary incontinence (UI)  is loss of bladder control. UI develops because your bladder cannot store or empty urine properly. The 3 most common types of UI are stress incontinence, urge incontinence, or both. Common symptoms include the following: You feel like your bladder does not empty completely when you urinate. You urinate often and need to urinate immediately. You leak urine when you sleep, or you wake up with the urge to urinate. You leak urine when you cough, sneeze, exercise, or laugh.     Seek care immediately if:   You have severe pain. You are confused or cannot think clearly. You see blood in your urine. You have pain when you urinate. You have new or worse pain, even after treatment. Call your doctor if:   You have a fever. Your mouth feels dry or you have vision changes. Your urine is cloudy or smells bad. You have questions or concerns about your condition or care. Treatment  may include any of the following:  Medicines  may be given to help strengthen your bladder control. Electrical stimulation  is used to send a small amount of electrical energy to your pelvic floor muscles. This helps control your bladder function. Electrodes may be placed outside your body or in your rectum. For women, the electrodes may be placed in the vagina. A bulking agent  may be injected into the wall of your urethra to make it thicker. This helps keep your urethra closed and decreases urine leakage. Devices  such as a clamp, pessary, or tampon may help stop urine leaks. Ask your healthcare provider for more information about these and other devices. Surgery  may be needed if other treatments do not work. Several types of surgery can help improve your bladder control. Ask your provider for more information about the surgery you may need. Self-care:   Keep a UI record. Write down how often you leak urine and how much you leak. Make a note of what you were doing when you leaked urine. Drink liquids as directed. Ask your healthcare provider how much liquid to drink each day and which liquids are best for you. You may need to limit the amount of liquid you drink to help control your urine leakage. Do not drink any liquid right before you go to bed. Limit or do not have drinks that contain caffeine or alcohol. Prevent constipation. Eat a variety of high-fiber foods. Good examples are high-fiber cereals, beans, vegetables, and whole-grain breads.  Prune juice may help make your bowel movement softer. Walking is the best way to trigger your intestines to have a bowel movement. Exercise regularly and maintain a healthy weight. Ask your provider how much you should weigh and about the best exercise plan for you. Weight loss and exercise will decrease pressure on your bladder and help you control your leakage. Ask your provider to help you create a weight loss plan, if needed. Use a catheter as directed  to help empty your bladder. A catheter is a tiny, plastic tube that is put into your bladder to drain your urine. Your provider may tell you to use a catheter to prevent your bladder from getting too full and leaking urine. Go to behavior therapy as directed. Behavior therapy may be used to help you learn to control your urge to urinate. Follow up with your doctor as directed:  Write down your questions so you remember to ask them during your visits. © Copyright [employer] 2023 Information is for End User's use only and may not be sold, redistributed or otherwise used for commercial purposes. The above information is an  only. It is not intended as medical advice for individual conditions or treatments. Talk to your doctor, nurse or pharmacist before following any medical regimen to see if it is safe and effective for you. Kegel Exercises for Women   AMBULATORY CARE:   Kegel exercises  help strengthen your pelvic muscles. Pelvic muscles hold your pelvic organs, such as your bladder and uterus, in place. Kegel exercises help prevent or control certain conditions, such as urine incontinence (leakage) or uterine prolapse. Call your doctor or physical therapist if:   You cannot feel your muscles tighten or relax. You continue to leak urine. You have questions or concerns about your condition or care. Use the correct muscles:  Pelvic muscles are the muscles you use to control urine flow.  To target these muscles, stop and start the flow of urine several times. This will help you become familiar with how it feels to tighten and relax these muscles. How to do Kegel exercises:   Get into a comfortable position. You may lie down, stand up, or sit down to do these exercises. When you first try to do these exercises, it may be easier if you lie down. Tighten or squeeze your pelvic muscles slowly. It may feel like you are trying to hold back urine or gas. Hold this position for 3 seconds. Relax for 3 seconds. Repeat this cycle 10 times. Do not hold your breath when you do Kegel exercises. Keep your stomach, back, and leg muscles relaxed. Do 10 sets of Kegel exercises, at least 3 times a day. When you know how to do Kegel exercises, use different positions. This will help to strengthen your pelvic muscles as much as possible. You can do these exercises while you lie on the floor, watch TV, or while you stand. Tighten your pelvic muscles before you sneeze, cough, or lift to prevent urine leakage. You may notice improved bladder control within about 6 weeks. Follow up with your doctor or physical therapist as directed:  Write down your questions so you remember to ask them during your visits. © Copyright [employer] 2023 Information is for End User's use only and may not be sold, redistributed or otherwise used for commercial purposes. The above information is an  only. It is not intended as medical advice for individual conditions or treatments. Talk to your doctor, nurse or pharmacist before following any medical regimen to see if it is safe and effective for you.

## 2023-10-31 NOTE — PROGRESS NOTES
Diabetic Foot Exam    Patient's shoes and socks removed. Right Foot/Ankle   Right Foot Inspection  Skin Exam: skin normal, skin intact and dry skin. No warmth, no callus, no erythema, no maceration, no abnormal color, no pre-ulcer, no ulcer and no callus. Toe Exam: ROM and strength within normal limits. Sensory   Monofilament testing: intact    Vascular  The right DP pulse is 2+. The right PT pulse is 2+. Left Foot/Ankle  Left Foot Inspection  Skin Exam: skin normal, skin intact and dry skin. No warmth, no erythema, no maceration, normal color, no pre-ulcer, no ulcer and no callus. Toe Exam: ROM and strength within normal limits. Sensory   Monofilament testing: intact    Vascular  The left DP pulse is 2+. The left PT pulse is 2+. Assign Risk Category  No deformity present  No loss of protective sensation  No weak pulses  Risk: 0   Assessment and Plan:     Problem List Items Addressed This Visit          Endocrine    Diet-controlled type 2 diabetes mellitus ([address])    Diabetes mellitus type 2 in obese     Relevant Orders    POCT hemoglobin A1c (Completed)    Controlled type 2 diabetes mellitus with stage 3 chronic kidney disease, without long-term current use of insulin ([address])       Cardiovascular and Mediastinum    Essential hypertension    Atrial fibrillation (HCC)       Genitourinary    Stage 3 chronic kidney disease ([address])       Other    Morbid obesity ([address])    Medicare annual wellness visit, subsequent - Primary    Encounter for diabetic foot exam ([address])     Other Visit Diagnoses       Encounter for immunization        Special screening for malignant neoplasms, colon        Financial difficulties        Relevant Orders    Ambulatory referral to social work care management program    Mixed stress and urge urinary incontinence              BMI Counseling: Body mass index is 45.71 kg/m². The BMI is above normal. Nutrition recommendations include decreasing portion sizes.  Exercise recommendations include exercising 3-5 times per week. Rationale for BMI follow-up plan is due to patient being overweight or obese. Depression Screening and Follow-up Plan: Patient was screened for depression during today's encounter. They screened negative with a PHQ-2 score of 0. Urinary Incontinence Plan of Care: counseling topics discussed: practice Kegel (pelvic floor strengthening) exercises and use restroom every 2 hours. Preventive health issues were discussed with patient, and age appropriate screening tests were ordered as noted in patient's After Visit Summary. Personalized health advice and appropriate referrals for health education or preventive services given if needed, as noted in patient's After Visit Summary. History of Present Illness:     Patient presents for a Medicare Wellness Visit +f/u    Scheduled Medicare AWV + f/u  Doing well, no interval acute problems  States she did get a colon cancer screening kit from her insurance company "but I don't know if that one is good now"  Sees cardiologist routinely-   Re: her Afib, she has good rate control, but she does not want to take a rx blood thinner, so is at increased risk for stroke. She is only taking baby ASA as an anticoagulant     Patient Care Team:  Marycarmen Padilla DO as PCP - [address] [place] [address], DO  Mary Cannon PA-C as Physician Assistant (Nephrology)     Review of Systems:     Review of Systems   Constitutional: Negative. HENT:  Negative for nosebleeds. Respiratory: Negative. Cardiovascular: Negative. Gastrointestinal: Negative. Neurological:         Slight vertigo once in awhile when she lays down   Hematological: Negative. All other systems reviewed and are negative.        Problem List:     Patient Active Problem List   Diagnosis   • Atrial fibrillation [facility])   • Stage 3 chronic kidney disease ([address] )   • Essential hypertension   • Morbid obesity ([address])   • Multiple renal cysts • Pain of right scapula   • Has health insurance with inadequate coverage of health expenses   • Colonoscopy refused   • Diabetes mellitus type 2 in obese    • Encounter for diabetic foot exam ([address])   • Skin lesions   • Controlled type 2 diabetes mellitus with stage 3 chronic kidney disease, without long-term current use of insulin (HCC)   • Bilateral lower extremity edema   • Wheezing   • Persistent proteinuria   • Medicare annual wellness visit, subsequent   • Diet-controlled type 2 diabetes mellitus ([address])      Past Medical and Surgical History:     Past Medical History:   Diagnosis Date   • Elevated fasting glucose 8/8/2019   • Hypokalemia     Last Assessed: 8/12/2016    • Hyponatremia     Last Assessed: 8/12/2016    • Irregular heartbeat     A Flutter 7/2015 while in hospital for pneumonia, resolved    • Pneumonia    • Vitamin D deficiency 3/15/2022     History reviewed. No pertinent surgical history.    Family History:     Family History   Problem Relation Age of Onset   • Colon cancer Mother    • Tongue cancer Father    • Hypertension Father    • Other Sister         Epilepsy    • Hypertension Sister    • No Known Problems Sister    • No Known Problems Maternal Grandmother    • No Known Problems Maternal Grandfather    • Breast cancer Paternal Grandmother 80   • Colon cancer Brother    • Lung cancer Brother         secondary    • Hypertension Brother    • Breast cancer Maternal Aunt    • No Known Problems Maternal Aunt    • No Known Problems Paternal Aunt    • Cancer Neg Hx    • BRCA2 Positive Neg Hx    • BRCA2 Negative Neg Hx    • BRCA1 Positive Neg Hx    • BRCA1 Negative Neg Hx    • Ovarian cancer Neg Hx    • BRCA 1/2 Neg Hx    • Endometrial cancer Neg Hx    • Breast cancer additional onset Neg Hx       Social History:     Social History     Socioeconomic History   • Marital status: Single     Spouse name: None   • Number of children: None   • Years of education: None   • Highest education level: None Occupational History   • None   Tobacco Use   • Smoking status: Never   • Smokeless tobacco: Never   Vaping Use   • Vaping Use: Never used   Substance and Sexual Activity   • Alcohol use: No   • Drug use: No   • Sexual activity: Not Currently   Other Topics Concern   • None   Social History Narrative   • None     Social Determinants of Health     Financial Resource Strain: Medium Risk (10/31/2023)    Overall Financial Resource Strain (CARDIA)    • Difficulty of Paying Living Expenses: Somewhat hard   Food Insecurity: Not on file   Transportation Needs: No Transportation Needs (10/31/2023)    PRAPARE - Transportation    • Lack of Transportation (Medical): No    • Lack of Transportation (Non-Medical): No   Physical Activity: Not on file   Stress: Not on file   Social Connections: Not on file   Intimate Partner Violence: Not on file   Housing Stability: Not on file      Medications and Allergies:     Current Outpatient Medications   Medication Sig Dispense Refill   • amLODIPine (NORVASC) 5 mg tablet TAKE 1 TABLET BY MOUTH EVERY DAY 90 tablet 1   • aspirin 81 MG tablet Take 1 tablet by mouth daily     • Cholecalciferol (VITAMIN D3) 2000 units capsule Take 1 capsule by mouth daily     • CINNAMON PO Take 1 capsule by mouth daily     • Coenzyme Q-10 200 MG CAPS Take 1 capsule by mouth     • Cranberry 500 MG CAPS Take 1 capsule by mouth daily     • lisinopril-hydrochlorothiazide (PRINZIDE,ZESTORETIC) 20-25 MG per tablet TAKE 1/2 TABLET BY MOUTH EVERY MORNING 45 tablet 3   • metoprolol tartrate (LOPRESSOR) 50 mg tablet TAKE 1 TABLET BY MOUTH TWICE A  tablet 1   • Multiple Vitamins-Minerals (DAILY MULTIPLE VITAMINS/MIN PO) Take 1 tablet by mouth daily     • Omega-3 Fatty Acids (FISH OIL) 1,000 mg Take 1 capsule by mouth daily     • Protein (NUTRA/PRO CHOCOLATE PO) Take by mouth       No current facility-administered medications for this visit.      Allergies   Allergen Reactions   • Seasonal Ic  [Cholestatin] Immunizations:     Immunization History   Administered Date(s) Administered   • COVID-19 MODERNA VACC 0.5 ML IM 04/22/2021, 05/24/2021, 12/01/2021   • INFLUENZA 10/25/2022   • Influenza Quadrivalent Preservative Free 3 years and older IM 11/03/2016, 12/14/2017   • Influenza, high dose seasonal 0.7 mL 11/20/2020, 10/25/2022   • Influenza, injectable, quadrivalent, preservative free 0.5 mL 01/24/2019   • Pneumococcal Conjugate 13-Valent 08/14/2020   • Pneumococcal Polysaccharide PPV23 09/02/2021   • Tdap 03/03/2016      Health Maintenance:         Topic Date Due   • Colorectal Cancer Screening  Never done   • Breast Cancer Screening: Mammogram  07/06/2024   • Hepatitis C Screening  Completed         Topic Date Due   • COVID-19 Vaccine (4 - [employer] [place] series) 01/26/2022   • Influenza Vaccine (1) 09/01/2023      Medicare Screening Tests and Risk Assessments:     Vanesa Rodriguez is here for her Subsequent Wellness visit. Last Medicare Wellness visit information reviewed, patient interviewed and updates made to the record today. Health Risk Assessment:   Patient rates overall health as good. Patient feels that their physical health rating is same. Patient is satisfied with their life. Eyesight was rated as same. Hearing was rated as same. Patient feels that their emotional and mental health rating is same. Patients states they are never, rarely angry. Patient states they are sometimes unusually tired/fatigued. Pain experienced in the last 7 days has been some. Patient's pain rating has been 2/10. Patient states that she has experienced no weight loss or gain in last 6 months. Depression Screening:   PHQ-2 Score: 0      Fall Risk Screening: In the past year, patient has experienced: no history of falling in past year      Urinary Incontinence Screening:   Patient has leaked urine accidently in the last six months.  Urge and stress incontinence    Home Safety:  Patient does not have trouble with stairs inside or outside of their home. Patient has working smoke alarms and has working carbon monoxide detector. Home safety hazards include: none. Nutrition:   Current diet is Regular. Medications:   Patient is currently taking over-the-counter supplements. OTC medications include: see medication list. Patient is able to manage medications. Activities of Daily Living (ADLs)/Instrumental Activities of Daily Living (IADLs):   Walk and transfer into and out of bed and chair?: Yes  Dress and groom yourself?: Yes    Bathe or shower yourself?: Yes    Feed yourself? Yes  Do your laundry/housekeeping?: Yes  Manage your money, pay your bills and track your expenses?: Yes  Make your own meals?: Yes    Do your own shopping?: Yes    Previous Hospitalizations:   Any hospitalizations or ED visits within the last 12 months?: No      Advance Care Planning:   Living will: No    Advanced directive: No    ACP document given: Yes      Cognitive Screening:   Provider or family/friend/caregiver concerned regarding cognition?: No    PREVENTIVE SCREENINGS      Cardiovascular Screening:    General: Screening Current      Diabetes Screening:     General: Screening Not Indicated and History Diabetes      Colorectal Cancer Screening:     General: Risks and Benefits Discussed    Due for: Cologuard      Breast Cancer Screening:     General: Screening Current      Cervical Cancer Screening:    General: Screening Not Indicated      Osteoporosis Screening:    General: Screening Current      Abdominal Aortic Aneurysm (AAA) Screening:        General: Screening Not Indicated      Lung Cancer Screening:     General: Screening Not Indicated      Hepatitis C Screening:    General: Screening Current    Screening, Brief Intervention, and Referral to Treatment (SBIRT)    Screening  Typical number of drinks in a day: 0  Typical number of drinks in a week: 0  Interpretation: Low risk drinking behavior.     Single Item Drug Screening:  How often have you used an illegal drug (including marijuana) or a prescription medication for non-medical reasons in the past year? never    Single Item Drug Screen Score: 0  Interpretation: Negative screen for possible drug use disorder    No results found. Physical Exam:     /88 (BP Location: Left arm, Patient Position: Sitting, Cuff Size: Standard)   Pulse 89   Temp 98.4 °F (36.9 °C) (Tympanic)   Ht 5' 8" (1.727 m)   Wt (!) 136 kg (300 lb 9.6 oz)   SpO2 100%   BMI 45.71 kg/m²     Physical Exam  Vitals and nursing note reviewed. Constitutional:       General: She is not in acute distress. Appearance: She is well-groomed. She is morbidly obese. She is not ill-appearing, toxic-appearing or diaphoretic. HENT:      Head: Normocephalic and atraumatic. Nose: Nose normal.      Mouth/Throat:      Mouth: Mucous membranes are moist.   Eyes:      Conjunctiva/sclera: Conjunctivae normal.   Neck:      Thyroid: No thyroid mass, thyromegaly or thyroid tenderness. Vascular: No JVD. Trachea: Trachea and phonation normal.   Cardiovascular:      Rate and Rhythm: Normal rate. Rhythm irregularly irregular. Pulses: Normal pulses. no weak pulses          Dorsalis pedis pulses are 2+ on the right side and 2+ on the left side. Posterior tibial pulses are 2+ on the right side and 2+ on the left side. Heart sounds: S1 normal and S2 normal.      No friction rub. No gallop. Pulmonary:      Effort: Pulmonary effort is normal.      Breath sounds: Normal breath sounds and air entry. Abdominal:      General: Bowel sounds are normal. There is no distension. Palpations: Abdomen is soft. There is no hepatomegaly, splenomegaly or mass. Tenderness: There is no abdominal tenderness. Hernia: There is no hernia in the ventral area. Musculoskeletal:      Cervical back: Neck supple. Feet:      Right foot:      Skin integrity: Dry skin present. No ulcer, skin breakdown, erythema, warmth or callus.       Left foot: Skin integrity: Dry skin present. No ulcer, skin breakdown, erythema, warmth or callus. Lymphadenopathy:      Cervical: No cervical adenopathy. Skin:     General: Skin is warm and dry. Coloration: Skin is not pale. Neurological:      Mental Status: She is alert and oriented to person, place, and time. Gait: Gait normal.   Psychiatric:         Mood and Affect: Mood normal.         Behavior: Behavior normal. Behavior is cooperative.         Sam Schaefer, DO

## 2023-11-01 ENCOUNTER — PATIENT OUTREACH (OUTPATIENT)
Dept: FAMILY MEDICINE CLINIC | Facility: CLINIC | Age: 68
End: 2023-11-01

## 2023-11-01 NOTE — PROGRESS NOTES
OP CM reviewed chart and called to pt and LM in regards to [facility] referral.  Pt does not have email or MyChart listed so will call again.

## 2023-11-02 ENCOUNTER — PATIENT OUTREACH (OUTPATIENT)
Dept: FAMILY MEDICINE CLINIC | Facility: CLINIC | Age: 68
End: 2023-11-02

## 2023-11-02 NOTE — LETTER
November 2, 2023     [facility]  [address]  [address]  [place] [address]    Patient: [facility]   YOB: 1955   Date of Visit:        Dear Ms. Schoenberger:    I am the  that covers [employer] [place]. I wanted to reach out to you to see if you need assistance with any .   I can be reached at [phone] Monday through Friday from 8am to 430pm.         Sincerely,        SHERRILL Trinh        CC: No Recipients

## 2023-11-30 ENCOUNTER — VBI (OUTPATIENT)
Dept: ADMINISTRATIVE | Facility: OTHER | Age: 68
End: 2023-11-30

## 2023-12-04 ENCOUNTER — VBI (OUTPATIENT)
Dept: ADMINISTRATIVE | Facility: OTHER | Age: 68
End: 2023-12-04

## 2023-12-05 ENCOUNTER — TELEPHONE (OUTPATIENT)
Dept: NEPHROLOGY | Facility: CLINIC | Age: 68
End: 2023-12-05

## 2023-12-05 ENCOUNTER — VBI (OUTPATIENT)
Dept: ADMINISTRATIVE | Facility: OTHER | Age: 68
End: 2023-12-05

## 2023-12-08 ENCOUNTER — APPOINTMENT (OUTPATIENT)
Dept: LAB | Facility: CLINIC | Age: 68
End: 2023-12-08
Payer: COMMERCIAL

## 2023-12-08 DIAGNOSIS — Z13.220 LIPID SCREENING: ICD-10-CM

## 2023-12-08 DIAGNOSIS — N18.32 STAGE 3B CHRONIC KIDNEY DISEASE (HCC): ICD-10-CM

## 2023-12-08 LAB
ALBUMIN SERPL BCP-MCNC: 4.2 G/DL (ref 3.5–5)
ALP SERPL-CCNC: 87 U/L (ref 34–104)
ALT SERPL W P-5'-P-CCNC: 17 U/L (ref 7–52)
ANION GAP SERPL CALCULATED.3IONS-SCNC: 10 MMOL/L
AST SERPL W P-5'-P-CCNC: 10 U/L (ref 13–39)
BACTERIA UR QL AUTO: ABNORMAL /HPF
BILIRUB SERPL-MCNC: 0.5 MG/DL (ref 0.2–1)
BILIRUB UR QL STRIP: NEGATIVE
BUN SERPL-MCNC: 24 MG/DL (ref 5–25)
CALCIUM SERPL-MCNC: 9.2 MG/DL (ref 8.4–10.2)
CHLORIDE SERPL-SCNC: 103 MMOL/L (ref 96–108)
CLARITY UR: CLEAR
CO2 SERPL-SCNC: 27 MMOL/L (ref 21–32)
COLOR UR: COLORLESS
CREAT SERPL-MCNC: 1.31 MG/DL (ref 0.6–1.3)
CREAT UR-MCNC: 45.8 MG/DL
GFR SERPL CREATININE-BSD FRML MDRD: 41 ML/MIN/1.73SQ M
GLUCOSE P FAST SERPL-MCNC: 102 MG/DL (ref 65–99)
GLUCOSE UR STRIP-MCNC: NEGATIVE MG/DL
HGB UR QL STRIP.AUTO: NEGATIVE
KETONES UR STRIP-MCNC: NEGATIVE MG/DL
LEUKOCYTE ESTERASE UR QL STRIP: ABNORMAL
MICROALBUMIN UR-MCNC: 79.1 MG/L
MICROALBUMIN/CREAT 24H UR: 173 MG/G CREATININE (ref 0–30)
NITRITE UR QL STRIP: NEGATIVE
NON-SQ EPI CELLS URNS QL MICRO: ABNORMAL /HPF
PH UR STRIP.AUTO: 6 [PH]
POTASSIUM SERPL-SCNC: 3.8 MMOL/L (ref 3.5–5.3)
PROT SERPL-MCNC: 7.3 G/DL (ref 6.4–8.4)
PROT UR STRIP-MCNC: ABNORMAL MG/DL
RBC #/AREA URNS AUTO: ABNORMAL /HPF
SODIUM SERPL-SCNC: 140 MMOL/L (ref 135–147)
SP GR UR STRIP.AUTO: 1.01 (ref 1–1.03)
TRANS CELLS #/AREA URNS HPF: PRESENT /[HPF]
UROBILINOGEN UR STRIP-ACNC: <2 MG/DL
WBC #/AREA URNS AUTO: ABNORMAL /HPF

## 2023-12-08 PROCEDURE — 36415 COLL VENOUS BLD VENIPUNCTURE: CPT

## 2023-12-08 PROCEDURE — 80053 COMPREHEN METABOLIC PANEL: CPT

## 2023-12-11 ENCOUNTER — OFFICE VISIT (OUTPATIENT)
Dept: NEPHROLOGY | Facility: CLINIC | Age: 68
End: 2023-12-11
Payer: COMMERCIAL

## 2023-12-11 VITALS
WEIGHT: 293 LBS | HEIGHT: 68 IN | SYSTOLIC BLOOD PRESSURE: 118 MMHG | OXYGEN SATURATION: 97 % | BODY MASS INDEX: 44.41 KG/M2 | HEART RATE: 91 BPM | DIASTOLIC BLOOD PRESSURE: 78 MMHG

## 2023-12-11 DIAGNOSIS — E11.9 DIET-CONTROLLED TYPE 2 DIABETES MELLITUS (HCC): ICD-10-CM

## 2023-12-11 DIAGNOSIS — R80.1 PERSISTENT PROTEINURIA: ICD-10-CM

## 2023-12-11 DIAGNOSIS — N18.30 CONTROLLED TYPE 2 DIABETES MELLITUS WITH STAGE 3 CHRONIC KIDNEY DISEASE, WITHOUT LONG-TERM CURRENT USE OF INSULIN (HCC): ICD-10-CM

## 2023-12-11 DIAGNOSIS — E66.01 MORBID OBESITY (HCC): ICD-10-CM

## 2023-12-11 DIAGNOSIS — Q61.02 MULTIPLE RENAL CYSTS: ICD-10-CM

## 2023-12-11 DIAGNOSIS — E11.22 CONTROLLED TYPE 2 DIABETES MELLITUS WITH STAGE 3 CHRONIC KIDNEY DISEASE, WITHOUT LONG-TERM CURRENT USE OF INSULIN (HCC): ICD-10-CM

## 2023-12-11 DIAGNOSIS — N18.32 STAGE 3B CHRONIC KIDNEY DISEASE (HCC): ICD-10-CM

## 2023-12-11 DIAGNOSIS — I10 ESSENTIAL HYPERTENSION: Primary | ICD-10-CM

## 2023-12-11 PROCEDURE — 99214 OFFICE O/P EST MOD 30 MIN: CPT | Performed by: INTERNAL MEDICINE

## 2023-12-11 NOTE — ASSESSMENT & PLAN NOTE
Lab Results   Component Value Date    HGBA1C 5.4 10/31/2023   Good control to nondiabetic stage  Was diabetic for 3-4 years known  Unlikely that her kidney injury is due to diabetes but always possible

## 2023-12-11 NOTE — ASSESSMENT & PLAN NOTE
Lab Results   Component Value Date    EGFR 41 12/08/2023    EGFR 40 06/02/2023    EGFR 42 09/14/2021    CREATININE 1.31 (H) 12/08/2023    CREATININE 1.33 (H) 09/14/2021    CREATININE 1.20 10/13/2018   Has chronic kidney disease stage IIIB A1 with new onset of albuminuria.   She avoids NSAIDs and has in the past. She had taken Advil when she was still working   Will check a kidney ultrasound, an SPEP and IPEP and baseline serologies  She has been taking lisinopril HCT with little change in her renal function  Discussed use of SGLT 2 inhibitors including mechanism of actoon, risk of UTI and possible cost prohibition  Will do a prior authorization if refused or too costly  Three month follow up for possible progression  Etiology may be due to obesity related FSGS vs diabetic although only known diabetes for 3-4 years  May need a biopsy int he future if kidney function declines further

## 2023-12-11 NOTE — PROGRESS NOTES
[facility] [place], [place]    Name: Ovidio Keys  YOB: 1955      Assessment/Plan:           Problem List Items Addressed This Visit          Endocrine    Controlled type 2 diabetes mellitus with stage 3 chronic kidney disease, without long-term current use of insulin ([address])    Diet-controlled type 2 diabetes mellitus ([address])       Lab Results   Component Value Date    HGBA1C 5.4 10/31/2023   Good control to nondiabetic stage  Was diabetic for 3-4 years known  Unlikely that her kidney injury is due to diabetes but always possible            Cardiovascular and Mediastinum    Essential hypertension - Primary     Well controlled            Genitourinary    Stage 3 chronic kidney disease ([address])     Lab Results   Component Value Date    EGFR 41 12/08/2023    EGFR 40 06/02/2023    EGFR 42 09/14/2021    CREATININE 1.31 (H) 12/08/2023    CREATININE 1.33 (H) 09/14/2021    CREATININE 1.20 10/13/2018   Has chronic kidney disease stage IIIB A1 with new onset of albuminuria.   She avoids NSAIDs and has in the past. She had taken Advil when she was still working   Will check a kidney ultrasound, an SPEP and IPEP and baseline serologies  She has been taking lisinopril HCT with little change in her renal function  Discussed use of SGLT 2 inhibitors including mechanism of actoon, risk of UTI and possible cost prohibition  Will do a prior authorization if refused or too costly  Three month follow up for possible progression  Etiology may be due to obesity related FSGS vs diabetic although only known diabetes for 3-4 years  May need a biopsy int he future if kidney function declines further           Multiple renal cysts     Recheck a kidney ultrasound            Other    Morbid obesity ([address])     I suggested Mounjaro for weight loss which would help her kidney fucntion         Persistent proteinuria     See above              Subjective:      Patient ID: Ovidio Keys is a 76 y.o. female. Referred by Dr Sam Schaefer    HPI Has a history of chronic kidney disease stage III with a baseline creatinine of 1.1-1.3 mg/dl, diabetes mellitus diet controlled and primary hypertension. Her last A1c was 5.4%    The following portions of the patient's history were reviewed and updated as appropriate: allergies, current medications, past family history, past medical history, past social history, past surgical history and problem list.    Review of Systems   Constitutional:  Negative for fatigue. HENT:  Negative for hearing loss. Eyes:  Negative for visual disturbance. Respiratory:  Negative for cough and shortness of breath. Cardiovascular:  Positive for leg swelling. Negative for chest pain and palpitations. Gastrointestinal:  Negative for abdominal pain, blood in stool, constipation and diarrhea. Genitourinary:  Negative for difficulty urinating, dysuria, frequency, hematuria and urgency. Musculoskeletal:  Positive for arthralgias. Neurological:  Negative for dizziness, weakness and headaches. Hematological:  Bruises/bleeds easily. Psychiatric/Behavioral:  Negative for dysphoric mood.           Social History     Socioeconomic History    Marital status: Single     Spouse name: None    Number of children: None    Years of education: None    Highest education level: None   Occupational History    None   Tobacco Use    Smoking status: Never    Smokeless tobacco: Never   Vaping Use    Vaping Use: Never used   Substance and Sexual Activity    Alcohol use: No    Drug use: No    Sexual activity: Not Currently   Other Topics Concern    None   Social History Narrative    None     Social Determinants of Health     Financial Resource Strain: Medium Risk (11/1/2023)    Overall Financial Resource Strain (CARDIA)     Difficulty of Paying Living Expenses: Somewhat hard   Food Insecurity: Not on file   Transportation Needs: No Transportation Needs (11/1/2023)    PRAPARE - Transportation     Lack of Transportation (Medical): No     Lack of Transportation (Non-Medical): No   Physical Activity: Not on file   Stress: Not on file   Social Connections: Not on file   Intimate Partner Violence: Not on file   Housing Stability: Not on file     Past Medical History:   Diagnosis Date    Elevated fasting glucose 8/8/2019    Hypokalemia     Last Assessed: 8/12/2016     Hyponatremia     Last Assessed: 8/12/2016     Irregular heartbeat     A Flutter 7/2015 while in hospital for pneumonia, resolved     Pneumonia     Vitamin D deficiency 3/15/2022     History reviewed. No pertinent surgical history.     Current Outpatient Medications:     amLODIPine (NORVASC) 5 mg tablet, TAKE 1 TABLET BY MOUTH EVERY DAY, Disp: 90 tablet, Rfl: 1    aspirin 81 MG tablet, Take 1 tablet by mouth daily, Disp: , Rfl:     Cholecalciferol (VITAMIN D3) 2000 units capsule, Take 1 capsule by mouth daily, Disp: , Rfl:     CINNAMON PO, Take 1 capsule by mouth daily, Disp: , Rfl:     Coenzyme Q-10 200 MG CAPS, Take 1 capsule by mouth, Disp: , Rfl:     Cranberry 500 MG CAPS, Take 1 capsule by mouth daily, Disp: , Rfl:     lisinopril-hydrochlorothiazide (PRINZIDE,ZESTORETIC) 20-25 MG per tablet, TAKE 1/2 TABLET BY MOUTH EVERY MORNING, Disp: 45 tablet, Rfl: 3    metoprolol tartrate (LOPRESSOR) 50 mg tablet, TAKE 1 TABLET BY MOUTH TWICE A DAY, Disp: 180 tablet, Rfl: 1    Multiple Vitamins-Minerals (DAILY MULTIPLE VITAMINS/MIN PO), Take 1 tablet by mouth daily, Disp: , Rfl:     Omega-3 Fatty Acids (FISH OIL) 1,000 mg, Take 1 capsule by mouth daily, Disp: , Rfl:     Protein (NUTRA/PRO CHOCOLATE PO), Take by mouth, Disp: , Rfl:     Lab Results   Component Value Date     04/14/2018    SODIUM 140 12/08/2023    K 3.8 12/08/2023     12/08/2023    CO2 27 12/08/2023    ANIONGAP 13.4 04/14/2018    AGAP 10 12/08/2023    BUN 24 12/08/2023    CREATININE 1.31 (H) 12/08/2023    GLUF 102 (H) 12/08/2023    CALCIUM 9.2 12/08/2023    AST 10 (L) 12/08/2023    ALT 17 12/08/2023    ALKPHOS 87 12/08/2023    PROT 7.2 04/14/2018    TP 7.3 12/08/2023    BILITOT 0.4 04/14/2018    TBILI 0.50 12/08/2023    EGFR 41 12/08/2023     Lab Results   Component Value Date    WBC 5.58 09/14/2021    HGB 11.8 09/14/2021    HCT 38.1 09/14/2021    MCV 93 09/14/2021     09/14/2021     Lab Results   Component Value Date    CHOLESTEROL 161 10/13/2018    CHOLESTEROL 182 04/19/2017     Lab Results   Component Value Date    HDL 51 10/13/2018    HDL 68 (H) 04/19/2017    HDL 47 07/27/2016     Lab Results   Component Value Date    LDLCALC 92 10/13/2018    LDLCALC 100 04/19/2017     Lab Results   Component Value Date    TRIG 88 10/13/2018    TRIG 68 04/19/2017    TRIG 104 07/27/2016     No results found for: "CHOLHDL"  Lab Results   Component Value Date    PCL9BQTTOZGL 3.460 04/19/2017     Lab Results   Component Value Date    CALCIUM 9.2 12/08/2023     No results found for: "SPEP", "UPEP"  No results found for: "MICROALBUR", "[id number]"     mg/g  UA bland other than trace protein       KIDNEYS:  Symmetric and normal size. Right kidney:  13.0 cm. Left kidney:  12.7 cm. Right kidney  Normal echogenicity and contour. Exophytic simple cyst at the upper pole measures 8.8 x 6.8 x 8.3 cm. Midpole simple cyst measures 2.6 x 3.2 x 2.4 cm. Lower pole simple cyst measures 1.6 x 1.7 x 2.1 cm. No hydronephrosis. No shadowing calculi. No perinephric fluid collections. Left kidney  Normal echogenicity and contour. Exophytic upper pole cyst measures 6.7 x 5.4 x 7.6 cm. No hydronephrosis. No shadowing calculi. No perinephric fluid collections. URETERS:  Nonvisualized. BLADDER:   Normally distended. No focal thickening or mass lesions. Bilateral ureteral jets detected.        Objective:      /78 (BP Location: Left arm, Patient Position: Sitting, Cuff Size: Large)   Pulse 91   Ht 5' 8" (1.727 m)   Wt (!) 137 kg (303 lb)   SpO2 97%   BMI 46.07 kg/m² Physical Exam  Constitutional:       General: She is not in acute distress. Appearance: She is obese. She is not toxic-appearing. HENT:      Head: Normocephalic and atraumatic. Right Ear: External ear normal.      Left Ear: External ear normal.   Eyes:      Extraocular Movements: Extraocular movements intact. Conjunctiva/sclera: Conjunctivae normal.   Cardiovascular:      Rate and Rhythm: Normal rate and regular rhythm. Heart sounds: Normal heart sounds. Pulmonary:      Effort: Pulmonary effort is normal. No respiratory distress. Breath sounds: Normal breath sounds. No wheezing. Abdominal:      General: Bowel sounds are normal. There is no distension. Palpations: Abdomen is soft. Musculoskeletal:      Right lower leg: Edema present. Left lower leg: Edema present. Comments: Trace edema   Skin:     General: Skin is warm and dry. Neurological:      General: No focal deficit present. Mental Status: She is alert and oriented to person, place, and time. Psychiatric:         Mood and Affect: Mood normal.         Behavior: Behavior normal.         Thought Content:  Thought content normal.         Judgment: Judgment normal.

## 2023-12-19 ENCOUNTER — HOSPITAL ENCOUNTER (OUTPATIENT)
Dept: ULTRASOUND IMAGING | Facility: HOSPITAL | Age: 68
Discharge: HOME | End: 2023-12-19
Attending: INTERNAL MEDICINE
Payer: COMMERCIAL

## 2023-12-19 DIAGNOSIS — R80.1 PERSISTENT PROTEINURIA: ICD-10-CM

## 2023-12-19 DIAGNOSIS — N18.32 STAGE 3B CHRONIC KIDNEY DISEASE (HCC): ICD-10-CM

## 2023-12-19 PROCEDURE — 76775 US EXAM ABDO BACK WALL LIM: CPT

## 2023-12-20 LAB — COLOGUARD RESULT REPORTABLE: NEGATIVE

## 2023-12-26 ENCOUNTER — TELEPHONE (OUTPATIENT)
Dept: NEPHROLOGY | Facility: CLINIC | Age: 68
End: 2023-12-26

## 2023-12-30 PROBLEM — Z00.00 MEDICARE ANNUAL WELLNESS VISIT, SUBSEQUENT: Status: RESOLVED | Noted: 2022-10-25 | Resolved: 2023-12-30

## 2024-01-09 DIAGNOSIS — N18.32 STAGE 3B CHRONIC KIDNEY DISEASE (HCC): Primary | ICD-10-CM

## 2024-03-12 ENCOUNTER — TELEPHONE (OUTPATIENT)
Dept: NEPHROLOGY | Facility: CLINIC | Age: 69
End: 2024-03-12

## 2024-03-12 NOTE — TELEPHONE ENCOUNTER
I called and spoke with Peace. She is aware of labs needing to be completed prior to upcoming appt on 03/21/24. Lab script faxed to [facility].

## 2024-03-16 LAB
ANION GAP SERPL CALCULATED.3IONS-SCNC: 12 MMOL/L (ref 3–11)
BUN SERPL-MCNC: 37 MG/DL (ref 7–25)
CALCIUM SERPL-MCNC: 9.5 MG/DL (ref 8.5–10.1)
CHLORIDE SERPL-SCNC: 103 MMOL/L (ref 100–109)
CO2 SERPL-SCNC: 26 MMOL/L (ref 21–31)
CREAT SERPL-MCNC: 1.72 MG/DL (ref 0.4–1.1)
CYTOLOGY CMNT CVX/VAG CYTO-IMP: ABNORMAL
GFR/BSA.PRED SERPLBLD CYS-BASED-ARV: 32 ML/MIN/{1.73_M2}
GLUCOSE SERPL-MCNC: 95 MG/DL (ref 65–99)
POTASSIUM SERPL-SCNC: 4.2 MMOL/L (ref 3.5–5.2)
SODIUM SERPL-SCNC: 141 MMOL/L (ref 135–145)

## 2024-03-19 ENCOUNTER — TELEPHONE (OUTPATIENT)
Dept: NEPHROLOGY | Facility: CLINIC | Age: 69
End: 2024-03-19

## 2024-03-19 NOTE — TELEPHONE ENCOUNTER
I called and spoke with patient, she is aware of her lab results.         ----- Message from Jean-Paul Huntley, DO sent at 3/18/2024  6:18 PM EDT -----  Labs reviewed, kidney function is a little bit lower than previous check.  Please ensure that she is drinking and eating normally.  Labs to be reviewed in detail at her next appointment with follow-up labs after that.

## 2024-03-21 ENCOUNTER — OFFICE VISIT (OUTPATIENT)
Dept: NEPHROLOGY | Facility: CLINIC | Age: 69
End: 2024-03-21
Payer: COMMERCIAL

## 2024-03-21 VITALS
BODY MASS INDEX: 44.41 KG/M2 | HEIGHT: 68 IN | HEART RATE: 97 BPM | WEIGHT: 293 LBS | OXYGEN SATURATION: 99 % | SYSTOLIC BLOOD PRESSURE: 120 MMHG | DIASTOLIC BLOOD PRESSURE: 80 MMHG

## 2024-03-21 DIAGNOSIS — R60.0 BILATERAL LOWER EXTREMITY EDEMA: ICD-10-CM

## 2024-03-21 DIAGNOSIS — I10 ESSENTIAL HYPERTENSION: ICD-10-CM

## 2024-03-21 DIAGNOSIS — E66.01 MORBID OBESITY (HCC): ICD-10-CM

## 2024-03-21 DIAGNOSIS — N18.32 STAGE 3B CHRONIC KIDNEY DISEASE (HCC): Primary | ICD-10-CM

## 2024-03-21 DIAGNOSIS — N28.1 CYST OF KIDNEY, ACQUIRED: ICD-10-CM

## 2024-03-21 DIAGNOSIS — N18.30 CONTROLLED TYPE 2 DIABETES MELLITUS WITH STAGE 3 CHRONIC KIDNEY DISEASE, WITHOUT LONG-TERM CURRENT USE OF INSULIN (HCC): ICD-10-CM

## 2024-03-21 DIAGNOSIS — E55.9 VITAMIN D DEFICIENCY: ICD-10-CM

## 2024-03-21 DIAGNOSIS — E11.22 CONTROLLED TYPE 2 DIABETES MELLITUS WITH STAGE 3 CHRONIC KIDNEY DISEASE, WITHOUT LONG-TERM CURRENT USE OF INSULIN (HCC): ICD-10-CM

## 2024-03-21 PROCEDURE — 99214 OFFICE O/P EST MOD 30 MIN: CPT | Performed by: NURSE PRACTITIONER

## 2024-03-21 RX ORDER — TORSEMIDE 10 MG/1
10 TABLET ORAL AS NEEDED
Qty: 20 TABLET | Refills: 1 | Status: SHIPPED | OUTPATIENT
Start: 2024-03-21

## 2024-03-21 RX ORDER — LISINOPRIL 10 MG/1
10 TABLET ORAL DAILY
Qty: 90 TABLET | Refills: 1 | Status: SHIPPED | OUTPATIENT
Start: 2024-03-21

## 2024-03-21 NOTE — PATIENT INSTRUCTIONS
It was nice to see you today    Please stop lisinopril-hctz 20-25 combo pill and just take 10 mg of lisinopril daily which I sent to pharmacy. No need to split in half as I sent the correct dose in  Use torsemide 10 mg AS NEEDED no more than once a day for swelling of the legs  Please repeat lab work in 1 month    Please repeat ultrasound in December of 2024  Please reduce sodium in diet. Avoid too much cheese, processed foods, soup, noodles etc  Try to eat more fruits, vegetables and sources of protein. Try berries with some honey and almonds on it. Try greek yogurt or cottage cheese.

## 2024-03-21 NOTE — PROGRESS NOTES
Nephrology   Office Follow-Up  Sharon Schoenberger 68 y.o. female MRN: [id number]    Encounter: [id number]        Assessment & Plan    Sharon Schoenberger was seen in the [place] office today. All diagnoses and orders for visit:     JUANJO atop Stage 3b chronic kidney disease (HCC)  Baseline creatinine appears to be 1.1-1.3 mg/dL dating back to 2016. Most recent creatinine 1.7 mg/dL, unclear etiology, potentially prerenal/auto dysregulation. She was started on SGLT2i in December. Remains on ace inhibitor and HCTZ.  Stop HCTZ and continue reduced dose of lisinopril 10 mg daily. No change to SGLT2i at this time. Use torsemide as needed for swelling.   Repeat BMP no later than 1 month  -     Basic metabolic panel; Future; Expected date: 04/16/2024  -     Urinalysis with microscopic; Future; Expected date: 04/16/2024  -     Albumin / creatinine urine ratio; Future; Expected date: 04/16/2024  -     Magnesium; Future; Expected date: 04/16/2024  2. Essential hypertension  Blood pressure log 120/80 mmHg in office. She brought her BP cuff in to review. See below for readings. Her cuff is a brachial cuff but it is a bit small and this will cause higher readings.   Stop HCTZ. Call in appropriate dose of lisinopril and can reduce if needed. Repeat labs no later than 1 month  -     lisinopril (ZESTRIL) 10 mg tablet; Take 1 tablet (10 mg total) by mouth daily  -     Aldosterone/Renin Activity Ratio; Future; Expected date: 04/16/2024  3. Bilateral lower extremity edema  Use torsemide as needed for swelling as I am stopping HCTZ. She does enjoy a high sodium diet. We discussed some meal options which are lower in salt today  -     torsemide (DEMADEX) 10 mg tablet; Take 1 tablet (10 mg total) by mouth if needed (take as needed for swelling - no more than 1 a day unless directed by nephrologist)  4. Controlled type 2 diabetes mellitus with stage 3 chronic kidney disease, without long-term current use of insulin ([facility])  A1C 5.4% 2023.  Repeat urine studies with labs. No change to SGLT2i at present  5. Morbid obesity (HCC)  Encouraged reduction in calories.   6. Cyst of kidney, acquired  Cysts enlarging on last ultrasound repeat in 1 year- December 2024  -     US kidney and bladder; Future; Expected date: 12/02/2024  7. Vitamin D deficiency  -     Vitamin D 25 hydroxy; Future; Expected date: 04/16/2024          HPI: Sharon Schoenberger is a 68 y.o. female who is here for scheduled follow-up     Pertinent problems include CKD 3b, HTN, obesity, T2DM, proteinuria, obesity    Patient is in JUANJO potentially prerenal/vasomotor dysfunction from HCTZ + SGLT2i. Will stop HCTZ. Repeat labs no later than 1 month      Home blood pressure log  153/87 80  141/82 92  107/62 85  103/61 89  125/69 68  122/68 74    ROS:   Review of Systems   Constitutional:  Negative for chills and fever.   HENT:  Negative for ear pain and sore throat.    Eyes:  Negative for pain and visual disturbance.   Respiratory:  Negative for cough and shortness of breath.    Cardiovascular:  Positive for leg swelling. Negative for chest pain and palpitations.   Gastrointestinal:  Negative for abdominal pain and vomiting.   Genitourinary:  Negative for dysuria and hematuria.   Musculoskeletal:  Negative for arthralgias and back pain.   Skin:  Negative for color change and rash.   Neurological:  Negative for seizures and syncope.   All other systems reviewed and are negative.      Allergies: Seasonal ic  [cholestatin]    Medications:   Current Outpatient Medications:     amLODIPine (NORVASC) 5 mg tablet, TAKE 1 TABLET BY MOUTH EVERY DAY, Disp: 90 tablet, Rfl: 1    aspirin 81 MG tablet, Take 1 tablet by mouth daily, Disp: , Rfl:     Cholecalciferol (VITAMIN D3) 2000 units capsule, Take 1 capsule by mouth daily, Disp: , Rfl:     CINNAMON PO, Take 1 capsule by mouth daily, Disp: , Rfl:     Coenzyme Q-10 200 MG CAPS, Take 1 capsule by mouth, Disp: , Rfl:     Cranberry 500 MG CAPS, Take 1 capsule by  mouth daily, Disp: , Rfl:     Empagliflozin (Jardiance) 10 MG TABS tablet, Take 1 tablet (10 mg total) by mouth every morning, Disp: 30 tablet, Rfl: 4    lisinopril (ZESTRIL) 10 mg tablet, Take 1 tablet (10 mg total) by mouth daily, Disp: 90 tablet, Rfl: 1    metoprolol tartrate (LOPRESSOR) 50 mg tablet, TAKE 1 TABLET BY MOUTH TWICE A DAY, Disp: 180 tablet, Rfl: 1    Multiple Vitamins-Minerals (DAILY MULTIPLE VITAMINS/MIN PO), Take 1 tablet by mouth daily, Disp: , Rfl:     Omega-3 Fatty Acids (FISH OIL) 1,000 mg, Take 1 capsule by mouth daily, Disp: , Rfl:     Protein (NUTRA/PRO CHOCOLATE PO), Take by mouth, Disp: , Rfl:     torsemide (DEMADEX) 10 mg tablet, Take 1 tablet (10 mg total) by mouth if needed (take as needed for swelling - no more than 1 a day unless directed by nephrologist), Disp: 20 tablet, Rfl: 1    Past Medical History:   Diagnosis Date    Elevated fasting glucose 8/8/2019    Hypokalemia     Last Assessed: 8/12/2016     Hyponatremia     Last Assessed: 8/12/2016     Irregular heartbeat     A Flutter 7/2015 while in hospital for pneumonia, resolved     Pneumonia     Vitamin D deficiency 3/15/2022     History reviewed. No pertinent surgical history.  Family History   Problem Relation Age of Onset    Colon cancer Mother     Tongue cancer Father     Hypertension Father     Other Sister         Epilepsy     Hypertension Sister     No Known Problems Sister     No Known Problems Maternal Grandmother     No Known Problems Maternal Grandfather     Breast cancer Paternal Grandmother 85    Colon cancer Brother     Lung cancer Brother         secondary     Hypertension Brother     Breast cancer Maternal Aunt     No Known Problems Maternal Aunt     No Known Problems Paternal Aunt     Cancer Neg Hx     BRCA2 Positive Neg Hx     BRCA2 Negative Neg Hx     BRCA1 Positive Neg Hx     BRCA1 Negative Neg Hx     Ovarian cancer Neg Hx     BRCA 1/2 Neg Hx     Endometrial cancer Neg Hx     Breast cancer additional onset Neg  "Hx       reports that she has never smoked. She has never used smokeless tobacco. She reports that she does not drink alcohol and does not use drugs.      Physical Exam:   Vitals:    03/21/24 0925   BP: 120/80   BP Location: Left arm   Patient Position: Sitting   Cuff Size: Large   Pulse: 97   SpO2: 99%   Weight: 134 kg (295 lb)   Height: 5' 8\" (1.727 m)     Body mass index is 44.85 kg/m².    General: conscious, cooperative, in no acute distress, appears stated age  Eyes: conjunctivae pale, anicteric sclerae  ENT: lips and mucous membranes moist  Neck: supple, no JVD, no masses  Chest:  essentially clear breath sounds bilaterally, no crackles, ronchus or wheezings  CVS: S1 & S2, normal rate, regular rhythm  Abdomen: soft, non-tender, non-distended, normoactive bowel sounds, rounded obese  Extremities: moderate edema of both legs  Skin: no rash   Neuro: awake, alert, oriented       Diagnostic Data:  Lab: I have personally reviewed pertinent lab results.,   CBC:       CMP: No results found for: \"SODIUM\", \"K\", \"CL\", \"CO2\", \"ANIONGAP\", \"BUN\", \"CREATININE\", \"GLUCOSE\", \"CALCIUM\", \"AST\", \"ALT\", \"ALKPHOS\", \"PROT\", \"BILITOT\", \"EGFR\",   PT/INR: No results found for: \"PT\", \"INR\",   Magnesium: No components found for: \"MAG\",  Phosphorous: No results found for: \"PHOS\"    Patient Instructions   It was nice to see you today    Please stop lisinopril-hctz 20-25 combo pill and just take 10 mg of lisinopril daily which I sent to pharmacy. No need to split in half as I sent the correct dose in  Use torsemide 10 mg AS NEEDED no more than once a day for swelling of the legs  Please repeat lab work in 1 month    Please repeat ultrasound in December of 2024  Please reduce sodium in diet. Avoid too much cheese, processed foods, soup, noodles etc  Try to eat more fruits, vegetables and sources of protein. Try berries with some honey and almonds on it. Try greek yogurt or cottage cheese.     Portions of the record may have been created with " "voice recognition software. Occasional wrong word or \"sound a like\" substitutions may have occurred due to the inherent limitations of voice recognition software. Read the chart carefully and recognize, using context, where substitutions have occurred.    If you have any questions, please contact the dictating provider.  "

## 2024-04-23 DIAGNOSIS — I10 ESSENTIAL HYPERTENSION: ICD-10-CM

## 2024-04-23 RX ORDER — AMLODIPINE BESYLATE 5 MG/1
TABLET ORAL
Qty: 90 TABLET | Refills: 1 | Status: SHIPPED | OUTPATIENT
Start: 2024-04-23

## 2024-04-23 RX ORDER — METOPROLOL TARTRATE 50 MG/1
TABLET, FILM COATED ORAL
Qty: 180 TABLET | Refills: 1 | Status: SHIPPED | OUTPATIENT
Start: 2024-04-23

## 2024-04-26 LAB
25(OH)D3+25(OH)D2 SERPL-MCNC: 38 NG/ML (ref 30–100)
ALBUMIN/CREAT UR: 60.9
ANION GAP SERPL CALCULATED.3IONS-SCNC: 11 MMOL/L (ref 3–11)
BUN SERPL-MCNC: 29 MG/DL (ref 7–25)
CALCIUM SERPL-MCNC: 9.2 MG/DL (ref 8.5–10.1)
CHLORIDE SERPL-SCNC: 101 MMOL/L (ref 100–109)
CO2 SERPL-SCNC: 26 MMOL/L (ref 21–31)
CREAT SERPL-MCNC: 1.41 MG/DL (ref 0.4–1.1)
CREAT UR-MCNC: 44.3 MG/DL (ref 50–200)
CYTOLOGY CMNT CVX/VAG CYTO-IMP: ABNORMAL
GFR/BSA.PRED SERPLBLD CYS-BASED-ARV: 40 ML/MIN/{1.73_M2}
GLUCOSE SERPL-MCNC: 95 MG/DL (ref 65–99)
MAGNESIUM SERPL-MCNC: 2 MG/DL (ref 1.4–2.2)
MICROALBUMIN UR-MCNC: 2.7 MG/DL
POTASSIUM SERPL-SCNC: 4.7 MMOL/L (ref 3.5–5.2)
SODIUM SERPL-SCNC: 138 MMOL/L (ref 135–145)

## 2024-04-26 PROCEDURE — 3061F NEG MICROALBUMINURIA REV: CPT | Performed by: FAMILY MEDICINE

## 2024-04-27 LAB
BACTERIA URNS QL MICRO: ABNORMAL
GLUCOSE UR QL STRIP: ABNORMAL MG/DL
HGB UR QL STRIP: NEGATIVE MG/DL
KETONES UR QL STRIP: NEGATIVE MG/DL
LEUKOCYTE ESTERASE UR QL STRIP: 500 /UL
NITRITE UR QL STRIP: NEGATIVE
PH UR: 6 [PH] (ref 4.5–8)
PROT 24H UR-MRATE: NEGATIVE MG/DL
RBC #/AREA URNS HPF: ABNORMAL /HPF (ref 0–2)
RENAL EPI CELLS #/AREA URNS HPF: ABNORMAL /HPF (ref 0–1)
SL AMB POCT URINE COMMENT: ABNORMAL
SP GR UR: 1.01 (ref 1–1.03)
SQUAMOUS #/AREA URNS HPF: >10 /LPF (ref 0–5)
WBC #/AREA URNS HPF: ABNORMAL /HPF (ref 0–5)

## 2024-04-29 LAB
ALDOST SERPL-MCNC: 13.7 NG/DL
ALDOST/RENIN PLAS-RTO: 6.5 RATIO (ref 0.9–28.9)
RENIN PLAS-CCNC: 2.1 NG/ML/H

## 2024-04-30 ENCOUNTER — OFFICE VISIT (OUTPATIENT)
Dept: FAMILY MEDICINE CLINIC | Facility: CLINIC | Age: 69
End: 2024-04-30
Payer: COMMERCIAL

## 2024-04-30 VITALS
DIASTOLIC BLOOD PRESSURE: 74 MMHG | SYSTOLIC BLOOD PRESSURE: 130 MMHG | WEIGHT: 273 LBS | BODY MASS INDEX: 41.37 KG/M2 | OXYGEN SATURATION: 99 % | HEART RATE: 75 BPM | HEIGHT: 68 IN | TEMPERATURE: 97.6 F

## 2024-04-30 DIAGNOSIS — I48.11 LONGSTANDING PERSISTENT ATRIAL FIBRILLATION (HCC): ICD-10-CM

## 2024-04-30 DIAGNOSIS — E11.69 TYPE 2 DIABETES MELLITUS WITH OBESITY  (HCC): ICD-10-CM

## 2024-04-30 DIAGNOSIS — Z12.31 BREAST CANCER SCREENING BY MAMMOGRAM: ICD-10-CM

## 2024-04-30 DIAGNOSIS — E66.9 TYPE 2 DIABETES MELLITUS WITH OBESITY  (HCC): ICD-10-CM

## 2024-04-30 DIAGNOSIS — E11.22 CONTROLLED TYPE 2 DIABETES MELLITUS WITH STAGE 3 CHRONIC KIDNEY DISEASE, WITHOUT LONG-TERM CURRENT USE OF INSULIN (HCC): ICD-10-CM

## 2024-04-30 DIAGNOSIS — Z13.220 LIPID SCREENING: ICD-10-CM

## 2024-04-30 DIAGNOSIS — N18.30 CONTROLLED TYPE 2 DIABETES MELLITUS WITH STAGE 3 CHRONIC KIDNEY DISEASE, WITHOUT LONG-TERM CURRENT USE OF INSULIN (HCC): ICD-10-CM

## 2024-04-30 DIAGNOSIS — E11.69 TYPE 2 DIABETES MELLITUS WITH MORBID OBESITY (HCC): Primary | ICD-10-CM

## 2024-04-30 DIAGNOSIS — I10 ESSENTIAL HYPERTENSION: ICD-10-CM

## 2024-04-30 DIAGNOSIS — E66.01 TYPE 2 DIABETES MELLITUS WITH MORBID OBESITY (HCC): Primary | ICD-10-CM

## 2024-04-30 LAB — SL AMB POCT HEMOGLOBIN AIC: 5.2 (ref ?–6.5)

## 2024-04-30 PROCEDURE — 3725F SCREEN DEPRESSION PERFORMED: CPT | Performed by: FAMILY MEDICINE

## 2024-04-30 PROCEDURE — 99214 OFFICE O/P EST MOD 30 MIN: CPT | Performed by: FAMILY MEDICINE

## 2024-04-30 PROCEDURE — 3078F DIAST BP <80 MM HG: CPT | Performed by: FAMILY MEDICINE

## 2024-04-30 PROCEDURE — 1101F PT FALLS ASSESS-DOCD LE1/YR: CPT | Performed by: FAMILY MEDICINE

## 2024-04-30 PROCEDURE — 3044F HG A1C LEVEL LT 7.0%: CPT | Performed by: FAMILY MEDICINE

## 2024-04-30 PROCEDURE — 1160F RVW MEDS BY RX/DR IN RCRD: CPT | Performed by: FAMILY MEDICINE

## 2024-04-30 PROCEDURE — 3066F NEPHROPATHY DOC TX: CPT | Performed by: FAMILY MEDICINE

## 2024-04-30 PROCEDURE — 1159F MED LIST DOCD IN RCRD: CPT | Performed by: FAMILY MEDICINE

## 2024-04-30 PROCEDURE — 83036 HEMOGLOBIN GLYCOSYLATED A1C: CPT | Performed by: FAMILY MEDICINE

## 2024-04-30 PROCEDURE — 3075F SYST BP GE 130 - 139MM HG: CPT | Performed by: FAMILY MEDICINE

## 2024-04-30 PROCEDURE — 3288F FALL RISK ASSESSMENT DOCD: CPT | Performed by: FAMILY MEDICINE

## 2024-04-30 RX ORDER — ATORVASTATIN CALCIUM 10 MG/1
10 TABLET, FILM COATED ORAL DAILY
Qty: 90 TABLET | Refills: 1 | Status: SHIPPED | OUTPATIENT
Start: 2024-04-30

## 2024-04-30 NOTE — PROGRESS NOTES
Name: Sharon Schoenberger      : 1955      MRN: [id number]  Encounter Provider: Jazmín Todd DO  Encounter Date: 2024   Encounter department: [facility]    Assessment & Plan     1. Type 2 diabetes mellitus with morbid obesity (HCC)  -     ECG 12 lead; Future  -     atorvastatin (LIPITOR) 10 mg tablet; Take 1 tablet (10 mg total) by mouth daily  -     Lipid panel; Future    2. Type 2 diabetes mellitus with obesity  (HCC)  -     POCT hemoglobin A1c    3. Controlled type 2 diabetes mellitus with stage 3 chronic kidney disease, without long-term current use of insulin (HCC)    4. Essential hypertension  -     ECG 12 lead; Future    5. Longstanding persistent atrial fibrillation (HCC)  Comments:  pt has long refused to take anticoagulation or get echo, she takes 81mg ASA; not agreeable to f/u with cardiol  Orders:  -     ECG 12 lead; Future    6. Breast cancer screening by mammogram  -     Mammo screening bilateral w 3d & cad; Future; Expected date: 2024    7. Lipid screening  -     Lipid panel; Future    Afib rate remains controlled on BB + calcium blocker; she understands that ASA is not an effective or approved method of clot prevention in Afib, she understands that her risk of stroke remains high ([id number] VASc score 4); she ultimately continues to refuse AC; she does agree to obtaining an updated EKG - last was done at cardiologist office when she last saw cardiol , despite order having been given since then  Other chronic problems stable, well controlled, cpm        Subjective      Chief Complaint   Patient presents with   • Follow-up       Scheduled f/u   No interval acute problems or current concerns per pt  Re: Longstanding Afib- Pt has refused and still refuses AC, takes OTC 81mg ASA; refused to have echo or go back to see cardiologist- last seen   Review of Systems   Constitutional: Negative.    Respiratory: Negative.     Cardiovascular: Negative.   "  Gastrointestinal: Negative.    Neurological: Negative.    Hematological: Negative.      Current Outpatient Medications on File Prior to Visit   Medication Sig   • amLODIPine (NORVASC) 5 mg tablet TAKE 1 TABLET BY MOUTH EVERY DAY   • aspirin 81 MG tablet Take 1 tablet by mouth daily   • Cholecalciferol (VITAMIN D3) 2000 units capsule Take 1 capsule by mouth daily   • CINNAMON PO Take 1 capsule by mouth daily   • Coenzyme Q-10 200 MG CAPS Take 1 capsule by mouth   • Cranberry 500 MG CAPS Take 1 capsule by mouth daily   • Empagliflozin (Jardiance) 10 MG TABS tablet Take 1 tablet (10 mg total) by mouth every morning   • lisinopril (ZESTRIL) 10 mg tablet Take 1 tablet (10 mg total) by mouth daily   • metoprolol tartrate (LOPRESSOR) 50 mg tablet TAKE 1 TABLET BY MOUTH TWICE A DAY   • Multiple Vitamins-Minerals (DAILY MULTIPLE VITAMINS/MIN PO) Take 1 tablet by mouth daily   • Omega-3 Fatty Acids (FISH OIL) 1,000 mg Take 1 capsule by mouth daily   • Protein (NUTRA/PRO CHOCOLATE PO) Take by mouth   • torsemide (DEMADEX) 10 mg tablet Take 1 tablet (10 mg total) by mouth if needed (take as needed for swelling - no more than 1 a day unless directed by nephrologist)       Objective     /74 (BP Location: Left arm, Patient Position: Sitting, Cuff Size: Large)   Pulse 75   Temp 97.6 °F (36.4 °C)   Ht 5' 8\" (1.727 m)   Wt 124 kg (273 lb)   SpO2 99%   BMI 41.51 kg/m²     Physical Exam  Constitutional:       General: She is not in acute distress.     Appearance: She is well-groomed. She is morbidly obese. She is not ill-appearing, toxic-appearing or diaphoretic.   HENT:      Head: Normocephalic and atraumatic.      Mouth/Throat:      Pharynx: Uvula midline.   Neck:      Thyroid: No thyroid mass, thyromegaly or thyroid tenderness.      Vascular: No JVD.      Trachea: Trachea and phonation normal.   Cardiovascular:      Rate and Rhythm: Normal rate. Rhythm irregularly irregular.      Pulses: Normal pulses.      Heart " sounds: S1 normal and S2 normal.      No friction rub. No gallop.   Pulmonary:      Effort: Pulmonary effort is normal.      Breath sounds: Normal breath sounds and air entry.   Abdominal:      Palpations: Abdomen is soft. There is no hepatomegaly, splenomegaly or mass.      Tenderness: There is no abdominal tenderness.   Musculoskeletal:      Cervical back: Neck supple.      Right lower leg: No edema.      Left lower leg: No edema.   Lymphadenopathy:      Cervical: No cervical adenopathy.   Skin:     General: Skin is warm and dry.      Capillary Refill: Capillary refill takes less than 2 seconds.      Coloration: Skin is not pale.   Neurological:      General: No focal deficit present.      Mental Status: She is alert and oriented to person, place, and time.      Gait: Gait normal.   Psychiatric:         Mood and Affect: Mood normal.         Behavior: Behavior normal. Behavior is cooperative.         Cognition and Memory: Cognition normal.   Jazmín Todd DO

## 2024-05-06 DIAGNOSIS — N18.30 CONTROLLED TYPE 2 DIABETES MELLITUS WITH STAGE 3 CHRONIC KIDNEY DISEASE, WITHOUT LONG-TERM CURRENT USE OF INSULIN (HCC): ICD-10-CM

## 2024-05-06 DIAGNOSIS — E11.22 CONTROLLED TYPE 2 DIABETES MELLITUS WITH STAGE 3 CHRONIC KIDNEY DISEASE, WITHOUT LONG-TERM CURRENT USE OF INSULIN (HCC): ICD-10-CM

## 2024-05-06 DIAGNOSIS — R80.1 PERSISTENT PROTEINURIA: ICD-10-CM

## 2024-05-06 RX ORDER — EMPAGLIFLOZIN 10 MG/1
10 TABLET, FILM COATED ORAL EVERY MORNING
Qty: 30 TABLET | Refills: 5 | Status: SHIPPED | OUTPATIENT
Start: 2024-05-06

## 2024-05-09 LAB
LEFT EYE DIABETIC RETINOPATHY: NORMAL
RIGHT EYE DIABETIC RETINOPATHY: NORMAL

## 2024-07-05 ENCOUNTER — TELEPHONE (OUTPATIENT)
Dept: NEPHROLOGY | Facility: CLINIC | Age: 69
End: 2024-07-05

## 2024-07-05 DIAGNOSIS — N18.32 STAGE 3B CHRONIC KIDNEY DISEASE (HCC): Primary | ICD-10-CM

## 2024-07-05 NOTE — TELEPHONE ENCOUNTER
I called and spoke with Peace. She is aware of labs to be completed prior to her upcoming appointment on July 23rd 2024. Lab order faxed to [facility].

## 2024-07-08 ENCOUNTER — HOSPITAL ENCOUNTER (OUTPATIENT)
Dept: MAMMOGRAPHY | Facility: HOSPITAL | Age: 69
Discharge: HOME | End: 2024-07-08
Payer: COMMERCIAL

## 2024-07-08 VITALS — WEIGHT: 273 LBS | BODY MASS INDEX: 41.37 KG/M2 | HEIGHT: 68 IN

## 2024-07-08 DIAGNOSIS — Z12.31 BREAST CANCER SCREENING BY MAMMOGRAM: ICD-10-CM

## 2024-07-08 PROCEDURE — 77067 SCR MAMMO BI INCL CAD: CPT

## 2024-07-08 PROCEDURE — 77063 BREAST TOMOSYNTHESIS BI: CPT

## 2024-07-22 LAB
25(OH)D3+25(OH)D2 SERPL-MCNC: 38 NG/ML (ref 30–100)
ALBUMIN/CREAT UR: 40.4
ANION GAP SERPL CALCULATED.3IONS-SCNC: 10 MMOL/L (ref 3–11)
BUN SERPL-MCNC: 23 MG/DL (ref 7–25)
CALCIUM SERPL-MCNC: 9 MG/DL (ref 8.5–10.1)
CHLORIDE SERPL-SCNC: 102 MMOL/L (ref 100–109)
CO2 SERPL-SCNC: 26 MMOL/L (ref 21–31)
CREAT SERPL-MCNC: 1.35 MG/DL (ref 0.4–1.1)
CREAT UR-MCNC: 19.8 MG/DL (ref 50–200)
CYTOLOGY CMNT CVX/VAG CYTO-IMP: ABNORMAL
GFR/BSA.PRED SERPLBLD CYS-BASED-ARV: 42 ML/MIN/{1.73_M2}
GLUCOSE SERPL-MCNC: 94 MG/DL (ref 65–99)
GLUCOSE UR QL STRIP: ABNORMAL MG/DL
HGB UR QL STRIP: NEGATIVE MG/DL
KETONES UR QL STRIP: NEGATIVE MG/DL
LEUKOCYTE ESTERASE UR QL STRIP: 25 /UL
MAGNESIUM SERPL-MCNC: 2 MG/DL (ref 1.4–2.2)
MICROALBUMIN UR-MCNC: 0.8 MG/DL
NITRITE UR QL STRIP: NEGATIVE
PH UR: 7 [PH] (ref 4.5–8)
POTASSIUM SERPL-SCNC: 4.7 MMOL/L (ref 3.5–5.2)
PROT 24H UR-MRATE: NEGATIVE MG/DL
RBC #/AREA URNS HPF: ABNORMAL /HPF (ref 0–2)
SL AMB POCT URINE COMMENT: ABNORMAL
SODIUM SERPL-SCNC: 138 MMOL/L (ref 135–145)
SP GR UR: 1 (ref 1–1.03)
SQUAMOUS #/AREA URNS HPF: ABNORMAL /LPF (ref 0–5)
TRANS CELLS #/AREA URNS HPF: ABNORMAL /LPF (ref 0–1)
WBC #/AREA URNS HPF: ABNORMAL /HPF (ref 0–5)

## 2024-07-23 ENCOUNTER — OFFICE VISIT (OUTPATIENT)
Dept: NEPHROLOGY | Facility: CLINIC | Age: 69
End: 2024-07-23
Payer: COMMERCIAL

## 2024-07-23 VITALS
DIASTOLIC BLOOD PRESSURE: 86 MMHG | HEART RATE: 81 BPM | HEIGHT: 68 IN | OXYGEN SATURATION: 98 % | WEIGHT: 293 LBS | SYSTOLIC BLOOD PRESSURE: 124 MMHG | TEMPERATURE: 97.5 F | BODY MASS INDEX: 44.41 KG/M2

## 2024-07-23 DIAGNOSIS — N25.81 SECONDARY HYPERPARATHYROIDISM OF RENAL ORIGIN (HCC): ICD-10-CM

## 2024-07-23 DIAGNOSIS — N18.32 STAGE 3B CHRONIC KIDNEY DISEASE (HCC): Primary | ICD-10-CM

## 2024-07-23 DIAGNOSIS — E55.9 VITAMIN D DEFICIENCY: ICD-10-CM

## 2024-07-23 LAB — ALDOST SERPL-MCNC: 15.2 NG/DL

## 2024-07-23 PROCEDURE — 99214 OFFICE O/P EST MOD 30 MIN: CPT | Performed by: NURSE PRACTITIONER

## 2024-07-23 NOTE — PATIENT INSTRUCTIONS
It was nice to see you today. Acute kidney injury is resolved. Your blood pressure is under appropriate control. No changes today    Let me know when your Jardiance gets in the donut whole. If you need to reduce it or hold it let me know please.

## 2024-07-23 NOTE — PROGRESS NOTES
Nephrology   Office Follow-Up  Sharon Schoenberger 69 y.o. female MRN: [id number]    Encounter: [id number]        Assessment & Plan    Sharon Schoenberger was seen in the [place] office today. All diagnoses and orders for visit:     Acute kidney injury  Resolved. Peak creatinine 1.7 mg/dL. Potential vasomotor dysfunction from polypharmacy. Held HCTZ last visit and improved.   Stage 3b chronic kidney disease  Baseline creatinine 1.1-1.3 mg/dL.  Most recent creatinine 1.35 mg/dL. Etiology presumed HTN nephrosclerosis, DKD, obesity related. Repeat ultrasound due for 2024.   Hypertension in setting of CKD 3b  Home BP log reviewed all appropriate. BP cuff correlates. No changes indicated at present   Bilateral lower extremity edema  Controlled on torsemide prn and Jardiance 10 mg daily  Type 2 diabetes mellitus  A1C excellent 5.2%. grade a2 albuminuria. No change to lisinopril/SGLT2i  Obesity   Recommend ongoing attempts at weight loss  Renal cyst   Ultrasound scheduled for 2024     HPI: Sharon Schoenberger is a 69 y.o. female who is here for scheduled follow-up     Pertinent problems include CKD 3b, HTN, obesity, T2DM proteinuria, obesity, a-fib not on a/c (Declines see PCP note)    I last saw patient in March and held HCTZ due to JUANJO. JUANJO is now resolved.    Using torsemide as needed.     Kidney function improved back to typical baseline. Does have edema to legs but needs to take torsemide today. Blood pressure log reviewed and appropriate. Continue current medications. Would not resume HCTZ. Repeat labs in 4 months      Blood pressure lo/75  124/84  113/63  124/77  133/82  121/72  118/69  108/63      ROS:   Review of Systems   Constitutional:  Negative for chills and fever.   HENT:  Negative for ear pain and sore throat.    Eyes:  Negative for pain and visual disturbance.   Respiratory:  Negative for cough and shortness of breath.    Cardiovascular:  Positive for leg swelling. Negative for  chest pain and palpitations.   Gastrointestinal:  Negative for abdominal pain and vomiting.   Genitourinary:  Negative for dysuria and hematuria.   Musculoskeletal:  Negative for arthralgias and back pain.   Skin:  Negative for color change and rash.   Neurological:  Negative for seizures and syncope.   All other systems reviewed and are negative.      Allergies: Seasonal ic  [cholestatin]    Medications:   Current Outpatient Medications:     amLODIPine (NORVASC) 5 mg tablet, TAKE 1 TABLET BY MOUTH EVERY DAY, Disp: 90 tablet, Rfl: 1    aspirin 81 MG tablet, Take 1 tablet by mouth daily, Disp: , Rfl:     CINNAMON PO, Take 1 capsule by mouth daily, Disp: , Rfl:     Coenzyme Q-10 200 MG CAPS, Take 1 capsule by mouth, Disp: , Rfl:     Cranberry 500 MG CAPS, Take 1 capsule by mouth daily, Disp: , Rfl:     Empagliflozin (Jardiance) 10 MG TABS tablet, TAKE 1 TABLET (10 MG TOTAL) BY MOUTH EVERY MORNING., Disp: 30 tablet, Rfl: 5    lisinopril (ZESTRIL) 10 mg tablet, Take 1 tablet (10 mg total) by mouth daily, Disp: 90 tablet, Rfl: 1    metoprolol tartrate (LOPRESSOR) 50 mg tablet, TAKE 1 TABLET BY MOUTH TWICE A DAY, Disp: 180 tablet, Rfl: 1    Multiple Vitamins-Minerals (DAILY MULTIPLE VITAMINS/MIN PO), Take 1 tablet by mouth daily, Disp: , Rfl:     Omega-3 Fatty Acids (FISH OIL) 1,000 mg, Take 1 capsule by mouth daily, Disp: , Rfl:     Protein (NUTRA/PRO CHOCOLATE PO), Take by mouth, Disp: , Rfl:     torsemide (DEMADEX) 10 mg tablet, Take 1 tablet (10 mg total) by mouth if needed (take as needed for swelling - no more than 1 a day unless directed by nephrologist), Disp: 20 tablet, Rfl: 1    atorvastatin (LIPITOR) 10 mg tablet, Take 1 tablet (10 mg total) by mouth daily, Disp: 90 tablet, Rfl: 1    Cholecalciferol (VITAMIN D3) 2000 units capsule, Take 1 capsule by mouth daily, Disp: , Rfl:     Past Medical History:   Diagnosis Date    Elevated fasting glucose 8/8/2019    Hypokalemia     Last Assessed: 8/12/2016      "Hyponatremia     Last Assessed: 8/12/2016     Irregular heartbeat     A Flutter 7/2015 while in hospital for pneumonia, resolved     Pneumonia     Vitamin D deficiency 3/15/2022     History reviewed. No pertinent surgical history.  Family History   Problem Relation Age of Onset    Colon cancer Mother     Tongue cancer Father     Hypertension Father     Other Sister         Epilepsy     Hypertension Sister     No Known Problems Sister     No Known Problems Maternal Grandmother     No Known Problems Maternal Grandfather     Breast cancer Paternal Grandmother 85    Colon cancer Brother     Lung cancer Brother         secondary     Hypertension Brother     Breast cancer Maternal Aunt     No Known Problems Maternal Aunt     No Known Problems Paternal Aunt     Cancer Neg Hx     BRCA2 Positive Neg Hx     BRCA2 Negative Neg Hx     BRCA1 Positive Neg Hx     BRCA1 Negative Neg Hx     Ovarian cancer Neg Hx     BRCA 1/2 Neg Hx     Endometrial cancer Neg Hx     Breast cancer additional onset Neg Hx       reports that she has never smoked. She has never used smokeless tobacco. She reports that she does not drink alcohol and does not use drugs.      Physical Exam:   Vitals:    07/23/24 0935   BP: 124/86   BP Location: Left arm   Patient Position: Sitting   Cuff Size: Large   Pulse: 81   Temp: 97.5 °F (36.4 °C)   SpO2: 98%   Weight: 134 kg (296 lb)   Height: 5' 8\" (1.727 m)     Body mass index is 45.01 kg/m².    General: conscious, cooperative, in no acute distress, appears stated age  Eyes: conjunctivae pale, anicteric sclerae  ENT: lips and mucous membranes moist  Neck: supple, no JVD, no masses  Chest:  essentially clear breath sounds bilaterally, no crackles, ronchus or wheezings  CVS: S1 & S2, normal rate, irregular rhythm  Abdomen: soft, non-tender, non-distended, normoactive bowel sounds, rounded obese  Extremities: moderate edema of both legs  Skin: no rash   Neuro: awake, alert, oriented       Diagnostic Data:  Lab: I have " "personally reviewed pertinent lab results.,   CBC:       CMP:   Lab Results   Component Value Date    SODIUM 138 07/22/2024    K 4.7 07/22/2024     07/22/2024    CO2 26 07/22/2024    BUN 23 07/22/2024    CREATININE 1.35 (H) 07/22/2024    CALCIUM 9.0 07/22/2024    EGFR 42 (L) 07/22/2024   ,   PT/INR: No results found for: \"PT\", \"INR\",   Magnesium: No components found for: \"MAG\",  Phosphorous: No results found for: \"PHOS\"    Patient Instructions   It was nice to see you today. Acute kidney injury is resolved. Your blood pressure is under appropriate control. No changes today    Let me know when your Jardiance gets in the donut whole. If you need to reduce it or hold it let me know please.       Portions of the record may have been created with voice recognition software. Occasional wrong word or \"sound a like\" substitutions may have occurred due to the inherent limitations of voice recognition software. Read the chart carefully and recognize, using context, where substitutions have occurred.    If you have any questions, please contact the dictating provider.  "

## 2024-07-27 DIAGNOSIS — R60.0 BILATERAL LOWER EXTREMITY EDEMA: ICD-10-CM

## 2024-07-28 RX ORDER — TORSEMIDE 10 MG/1
10 TABLET ORAL AS NEEDED
Qty: 20 TABLET | Refills: 3 | Status: SHIPPED | OUTPATIENT
Start: 2024-07-28

## 2024-07-29 ENCOUNTER — TELEPHONE (OUTPATIENT)
Age: 69
End: 2024-07-29

## 2024-07-29 NOTE — TELEPHONE ENCOUNTER
Patient called requesting refill for torsemide (DEMADEX) 10 mg tablet . Patient made aware medication was refilled on 7/28 for 20 tab with 3 refills to [facility] pharmacy. Patient instructed to contact the pharmacy to obtain refills of medication. Patient verbalized understanding.

## 2024-09-12 DIAGNOSIS — I10 ESSENTIAL HYPERTENSION: ICD-10-CM

## 2024-09-12 RX ORDER — LISINOPRIL 10 MG/1
10 TABLET ORAL DAILY
Qty: 90 TABLET | Refills: 1 | Status: SHIPPED | OUTPATIENT
Start: 2024-09-12

## 2024-10-23 DIAGNOSIS — E11.69 TYPE 2 DIABETES MELLITUS WITH MORBID OBESITY (HCC): ICD-10-CM

## 2024-10-23 DIAGNOSIS — I10 ESSENTIAL HYPERTENSION: ICD-10-CM

## 2024-10-23 DIAGNOSIS — E66.01 TYPE 2 DIABETES MELLITUS WITH MORBID OBESITY (HCC): ICD-10-CM

## 2024-10-23 RX ORDER — METOPROLOL TARTRATE 50 MG
TABLET ORAL
Qty: 180 TABLET | Refills: 0 | Status: SHIPPED | OUTPATIENT
Start: 2024-10-23

## 2024-10-23 RX ORDER — AMLODIPINE BESYLATE 5 MG/1
TABLET ORAL
Qty: 90 TABLET | Refills: 0 | Status: SHIPPED | OUTPATIENT
Start: 2024-10-23

## 2024-10-23 RX ORDER — ATORVASTATIN CALCIUM 10 MG/1
10 TABLET, FILM COATED ORAL DAILY
Qty: 30 TABLET | Refills: 0 | Status: SHIPPED | OUTPATIENT
Start: 2024-10-23

## 2024-11-04 ENCOUNTER — OFFICE VISIT (OUTPATIENT)
Dept: FAMILY MEDICINE CLINIC | Facility: CLINIC | Age: 69
End: 2024-11-04
Payer: COMMERCIAL

## 2024-11-04 VITALS
SYSTOLIC BLOOD PRESSURE: 130 MMHG | WEIGHT: 293 LBS | RESPIRATION RATE: 18 BRPM | HEART RATE: 92 BPM | BODY MASS INDEX: 44.41 KG/M2 | HEIGHT: 68 IN | TEMPERATURE: 98.4 F | DIASTOLIC BLOOD PRESSURE: 86 MMHG | OXYGEN SATURATION: 99 %

## 2024-11-04 DIAGNOSIS — E66.01 TYPE 2 DIABETES MELLITUS WITH MORBID OBESITY (HCC): ICD-10-CM

## 2024-11-04 DIAGNOSIS — I48.11 LONGSTANDING PERSISTENT ATRIAL FIBRILLATION (HCC): ICD-10-CM

## 2024-11-04 DIAGNOSIS — E11.69 TYPE 2 DIABETES MELLITUS WITH MORBID OBESITY (HCC): ICD-10-CM

## 2024-11-04 DIAGNOSIS — Z23 ENCOUNTER FOR IMMUNIZATION: ICD-10-CM

## 2024-11-04 DIAGNOSIS — Z00.00 MEDICARE ANNUAL WELLNESS VISIT, SUBSEQUENT: Primary | ICD-10-CM

## 2024-11-04 DIAGNOSIS — I10 ESSENTIAL HYPERTENSION: ICD-10-CM

## 2024-11-04 DIAGNOSIS — N39.41 URGE URINARY INCONTINENCE: ICD-10-CM

## 2024-11-04 DIAGNOSIS — Z53.20 REFUSAL OF ANTICOAGULANT MEDICATION BY PATIENT: ICD-10-CM

## 2024-11-04 DIAGNOSIS — E11.9 ENCOUNTER FOR DIABETIC FOOT EXAM (HCC): ICD-10-CM

## 2024-11-04 PROCEDURE — 83036 HEMOGLOBIN GLYCOSYLATED A1C: CPT | Performed by: FAMILY MEDICINE

## 2024-11-04 PROCEDURE — G0008 ADMIN INFLUENZA VIRUS VAC: HCPCS | Performed by: FAMILY MEDICINE

## 2024-11-04 PROCEDURE — 90662 IIV NO PRSV INCREASED AG IM: CPT | Performed by: FAMILY MEDICINE

## 2024-11-04 PROCEDURE — 99214 OFFICE O/P EST MOD 30 MIN: CPT | Performed by: FAMILY MEDICINE

## 2024-11-04 PROCEDURE — G0439 PPPS, SUBSEQ VISIT: HCPCS | Performed by: FAMILY MEDICINE

## 2024-11-04 RX ORDER — TORSEMIDE 10 MG/1
10 TABLET ORAL AS NEEDED
Qty: 20 TABLET | Refills: 3 | Status: CANCELLED | OUTPATIENT
Start: 2024-11-04

## 2024-11-04 NOTE — PATIENT INSTRUCTIONS
Medicare Preventive Visit Patient Instructions  Thank you for completing your Welcome to Medicare Visit or Medicare Annual Wellness Visit today. Your next wellness visit will be due in one year (11/5/2025).  The screening/preventive services that you may require over the next 5-10 years are detailed below. Some tests may not apply to you based off risk factors and/or age. Screening tests ordered at today's visit but not completed yet may show as past due. Also, please note that scanned in results may not display below.  Preventive Screenings:  Service Recommendations Previous Testing/Comments   Colorectal Cancer Screening  * Colonoscopy    * Fecal Occult Blood Test (FOBT)/Fecal Immunochemical Test (FIT)  * Fecal DNA/Cologuard Test  * Flexible Sigmoidoscopy Age: 45-75 years old   Colonoscopy: every 10 years (may be performed more frequently if at higher risk)  OR  FOBT/FIT: every 1 year  OR  Cologuard: every 3 years  OR  Sigmoidoscopy: every 5 years  Screening may be recommended earlier than age 45 if at higher risk for colorectal cancer. Also, an individualized decision between you and your healthcare provider will decide whether screening between the ages of 76-85 would be appropriate. Colonoscopy: Not on file  FOBT/FIT: Not on file  Cologuard: 12/13/2023  Sigmoidoscopy: Not on file    Screening Current     Breast Cancer Screening Age: 40+ years old  Frequency: every 1-2 years  Not required if history of left and right mastectomy Mammogram: 07/08/2024    Screening Current   Cervical Cancer Screening Between the ages of 21-29, pap smear recommended once every 3 years.   Between the ages of 30-65, can perform pap smear with HPV co-testing every 5 years.   Recommendations may differ for women with a history of total hysterectomy, cervical cancer, or abnormal pap smears in past. Pap Smear: Not on file    Screening Not Indicated   Hepatitis C Screening Once for adults born between 1945 and 1965  More frequently in  patients at high risk for Hepatitis C Hep C Antibody: 10/31/2020    Screening Current   Diabetes Screening 1-2 times per year if you're at risk for diabetes or have pre-diabetes Fasting glucose: 102 mg/dL (12/8/2023)  A1C: 5.2 (4/30/2024)  Screening Not Indicated  History Diabetes   Cholesterol Screening Once every 5 years if you don't have a lipid disorder. May order more often based on risk factors. Lipid panel: 06/02/2023    Screening Current     Other Preventive Screenings Covered by Medicare:  Abdominal Aortic Aneurysm (AAA) Screening: covered once if your at risk. You're considered to be at risk if you have a family history of AAA.  Lung Cancer Screening: covers low dose CT scan once per year if you meet all of the following conditions: (1) Age 55-77; (2) No signs or symptoms of lung cancer; (3) Current smoker or have quit smoking within the last 15 years; (4) You have a tobacco smoking history of at least 20 pack years (packs per day multiplied by number of years you smoked); (5) You get a written order from a healthcare provider.  Glaucoma Screening: covered annually if you're considered high risk: (1) You have diabetes OR (2) Family history of glaucoma OR (3)  aged 50 and older OR (4)  American aged 65 and older  Osteoporosis Screening: covered every 2 years if you meet one of the following conditions: (1) You're estrogen deficient and at risk for osteoporosis based off medical history and other findings; (2) Have a vertebral abnormality; (3) On glucocorticoid therapy for more than 3 months; (4) Have primary hyperparathyroidism; (5) On osteoporosis medications and need to assess response to drug therapy.   Last bone density test (DXA Scan): 04/06/2023.  HIV Screening: covered annually if you're between the age of 15-65. Also covered annually if you are younger than 15 and older than 65 with risk factors for HIV infection. For pregnant patients, it is covered up to 3 times per  pregnancy.    Immunizations:  Immunization Recommendations   Influenza Vaccine Annual influenza vaccination during flu season is recommended for all persons aged >= 6 months who do not have contraindications   Pneumococcal Vaccine   * Pneumococcal conjugate vaccine = PCV13 (Prevnar 13), PCV15 (Vaxneuvance), PCV20 (Prevnar 20)  * Pneumococcal polysaccharide vaccine = PPSV23 (Pneumovax) Adults 19-65 yo with certain risk factors or if 65+ yo  If never received any pneumonia vaccine: recommend Prevnar 20 (PCV20)  Give PCV20 if previously received 1 dose of PCV13 or PPSV23   Hepatitis B Vaccine 3 dose series if at intermediate or high risk (ex: diabetes, end stage renal disease, liver disease)   Respiratory syncytial virus (RSV) Vaccine - COVERED BY MEDICARE PART D  * RSVPreF3 (Arexvy) CDC recommends that adults 60 years of age and older may receive a single dose of RSV vaccine using shared clinical decision-making (SCDM)   Tetanus (Td) Vaccine - COST NOT COVERED BY MEDICARE PART B Following completion of primary series, a booster dose should be given every 10 years to maintain immunity against tetanus. Td may also be given as tetanus wound prophylaxis.   Tdap Vaccine - COST NOT COVERED BY MEDICARE PART B Recommended at least once for all adults. For pregnant patients, recommended with each pregnancy.   Shingles Vaccine (Shingrix) - COST NOT COVERED BY MEDICARE PART B  2 shot series recommended in those 19 years and older who have or will have weakened immune systems or those 50 years and older     Health Maintenance Due:      Topic Date Due   • Breast Cancer Screening: Mammogram  07/08/2025   • Colorectal Cancer Screening  12/13/2026   • Hepatitis C Screening  Completed     Immunizations Due:      Topic Date Due   • Influenza Vaccine (1) 09/01/2024     Advance Directives   What are advance directives?  Advance directives are legal documents that state your wishes and plans for medical care. These plans are made ahead of  time in case you lose your ability to make decisions for yourself. Advance directives can apply to any medical decision, such as the treatments you want, and if you want to donate organs.   What are the types of advance directives?  There are many types of advance directives, and each state has rules about how to use them. You may choose a combination of any of the following:  Living will:  This is a written record of the treatment you want. You can also choose which treatments you do not want, which to limit, and which to stop at a certain time. This includes surgery, medicine, IV fluid, and tube feedings.   Durable power of  for healthcare (DPAHC):  This is a written record that states who you want to make healthcare choices for you when you are unable to make them for yourself. This person, called a proxy, is usually a family member or a friend. You may choose more than 1 proxy.  Do not resuscitate (DNR) order:  A DNR order is used in case your heart stops beating or you stop breathing. It is a request not to have certain forms of treatment, such as CPR. A DNR order may be included in other types of advance directives.  Medical directive:  This covers the care that you want if you are in a coma, near death, or unable to make decisions for yourself. You can list the treatments you want for each condition. Treatment may include pain medicine, surgery, blood transfusions, dialysis, IV or tube feedings, and a ventilator (breathing machine).  Values history:  This document has questions about your views, beliefs, and how you feel and think about life. This information can help others choose the care that you would choose.  Why are advance directives important?  An advance directive helps you control your care. Although spoken wishes may be used, it is better to have your wishes written down. Spoken wishes can be misunderstood, or not followed. Treatments may be given even if you do not want them. An advance  directive may make it easier for your family to make difficult choices about your care.   Urinary Incontinence   Urinary incontinence (UI)  is when you lose control of your bladder. UI develops because your bladder cannot store or empty urine properly. The 3 most common types of UI are stress incontinence, urge incontinence, or both.  Medicines:   May be given to help strengthen your bladder control. Report any side effects of medication to your healthcare provider.  Do pelvic muscle exercises often:  Your pelvic muscles help you stop urinating. Squeeze these muscles tight for 5 seconds, then relax for 5 seconds. Gradually work up to squeezing for 10 seconds. Do 3 sets of 15 repetitions a day, or as directed. This will help strengthen your pelvic muscles and improve bladder control.  Train your bladder:  Go to the bathroom at set times, such as every 2 hours, even if you do not feel the urge to go. You can also try to hold your urine when you feel the urge to go. For example, hold your urine for 5 minutes when you feel the urge to go. As that becomes easier, hold your urine for 10 minutes.   Self-care:   Keep a UI record.  Write down how often you leak urine and how much you leak. Make a note of what you were doing when you leaked urine.  Drink liquids as directed. You may need to limit the amount of liquid you drink to help control your urine leakage. Do not drink any liquid right before you go to bed. Limit or do not have drinks that contain caffeine or alcohol.   Prevent constipation.  Eat a variety of high-fiber foods. Good examples are high-fiber cereals, beans, vegetables, and whole-grain breads. Walking is the best way to trigger your intestines to have a bowel movement.  Exercise regularly and maintain a healthy weight.  Weight loss and exercise will decrease pressure on your bladder and help you control your leakage.   Use a catheter as directed  to help empty your bladder. A catheter is a tiny, plastic  tube that is put into your bladder to drain your urine.   Go to behavior therapy as directed.  Behavior therapy may be used to help you learn to control your urge to urinate.    Weight Management   Why it is important to manage your weight:  Being overweight increases your risk of health conditions such as heart disease, high blood pressure, type 2 diabetes, and certain types of cancer. It can also increase your risk for osteoarthritis, sleep apnea, and other respiratory problems. Aim for a slow, steady weight loss. Even a small amount of weight loss can lower your risk of health problems.  How to lose weight safely:  A safe and healthy way to lose weight is to eat fewer calories and get regular exercise. You can lose up about 1 pound a week by decreasing the number of calories you eat by 500 calories each day.   Healthy meal plan for weight management:  A healthy meal plan includes a variety of foods, contains fewer calories, and helps you stay healthy. A healthy meal plan includes the following:  Eat whole-grain foods more often.  A healthy meal plan should contain fiber. Fiber is the part of grains, fruits, and vegetables that is not broken down by your body. Whole-grain foods are healthy and provide extra fiber in your diet. Some examples of whole-grain foods are whole-wheat breads and pastas, oatmeal, brown rice, and bulgur.  Eat a variety of vegetables every day.  Include dark, leafy greens such as spinach, kale, anastasia greens, and mustard greens. Eat yellow and orange vegetables such as carrots, sweet potatoes, and winter squash.   Eat a variety of fruits every day.  Choose fresh or canned fruit (canned in its own juice or light syrup) instead of juice. Fruit juice has very little or no fiber.  Eat low-fat dairy foods.  Drink fat-free (skim) milk or 1% milk. Eat fat-free yogurt and low-fat cottage cheese. Try low-fat cheeses such as mozzarella and other reduced-fat cheeses.  Choose meat and other protein  "foods that are low in fat.  Choose beans or other legumes such as split peas or lentils. Choose fish, skinless poultry (chicken or turkey), or lean cuts of red meat (beef or pork). Before you cook meat or poultry, cut off any visible fat.   Use less fat and oil.  Try baking foods instead of frying them. Add less fat, such as margarine, sour cream, regular salad dressing and mayonnaise to foods. Eat fewer high-fat foods. Some examples of high-fat foods include french fries, doughnuts, ice cream, and cakes.  Eat fewer sweets.  Limit foods and drinks that are high in sugar. This includes candy, cookies, regular soda, and sweetened drinks.  Exercise:  Exercise at least 30 minutes per day on most days of the week. Some examples of exercise include walking, biking, dancing, and swimming. You can also fit in more physical activity by taking the stairs instead of the elevator or parking farther away from stores. Ask your healthcare provider about the best exercise plan for you.      © Copyright [employer] 2018 Information is for End User's use only and may not be sold, redistributed or otherwise used for commercial purposes. All illustrations and images included in CareNotes® are the copyrighted property of [employer] or [employer]      Patient Education     Urinary incontinence in females   The Basics   Written by the doctors and editors at [facility], [place]   What is urinary incontinence? -- Urinary incontinence is the medical term for when a person leaks urine or loses bladder control.  Incontinence is a very common problem, but it is not a normal part of aging. If you have this problem, there are treatments that can help. There are also things that you can do on your own to stop or reduce urine leakage so you don't have to \"just live with it.\"  What are the symptoms of incontinence? -- There are different types of incontinence. Each causes different symptoms. The 3 most common types are:   Stress incontinence - " "With stress incontinence, you leak urine when you laugh, cough, sneeze, or do anything that \"stresses\" the belly. Stress incontinence is most common in females, especially those who have had a baby.   Urgency incontinence - With urgency incontinence, you feel a strong need to urinate all of a sudden. This is also known as \"urge incontinence.\" Often, the \"urge\" is so strong that you can't make it to the bathroom in time. \"Overactive bladder\" is another term for having a sudden, frequent urge to urinate. People with overactive bladder might or might not actually leak urine.   Mixed incontinence - With mixed incontinence, you have symptoms of both stress and urgency incontinence.  Is there anything I can do on my own to feel better? -- Yes. Here are some things that can help reduce urine leaks:   Reduce the amount of liquid that you drink, especially a few hours before bed.   Cut down on any foods or drinks that make your symptoms worse. Some people find that alcohol, caffeine, or spicy or acidic foods irritate the bladder.   Try to lose weight, if you are overweight. Your doctor or nurse can help you do this in a healthy way.   If you have diabetes, keep your blood sugar as close to your goal level as possible.   If you take medicines called diuretics, plan ahead. These medicines increase the need to urinate. Try to take them when you know you will be near a bathroom for a few hours. If you keep having problems with leakage because of diuretics, ask your doctor if you can take a lower dose or switch to a different medicine.  These techniques can also help improve bladder control:   Bladder retraining - During bladder retraining, you go to the bathroom at scheduled times. For instance, you might decide that you will go every hour. Make yourself go every hour, even if you don't feel like you need to. Try to wait the whole hour, even if you need to go sooner. Then, once you get used to going every hour, increase the " "amount of time you wait in between bathroom visits. Over time, you might be able to \"retrain\" your bladder to wait 3 or 4 hours between bathroom visits.   Pelvic floor muscle training - This involves learning exercises to strengthen and relax your pelvic muscles. These include the muscles that control the flow of urine and bowel movements. When done right, these exercises can help. But people often do them wrong. Ask your doctor or nurse how to do them right. Your doctor might suggest working with a physical therapist who has special training in these exercises.  Should I see my doctor or nurse? -- Yes. Your doctor or nurse can find out what might be causing your incontinence. They can also suggest ways to relieve the problem.  When you speak to your doctor or nurse, ask if any of the medicines you take could be causing your symptoms. Some medicines can cause incontinence or make it worse.  Some people choose to wear pads or special underwear. These can help if you accidentally leak urine once in a while. But they can also cause skin irritation if you use them a lot. If you have incontinence, ask your doctor or nurse how to treat it.  How is incontinence treated? -- The treatment options differ depending on what type of incontinence you have. Some of the options include:   Medicines to relax the bladder   Surgery to repair the tissues that support the bladder or to improve the flow of urine   Electrical stimulation of the nerves that relax the bladder  Urinary incontinence is more common in people who have been through menopause. (Menopause is when you stop having monthly periods). Some people have vaginal dryness after menopause. If this is the case for you, a treatment called vaginal estrogen might help.  What will my life be like? -- Many people with incontinence can regain bladder control or at least reduce the amount of leakage they have. The most important thing is to tell your doctor or nurse. Then, work with " them to find an approach that helps you.  All topics are updated as new evidence becomes available and our peer review process is complete.  This topic retrieved from [employer] on: Feb 26, 2024.  Topic 18023 Version 18.0  Release: 32.2.4 - C32.56  © 2024 UpToDate, Inc. and/or its affiliates. All rights reserved.  figure 1: Location of the bladder     This drawing shows the side view of a woman's body. The bladder is in front of the vagina. The urethra is the tube that carries urine from the bladder out of the body.  Graphic 783927 Version 1.0  Consumer Information Use and Disclaimer   Disclaimer: This generalized information is a limited summary of diagnosis, treatment, and/or medication information. It is not meant to be comprehensive and should be used as a tool to help the user understand and/or assess potential diagnostic and treatment options. It does NOT include all information about conditions, treatments, medications, side effects, or risks that may apply to a specific patient. It is not intended to be medical advice or a substitute for the medical advice, diagnosis, or treatment of a health care provider based on the health care provider's examination and assessment of a patient's specific and unique circumstances. Patients must speak with a health care provider for complete information about their health, medical questions, and treatment options, including any risks or benefits regarding use of medications. This information does not endorse any treatments or medications as safe, effective, or approved for treating a specific patient. UpToDate, Inc. and its affiliates disclaim any warranty or liability relating to this information or the use thereof.The use of this information is governed by the Terms of Use, available at https://www.wolterskluwer.com/en/know/clinical-effectiveness-terms. 2024© UpToDate, Inc. and its affiliates and/or licensors. All rights reserved.  Copyright   © 2024 UpToDate, Inc. and/or  "its affiliates. All rights reserved.    Patient Education     Pelvic floor muscle exercises   The Basics   Written by the doctors and editors at [facility]   What is the pelvic floor? -- The \"pelvic floor\" is the name for the muscles that support the organs in the pelvis. These organs include the bladder and rectum. In the female pelvis, they also include the uterus (figure 1).  What are pelvic floor muscle exercises? -- These are exercises that can make your pelvic floor muscles stronger. They involve learning ways to tighten and relax specific muscles.  Pelvic floor muscle exercises can help keep you from leaking urine, gas, or bowel movements. They can also help with a condition called \"pelvic organ prolapse.\" This is when the organs in the lower belly drop down and press against or bulge into the vagina.  One way to strengthen your pelvic floor muscles is to do exercises. These are also known as \"Kegel\" exercises.  How do I do pelvic floor muscle exercises? -- If you want to try pelvic floor muscle exercises, start by talking to your doctor or nurse. They can talk to you about whether these exercises can help you. They can also teach you how to do them correctly.  You will need to learn which muscles to tighten and relax. It is sometimes hard to figure out the right muscles.  Some ways you can practice:   People with female or male anatomy - Squeeze the muscles you would use to avoid passing gas.   People with female anatomy - Put a finger inside your vagina and squeeze the muscles around your finger. Or you can imagine that you are sitting on a marble and have to pick it up using your vagina.   People with male anatomy - Squeeze the muscles that control the flow of urine. These exercises might help reduce urine leaks in people who have had surgery to treat prostate cancer or an enlarged prostate.  No matter how you learn to do pelvic floor muscle exercises, know that the muscles involved are not in your belly, " "thighs, or buttocks.  After you learn which muscles to tighten and relax, you can do the exercises in any position (standing, sitting, or lying down).  Should I see a physical therapist? -- Your doctor or nurse might suggest working with a physical therapist who has special training in pelvic floor issues. They can check your muscle strength and teach you specific exercises.  How often should I do the exercises? -- A common approach is to try to do a set of the exercises 3 times a day.  For each set, do the following about 10 times:   Squeeze your pelvic muscles.   Hold the muscles tight for about 10 seconds.   Relax the muscles completely.  Keep up this routine for at least a few months. You will probably notice results, but it might take a few weeks to several months.  How do pelvic floor muscle exercises help? -- Pelvic floor muscle exercises can help:   Prevent urine leaks in people who have \"stress incontinence\" - This means that they leak urine when they cough, laugh, sneeze, or strain.   Control sudden urges to urinate - These happen to people with \"urinary urgency\" or \"urge incontinence.\"   Control the release of gas or bowel movements   Improve symptoms caused by pelvic organ prolapse - These can include pressure or bulging in the vagina. If you have these symptoms, see your doctor or nurse to find out the cause.  It might take a few months of doing the exercises regularly before you notice them working. If you have been doing pelvic floor muscle exercises for several months and they don't seem to be making a difference, tell your doctor or nurse. They might suggest seeing a physical therapist or trying other treatments for your condition.  All topics are updated as new evidence becomes available and our peer review process is complete.  This topic retrieved from [employer] on: Feb 26, 2024.  Topic 11091 Version 15.0  Release: 32.2.4 - C32.56  © 2024 UpToDate, Inc. and/or its affiliates. All rights " "reserved.  figure 1: Pelvic floor muscles     The \"pelvic floor\" is a group of muscles that support the organs in the pelvis. In females, these organs include the uterus, bladder, and rectum.  Graphic [id number] Version 2.0  Consumer Information Use and Disclaimer   Disclaimer: This generalized information is a limited summary of diagnosis, treatment, and/or medication information. It is not meant to be comprehensive and should be used as a tool to help the user understand and/or assess potential diagnostic and treatment options. It does NOT include all information about conditions, treatments, medications, side effects, or risks that may apply to a specific patient. It is not intended to be medical advice or a substitute for the medical advice, diagnosis, or treatment of a health care provider based on the health care provider's examination and assessment of a patient's specific and unique circumstances. Patients must speak with a health care provider for complete information about their health, medical questions, and treatment options, including any risks or benefits regarding use of medications. This information does not endorse any treatments or medications as safe, effective, or approved for treating a specific patient. [employer] and its affiliates disclaim any warranty or liability relating to this information or the use thereof.The use of this information is governed by the Terms of Use, available at https://www.w[employer]tersSp[employer] Kineticsuwer.com/en/know/clinical-effectiveness-terms. 2024© UpToDate, Inc. and its affiliates and/or licensors. All rights reserved.  Copyright   © 2024 UpToDate, Inc. and/or its affiliates. All rights reserved.    Patient Education     Bladder training   The Basics   Written by the doctors and editors at [employer]   What is bladder training? -- Bladder training means changing your habits to better control your bladder (figure 1). It can help with urinary problems like:   Frequency - This means " "having to use the toilet very frequently.   Urgency - This means suddenly feeling the need to urinate right away.   Leakage - This is when urgency leads to actually leaking urine. It is also called \"urge incontinence.\"  Talk to your doctor or nurse before trying bladder training on your own. They will want to make sure that you do it correctly. They might also want to check for other problems, such as a urinary tract infection.  What is a bladder diary? -- Bladder training involves trying to increase the amount of time between trips to the toilet. A \"bladder diary\" is a way to keep track of how often you go (form 1). Doctors sometimes call this a \"voiding diary.\"  In the diary, write down:   The time you urinate   The amount of urine, if your doctor asked you to measure this   If you had any leaking, how much (small, medium, or large amount), and what you were doing when the leakage happened   The amounts and types of fluids you drink   Any other symptoms you have  How do I train my bladder? -- Once you have an idea of how often you are urinating, try to wait a little longer between trips to the toilet. This should be a gradual process:   First, slightly increase the amount of time between bathroom visits. For example, if you normally go every hour, add 15 minutes. This means trying to wait 1 hour and 15 minutes between trips to the bathroom.   Keep following this schedule for several days. If you can do this for 3 or 4 days without problems, increase the time again. For example, you can add 15 more minutes between trips to the bathroom.   Keep doing this until you can wait at least 2 to 3 hours between bathroom visits. It can take time to get to this point. Some people notice improvement within a few weeks of bladder training. But it can take longer.  Keep filling out your bladder diary as you work on bladder training. This will help you see improvement over time. The process might take several weeks.  What else " "can I do to help with urgency? -- Part of bladder training involves learning to control the urge to urinate and make your bladder wait. Some things you can do to help with this:   Squeeze your pelvic muscles - These include the muscles that control the flow of urine. Try squeezing the muscles and then relaxing them. Repeat this several times.   Change positions - It might help to cross your legs or sit on a firm surface.   Distract your mind - Try to think about something else until the urge passes.   Relax - Stay still when you get the urge to urinate. It might help to do deep breathing exercises. Do not rush to the toilet when it is time to go.  What else should I know? -- These tips might help with bladder training:   Try to only urinate when you actually need to go - For example, avoid going to the bathroom before leaving home \"just in case.\" This can train your brain to think that your bladder is full when it really isn't.   Drink fluids throughout the day - Make sure that you drink enough fluids to stay hydrated. This usually means about 8 cups (64 ounces) a day. Try to spread this throughout the day instead of drinking a lot all at once. If you usually drink a lot more than this, ask your doctor or nurse if it is OK for you to reduce the amount.   Avoid drinking fluids soon before bedtime - This can lower the chances that you will need to urinate during the night.   Limit or avoid alcohol and caffeine - Some people find that these things make them need to urinate more.  When should I call the doctor? -- Call your doctor or nurse if:   Your urinary symptoms are not getting better or are getting worse.   You are having trouble with your training schedule - Bladder training can be frustrating and take time. But don't give up. Your doctor or nurse can help you.   You have other problems with urinating, such as pain or burning, not being able to urinate, or seeing blood in your urine.  All topics are updated as " "new evidence becomes available and our peer review process is complete.  This topic retrieved from [employer] on: Mar 16, 2024.  Topic 903550 Version 3.0  Release: 32.2.4 - C32.74  © 2024 UpToDate, Inc. and/or its affiliates. All rights reserved.  figure 1: Anatomy of the urinary tract     Urine is made by the kidneys. It passes from the kidneys into the bladder through 2 tubes called the ureters. Then, it leaves the bladder through another tube called the urethra.  Graphic 31975 Version 8.0  form 1: Voiding diary     To use this diary, make a note of these things every time you urinate:  The time (for example, \"10:30 AM\")  The amount of urine, if your doctor asked you to measure this  Whether you leaked any urine, and about how much (for example, small/medium/large, or whether or not you needed to change your underwear)  When you went to bed and woke up, what you had to drink, and anything else that might have caused you to urinate (for example, \"just had coffee,\" \"coughed,\" \"was running to the bathroom,\" or \"just took my water pill\")  Start the record in the morning the first time you go to the bathroom after you get up.  Graphic 398819 Version 1.0  Consumer Information Use and Disclaimer   Disclaimer: This generalized information is a limited summary of diagnosis, treatment, and/or medication information. It is not meant to be comprehensive and should be used as a tool to help the user understand and/or assess potential diagnostic and treatment options. It does NOT include all information about conditions, treatments, medications, side effects, or risks that may apply to a specific patient. It is not intended to be medical advice or a substitute for the medical advice, diagnosis, or treatment of a health care provider based on the health care provider's examination and assessment of a patient's specific and unique circumstances. Patients must speak with a health care provider for complete information about their " health, medical questions, and treatment options, including any risks or benefits regarding use of medications. This information does not endorse any treatments or medications as safe, effective, or approved for treating a specific patient. Up[employer]Date, Inc[employer] and its affiliates disclaim any warranty or liability relating to this information or the use thereof.The use of this information is governed by the Terms of Use, available at https://www.[employer].com/en/know/clinical-effectiveness-terms. 2024© UpToDate, Inc. and its affiliates and/or licensors. All rights reserved.  Copyright   © 2024 Up[employer]Date, Inc. and/or its affiliates. All rights reserved.

## 2024-11-04 NOTE — PROGRESS NOTES
=================================================================

Current Admit

=================================================================

Datetime Report Generated by CPN: 03/09/2017 18:00

   

   

=================================================================

ADMISSION INFORMATION

=================================================================

   

Current Admit Date/Time:  03/08/2017 15:40    (03/08/2017 15:43:Marii Lenz RN)

Reason for Admission:  Rupture of Membranes    (03/08/2017 15:43:Marii Lenz RN)

Chief Complaint:  Suspected Rupture of Membranes    (03/08/2017

   15:43:Marii Lenz RN)

EGA per Dates:  37.0    (03/08/2017 15:43:QS system process)

Method of Arrival:  Wheelchair    (03/08/2017 15:43:Marii Lenz RN)

Admitted From:  Home    (03/08/2017 15:43:Marii Lenz RN)

Reason for Induction:  Not Applicable    (03/08/2017 15:43:Marii Lenz RN)

Prenatal Records Available:  Yes    (03/08/2017 15:43:THAD Ch)

General Admission Information:  Reviewed; Updated    (03/08/2017

   15:43:THAD Ch)

General Admission Reviewed By:  SERENA ONEIL    (03/08/2017

   15:43:THAD Ch)

   

=================================================================

BELONGINGS/ADVANCED DIRECTIVES

=================================================================

   

Valuables/Personal Effects:  Cell Phone    (03/08/2017 15:43:THAD Ch)

Other Belongings:  see signed belongings conse    (03/08/2017

   15:43:Marii Lenz RN)

Disposition of Belongings:  Kept with Patient    (03/08/2017

   15:43:THAD Ch)

Advance Direct for Healthcare:  No, and Wants No Information   

   (03/08/2017 15:43:Marii Lenz RN)

Durable Power of :  No    (03/08/2017 15:43:Marii Lenz RN)

Living Will:  No    (03/08/2017 15:43:Marii Lenz RN)

Organ Donor:  No    (03/08/2017 15:43:Marii Lenz RN)

Pt Rights Information Given:  Yes    (03/08/2017 15:43:Marii Lenz RN)

Pt Understands Pt Rights:  Yes    (03/08/2017 15:43:Marii Lenz RN)

   

=================================================================

LEARNING ASSESSMENT

=================================================================

   

Knowledge Level:  Understands L_D Process; Understands Care Activities;

   Had Pre-Hospital Education; Understands Diagnosis    (03/08/2017

   15:43:Marii Lenz RN)

Barriers to Learning:  None    (03/08/2017 15:43:Marii Lenz RN)

Learning Readiness:  Motivated    (03/08/2017 15:43:Marii Lenz RN)

Learns Best By:  1 to 1 Instruction    (03/08/2017 15:43:Marii Lenz RN)

Learning Needs:  Labor and Delivery Process; Pain Management; Symptoms

   to Report; Treatment Plan; Medication; Diagnosis; Nutrition;

   Equipment; Infant Care; Community Resources    (03/08/2017

   15:43:Marii Lenz RN)

   

=================================================================

DOMESTIC VIOLANCE SCREENING

=================================================================

   

Dom Viol Threatened/Hurt:  No    (03/08/2017 15:43:Marii Lenz RN)

Hx of Abuse/Neglect past 2yrs:  No    (03/08/2017 15:43:Marii Lenz RN)

Feel Unsafe Going Home:  No    (03/08/2017 15:43:Marii Lenz RN)

Addt'l Observ Indicating Abuse:  No    (03/08/2017 15:43:Marii Lenz RN)

Reason Unable to Complete Screen:  N/A, Screen Completed    (03/08/2017

   15:43:Marii Lenz RN)

Considered Personal Harm/Suicide:  No    (03/08/2017 15:43:Marii Lenz RN)

   

=================================================================

NUTRITIONAL/FUNCTIONAL SCREENING

=================================================================

   

Problem with Appetite >5 Days:  No    (03/08/2017 15:43:Marii Lenz RN)

Chew/Swallow Difficulties:  No    (03/08/2017 15:43:Marii Lenz RN)

Inappropriate Wt Gain/Loss:  No    (03/08/2017 15:43:Marii Lenz RN)

Presence Skin Breakdown/Ulcer:  No    (03/08/2017 15:43:Marii Lenz RN)

Special Diet:  No    (03/08/2017 15:43:Marii Lenz RN)

Pt Requests Dietician Visit:  No    (03/08/2017 15:43:Marii Lenz RN)

Hx of Any of the Following?:  N/A    (03/08/2017 15:43:Marii Lenz RN)

New Diagnosis of:  N/A    (03/08/2017 15:43:Marii Lenz RN)

Requires Assist w/Ambulation:  No    (03/08/2017 15:43:Marii Lenz RN)

Uses Assist Device to Ambulate:  No    (03/08/2017 15:43:Marii Lenz RN)

Pt Requires Help w/ADL's:  No    (03/08/2017 15:43:Marii Lenz RN) Ambulatory Visit  Name: Sharon Schoenberger      : 1955      MRN: [id number]  Encounter Provider: Jazmín Todd DO  Encounter Date: 2024   Encounter department: [facility]    Assessment & Plan  Medicare annual wellness visit, subsequent         Type 2 diabetes mellitus with morbid obesity (HCC)  Ideally controlled, cpm  Lab Results   Component Value Date    HGBA1C 5.2 2024       Orders:    POCT hemoglobin A1c    Encounter for diabetic foot exam (HCC)         Longstanding persistent atrial fibrillation (HCC)  Refuses anticoagulation and further testing       Refusal of anticoagulant medication by patient         Essential hypertension  Controlled, cpm       Encounter for immunization    Orders:    influenza vaccine, high-dose, PF 0.5 mL (Fluzone High Dose)    Urge urinary incontinence           Depression Screening and Follow-up Plan: Patient was screened for depression during today's encounter. They screened negative with a PHQ-2 score of 0.    Urinary Incontinence Plan of Care: counseling topics discussed: practice Kegel (pelvic floor strengthening) exercises and use restroom every 2 hours.     Preventive health issues were discussed with patient, and age appropriate screening tests were ordered as noted in patient's After Visit Summary. Personalized health advice and appropriate referrals for health education or preventive services given if needed, as noted in patient's After Visit Summary.  Chief Complaint   Patient presents with    Physical Exam    Follow-up       History of Present Illness     Medicare AWV + problem-focused f/u visit  No interval acute problems or illnesses       Patient Care Team:  Jazmín Todd DO as PCP - General  DO Chiquita Miranda CRNP as Nurse Practitioner (Nephrology)  Catherine Wooten DO (Nephrology)    Review of Systems   Constitutional: Negative.    Respiratory: Negative.     Cardiovascular: Negative.     Gastrointestinal: Negative.    Neurological: Negative.    Hematological: Negative.      Medical History Reviewed by provider this encounter:  Tobacco  Allergies  Meds  Problems  Med Hx  Surg Hx  Fam Hx       Annual Wellness Visit Questionnaire   [facility] is here for her Subsequent Wellness visit. Last Medicare Wellness visit information reviewed, patient interviewed and updates made to the record today.      Health Risk Assessment:   Patient rates overall health as good. Patient feels that their physical health rating is same. Patient is satisfied with their life. Eyesight was rated as same. Hearing was rated as same. Patient feels that their emotional and mental health rating is same. Patients states they are never, rarely angry. Patient states they are sometimes unusually tired/fatigued. Pain experienced in the last 7 days has been none. Patient states that she has experienced no weight loss or gain in last 6 months.     Depression Screening:   PHQ-2 Score: 0      Fall Risk Screening:   In the past year, patient has experienced: no history of falling in past year      Urinary Incontinence Screening:   Patient has leaked urine accidently in the last six months.     Home Safety:  Patient does not have trouble with stairs inside or outside of their home. Patient has working smoke alarms and has working carbon monoxide detector. Home safety hazards include: none.     Nutrition:   Current diet is Regular.     Medications:   Patient is currently taking over-the-counter supplements. OTC medications include: see medication list. Patient is able to manage medications.     Activities of Daily Living (ADLs)/Instrumental Activities of Daily Living (IADLs):   Walk and transfer into and out of bed and chair?: Yes  Dress and groom yourself?: Yes    Bathe or shower yourself?: Yes    Feed yourself? Yes  Do your laundry/housekeeping?: Yes  Manage your money, pay your bills and track your expenses?: Yes  Make your own meals?:  Yes    Do your own shopping?: Yes    Previous Hospitalizations:   Any hospitalizations or ED visits within the last 12 months?: No      Advance Care Planning:     Advanced directive: No    ACP document given: Yes      Cognitive Screening:   Provider or family/friend/caregiver concerned regarding cognition?: No    PREVENTIVE SCREENINGS      Cardiovascular Screening:    General: Screening Current      Diabetes Screening:     General: Screening Not Indicated and History Diabetes      Colorectal Cancer Screening:     General: Screening Current      Breast Cancer Screening:     General: Screening Current      Cervical Cancer Screening:    General: Screening Not Indicated      Osteoporosis Screening:    General: Screening Current      Abdominal Aortic Aneurysm (AAA) Screening:        General: Screening Not Indicated      Lung Cancer Screening:     General: Screening Not Indicated      Hepatitis C Screening:    General: Screening Current    Screening, Brief Intervention, and Referral to Treatment (SBIRT)    Screening  Typical number of drinks in a day: 0  Typical number of drinks in a week: 0  Interpretation: Low risk drinking behavior.    Single Item Drug Screening:  How often have you used an illegal drug (including marijuana) or a prescription medication for non-medical reasons in the past year? never    Single Item Drug Screen Score: 0  Interpretation: Negative screen for possible drug use disorder    Social Determinants of Health     Financial Resource Strain: Medium Risk (11/1/2023)    Overall Financial Resource Strain (CARDIA)     Difficulty of Paying Living Expenses: Somewhat hard   Food Insecurity: No Food Insecurity (11/4/2024)    Hunger Vital Sign     Worried About Running Out of Food in the Last Year: Never true     Ran Out of Food in the Last Year: Never true   Transportation Needs: No Transportation Needs (11/4/2024)    PRAPARE - Transportation     Lack of Transportation (Medical): No     Lack of  "Transportation (Non-Medical): No   Housing Stability: Low Risk  (11/4/2024)    Housing Stability Vital Sign     Unable to Pay for Housing in the Last Year: No     Number of Times Moved in the Last Year: 0     Homeless in the Last Year: No   Utilities: Not At Risk (11/4/2024)    [facility] Utilities     Threatened with loss of utilities: No     No results found.    Objective     /86 (BP Location: Left arm, Patient Position: Sitting, Cuff Size: Large)   Pulse 92   Temp 98.4 °F (36.9 °C) (Tympanic)   Resp 18   Ht 5' 8\" (1.727 m)   Wt 135 kg (298 lb)   SpO2 99%   BMI 45.31 kg/m²     Physical Exam  Vitals and nursing note reviewed.   Constitutional:       General: She is not in acute distress.     Appearance: She is well-groomed. She is morbidly obese. She is not ill-appearing, toxic-appearing or diaphoretic.   HENT:      Head: Normocephalic and atraumatic.      Right Ear: Tympanic membrane, ear canal and external ear normal.      Left Ear: Tympanic membrane, ear canal and external ear normal.      Nose: Nose normal.      Mouth/Throat:      Lips: Pink.      Mouth: Mucous membranes are moist.      Pharynx: Oropharynx is clear. Uvula midline.   Eyes:      General: Lids are normal.      Conjunctiva/sclera: Conjunctivae normal.      Pupils: Pupils are equal, round, and reactive to light.   Neck:      Thyroid: No thyroid mass, thyromegaly or thyroid tenderness.      Vascular: No JVD.      Trachea: Trachea and phonation normal.   Cardiovascular:      Rate and Rhythm: Normal rate. Rhythm irregularly irregular.      Pulses: Normal pulses.      Heart sounds: S1 normal and S2 normal.      No friction rub. No gallop.   Pulmonary:      Effort: Pulmonary effort is normal.      Breath sounds: Normal breath sounds.   Abdominal:      General: Bowel sounds are normal.      Palpations: Abdomen is soft. There is no hepatomegaly, splenomegaly or mass.      Tenderness: There is no abdominal tenderness.   Musculoskeletal:      Cervical " back: Neck supple.      Right lower leg: No edema.      Left lower leg: No edema.   Lymphadenopathy:      Cervical: No cervical adenopathy.   Skin:     General: Skin is warm and dry.      Capillary Refill: Capillary refill takes less than 2 seconds.      Coloration: Skin is not pale.   Neurological:      General: No focal deficit present.      Mental Status: She is alert and oriented to person, place, and time.      Gait: Gait normal.   Psychiatric:         Mood and Affect: Mood normal.         Behavior: Behavior normal. Behavior is cooperative.         Cognition and Memory: Cognition normal.

## 2024-11-04 NOTE — ASSESSMENT & PLAN NOTE
Ideally controlled, cpm  Lab Results   Component Value Date    HGBA1C 5.2 04/30/2024       Orders:    POCT hemoglobin A1c

## 2024-11-05 ENCOUNTER — TELEPHONE (OUTPATIENT)
Age: 69
End: 2024-11-05

## 2024-11-05 LAB — SL AMB POCT HEMOGLOBIN AIC: 5.5 (ref ?–6.5)

## 2024-11-05 NOTE — TELEPHONE ENCOUNTER
I called and spoke with [facility] regarding completing urine and blood studies prior to upcoming appt on 11/11/24.

## 2024-11-08 LAB
25(OH)D3+25(OH)D2 SERPL-MCNC: 35 NG/ML (ref 30–100)
ALBUMIN SERPL-MCNC: 4 G/DL (ref 3.5–5.7)
ALBUMIN/CREAT UR: 96.6
ALP SERPL-CCNC: 101 U/L (ref 35–120)
ALT SERPL-CCNC: 16 U/L
ANION GAP SERPL CALCULATED.3IONS-SCNC: 9 MMOL/L (ref 3–11)
AST SERPL-CCNC: 7 U/L
BASOPHILS # BLD AUTO: 0 THOU/CMM (ref 0–0.1)
BASOPHILS NFR BLD AUTO: 0 %
BILIRUB SERPL-MCNC: 0.5 MG/DL (ref 0.2–1)
BUN SERPL-MCNC: 24 MG/DL (ref 7–25)
CALCIUM SERPL-MCNC: 9 MG/DL (ref 8.5–10.5)
CHLORIDE SERPL-SCNC: 105 MMOL/L (ref 100–109)
CO2 SERPL-SCNC: 28 MMOL/L (ref 21–31)
CREAT SERPL-MCNC: 1.36 MG/DL (ref 0.4–1.1)
CREAT UR-MCNC: 49.7 MG/DL (ref 50–200)
CYTOLOGY CMNT CVX/VAG CYTO-IMP: ABNORMAL
DIFFERENTIAL METHOD BLD: ABNORMAL
EOSINOPHIL # BLD AUTO: 0.2 THOU/CMM (ref 0–0.5)
EOSINOPHIL NFR BLD AUTO: 2 %
ERYTHROCYTE [DISTWIDTH] IN BLOOD BY AUTOMATED COUNT: 16.2 % (ref 12–16)
GFR/BSA.PRED SERPLBLD CYS-BASED-ARV: 42 ML/MIN/{1.73_M2}
GLUCOSE SERPL-MCNC: 93 MG/DL (ref 65–99)
HCT VFR BLD AUTO: 38.9 % (ref 35–43)
HGB BLD-MCNC: 13.2 G/DL (ref 11.5–14.5)
LYMPHOCYTES # BLD AUTO: 1.7 THOU/CMM (ref 1–3)
LYMPHOCYTES NFR BLD AUTO: 27 %
MAGNESIUM SERPL-MCNC: 2 MG/DL (ref 1.4–2.2)
MCH RBC QN AUTO: 30.2 PG (ref 26–34)
MCHC RBC AUTO-ENTMCNC: 33.9 G/DL (ref 32–37)
MCV RBC AUTO: 89 FL (ref 80–100)
MICROALBUMIN UR-MCNC: 4.8 MG/DL
MONOCYTES # BLD AUTO: 0.6 THOU/CMM (ref 0.3–1)
MONOCYTES NFR BLD AUTO: 10 %
NEUTROPHILS # BLD AUTO: 3.8 THOU/CMM (ref 1.8–7.8)
NEUTROPHILS NFR BLD AUTO: 61 %
PHOSPHATE SERPL-MCNC: 3.4 MG/DL (ref 2.3–4.6)
PLATELET # BLD AUTO: 204 THOU/CMM (ref 140–350)
PMV BLD REES-ECKER: 9.2 FL (ref 7.5–11.3)
POTASSIUM SERPL-SCNC: 4.4 MMOL/L (ref 3.5–5.2)
PROT SERPL-MCNC: 6.8 G/DL (ref 6.3–8.3)
RBC # BLD AUTO: 4.37 MILL/CMM (ref 3.7–4.7)
SODIUM SERPL-SCNC: 142 MMOL/L (ref 135–145)
URATE SERPL-MCNC: 6 MG/DL (ref 2.4–6)
WBC # BLD AUTO: 6.3 THOU/CMM (ref 4–10)

## 2024-11-09 LAB
BACTERIA URNS QL MICRO: ABNORMAL
GLUCOSE UR QL STRIP: ABNORMAL MG/DL
HGB UR QL STRIP: NEGATIVE MG/DL
KETONES UR QL STRIP: NEGATIVE MG/DL
LEUKOCYTE ESTERASE UR QL STRIP: ABNORMAL /UL
NITRITE UR QL STRIP: NEGATIVE
PH UR: 5 [PH] (ref 4.5–8)
PROT 24H UR-MRATE: NEGATIVE MG/DL
PTH-INTACT SERPL-MCNC: 179.7 PG/ML (ref 12–88)
RBC #/AREA URNS HPF: ABNORMAL /HPF (ref 0–2)
SL AMB POCT URINE COMMENT: ABNORMAL
SP GR UR: 1.01 (ref 1–1.03)
SQUAMOUS #/AREA URNS HPF: >10 /LPF (ref 0–5)
TRANS CELLS #/AREA URNS HPF: ABNORMAL /LPF (ref 0–1)
WBC #/AREA URNS HPF: ABNORMAL /HPF (ref 0–5)

## 2024-11-11 ENCOUNTER — OFFICE VISIT (OUTPATIENT)
Age: 69
End: 2024-11-11
Payer: COMMERCIAL

## 2024-11-11 VITALS
OXYGEN SATURATION: 99 % | SYSTOLIC BLOOD PRESSURE: 140 MMHG | DIASTOLIC BLOOD PRESSURE: 82 MMHG | TEMPERATURE: 98.5 F | HEIGHT: 69 IN | BODY MASS INDEX: 43.4 KG/M2 | WEIGHT: 293 LBS | HEART RATE: 79 BPM

## 2024-11-11 DIAGNOSIS — N18.30 STAGE 3 CHRONIC KIDNEY DISEASE, UNSPECIFIED WHETHER STAGE 3A OR 3B CKD (HCC): Primary | ICD-10-CM

## 2024-11-11 DIAGNOSIS — I10 ESSENTIAL HYPERTENSION: ICD-10-CM

## 2024-11-11 DIAGNOSIS — E66.01 MORBID OBESITY (HCC): ICD-10-CM

## 2024-11-11 DIAGNOSIS — E11.9 DIET-CONTROLLED TYPE 2 DIABETES MELLITUS (HCC): ICD-10-CM

## 2024-11-11 DIAGNOSIS — N25.81 SECONDARY HYPERPARATHYROIDISM OF RENAL ORIGIN (HCC): ICD-10-CM

## 2024-11-11 DIAGNOSIS — R60.0 BILATERAL LOWER EXTREMITY EDEMA: ICD-10-CM

## 2024-11-11 PROCEDURE — 99214 OFFICE O/P EST MOD 30 MIN: CPT | Performed by: INTERNAL MEDICINE

## 2024-11-11 NOTE — PATIENT INSTRUCTIONS
We discussed that your parathyroid level is high and your would benefit from activated vitamin D that is available by a prescription. The vitamin D you would buy online or from a grocery store is inactivated.   You have elected to monitor your PTH level prior to next visit prior to making changes.   You have stable stage 3 kidney function at 42%.   Your are on kidney protection medications --Jardiance and Lisinopril.   You have a repeat kidney ultrasound ordered, you are planning on in December.

## 2024-11-11 NOTE — PROGRESS NOTES
1. Stage 3 chronic kidney disease, unspecified whether stage 3a or 3b CKD (Formerly [facility])  -     Urinalysis with microscopic; Future; Expected date: 05/06/2025  -     PTH, intact; Future; Expected date: 05/06/2025  -     Albumin / creatinine urine ratio; Future; Expected date: 05/06/2025  -     Comprehensive metabolic panel; Future; Expected date: 05/06/2025  -     CBC and Platelet; Future; Expected date: 05/06/2025  2. Secondary hyperparathyroidism of renal origin (Formerly [facility])  -     Urinalysis with microscopic; Future; Expected date: 05/06/2025  -     PTH, intact; Future; Expected date: 05/06/2025  -     Albumin / creatinine urine ratio; Future; Expected date: 05/06/2025  -     Comprehensive metabolic panel; Future; Expected date: 05/06/2025  -     CBC and Platelet; Future; Expected date: 05/06/2025  3. Diet-controlled type 2 diabetes mellitus (Formerly [facility])  4. Essential hypertension  5. Bilateral lower extremity edema  6. Morbid obesity (Formerly [facility])           CKD 3  Baseline 1.3-1.4 mg/dL. 11/8/24 Cr 1.36 with eGFR 42 ml/min. Metabolic and volume status stable.  Etiology 2/2 presumed HTN,DKD, obesity.  UA few bacteria but patient asymptomatic.  Normotensive. BP goal <130/80.  No evidence of anemia.    Continue Jardiance and Lisinopril for CKD progression.  Discussed addition of calctriol but patient declines.  Fu in 6 months with labs prior.  Fu in [facility] in December.    2. Secondary HPT  Follow PTH,Ca trends. .7.  25 vitamin D 35, sufficient.     Discussed addition of calcitriol, patient declined addition of therapy at this time.    3. Diet controlled type 2 DM  Continue Jardiance 10mg/day.   Consider GLP1 for weight management, but would defer to primary.  Trend albuminuria q 6 months.  A1C 5.5 11/5/24.    4. Essential HTN  Bp reasonably well controlled at present, reports home /70.Amlodipine 5mg/day, metoprolol tartate 50mg BID and lisinopril 10mg/day, torsemide 10mg as needed.   No changes indicated at present.    5.  "Bilateral lower extremity edema  Patient reports stable. Continue torsemide q 24-48 hr. Maintain 2 gram Na diet.    6. Morbid obesity   Consider GLP1, would defer to primary team.  Continue Jardiance 10mg QOD (taking every other day due to cost ).    Health maintenance   Continue age appropriate vaccine per primary team, recently received flu vaccine.    The benefits, risks and alternatives to the treatment plan were discussed at this visit. Patient was advised of common adverse effects of any medical therapies prescribed. All questions were answered and discussed with the patient and any accompanying family members or caretakers.      Subjective:      Patient ID: Sharon Schoenberger is a 69 y.o. female presents for follow up in the [place] office for CKD management.Patient last seen 7/23/24.   Patient has hx CKD3b, HTN, obesity, Type 2 DM, proteinuria, obesity, afib.   HPI  In the interim since last visit, denies any new medical conditions or surgeries.    /70. Previously on HCTZ but DC. Currently on Amlodipine 5mg/day, metoprolol tartate 50mg BID and lisinopril 10mg/day, torsemide 10mg as needed.    Takes torsemide every every day or every 2 days and leg swelling is stable.   If changes position too quickly may get dizzy. She gets fatigued faster than she used to.   Received flu vaccine.       The following portions of the patient's history were reviewed and updated as appropriate: allergies, current medications, past family history, past medical history, past social history, past surgical history, and problem list.    Review of Systems   Respiratory: Negative.     Cardiovascular:  Positive for leg swelling.   Gastrointestinal: Negative.    Genitourinary: Negative.    Neurological: Negative.    All other systems reviewed and are negative.        Objective:      /82 (BP Location: Left arm, Patient Position: Sitting, Cuff Size: Large)   Pulse 79   Temp 98.5 °F (36.9 °C) (Tympanic)   Ht 5' 9\" (1.753 " m)   Wt 134 kg (296 lb 6.4 oz)   SpO2 99%   BMI 43.77 kg/m²          Physical Exam  Vitals reviewed.   Constitutional:       General: She is not in acute distress.     Appearance: She is not ill-appearing.   HENT:      Head: Normocephalic and atraumatic.      Nose: Nose normal.      Mouth/Throat:      Mouth: Mucous membranes are moist.   Cardiovascular:      Rate and Rhythm: Normal rate and regular rhythm.      Heart sounds:      No friction rub.   Pulmonary:      Breath sounds: No wheezing or rales.   Abdominal:      Tenderness: There is no abdominal tenderness. There is no guarding.   Musculoskeletal:      Right lower leg: Edema present.      Left lower leg: Edema present.   Skin:     Coloration: Skin is not jaundiced.      Findings: No bruising or rash.   Neurological:      General: No focal deficit present.      Mental Status: She is alert and oriented to person, place, and time. Mental status is at baseline.      Cranial Nerves: No cranial nerve deficit.   Psychiatric:         Mood and Affect: Mood normal.         Behavior: Behavior normal.             Lab Results   Component Value Date     04/14/2018    SODIUM 142 11/08/2024    K 4.4 11/08/2024     11/08/2024    CO2 28 11/08/2024    ANIONGAP 13.4 04/14/2018    AGAP 9 11/08/2024    BUN 24 11/08/2024    CREATININE 1.36 (H) 11/08/2024    GLUC 93 11/08/2024    GLUF 102 (H) 12/08/2023    CALCIUM 9.0 11/08/2024    AST 7 11/08/2024    ALT 16 11/08/2024    ALKPHOS 101 11/08/2024    PROT 7.2 04/14/2018    TP 6.8 11/08/2024    BILITOT 0.4 04/14/2018    TBILI 0.5 11/08/2024    EGFR 42 (L) 11/08/2024      Lab Results   Component Value Date    CREATININE 1.36 (H) 11/08/2024    CREATININE 1.35 (H) 07/22/2024    CREATININE 1.41 (H) 04/26/2024    CREATININE 1.72 (H) 03/16/2024    CREATININE 1.56 (H) 01/05/2024    CREATININE 1.31 (H) 12/08/2023    CREATININE 1.18 (H) 02/01/2023    CREATININE 1.25 (H) 12/01/2022    CREATININE 1.39 (H) 05/25/2022    CREATININE  "1.32 (H) 12/22/2021    CREATININE 1.33 (H) 09/14/2021    CREATININE 1.34 (H) 10/31/2020    CREATININE 1.20 10/13/2018    CREATININE 1.16 04/14/2018    CREATININE 1.09 11/11/2017      Lab Results   Component Value Date    COLORU Colorless 12/08/2023    CLARITYU Clear 12/08/2023    SPECGRAV 1.007 11/08/2024    PHUR 6.0 12/08/2023    LEUKOCYTESUR  (A) 11/08/2024    NITRITE Negative 11/08/2024    PROTEIN UA Negative 11/08/2024    GLUCOSEU >wo=441 (A) 11/08/2024    KETONESU Negative 11/08/2024    UROBILINOGEN <2.0 12/08/2023    BILIRUBINUR Negative 12/08/2023    BLOODU Negative 11/08/2024    RBCUA 0-2 11/08/2024    WBCUA 11-20 11/08/2024    EPIS Occasional 12/08/2023    BACTERIA Few (A) 11/08/2024      No results found for: \"LABPROT\"  No results found for: \"MICROALBUR\", \"INPF44YOZ\"  Lab Results   Component Value Date    WBC 6.3 11/08/2024    HGB 13.2 11/08/2024    HCT 38.9 11/08/2024    MCV 89 11/08/2024     11/08/2024      Lab Results   Component Value Date    HGB 13.2 11/08/2024    HGB 11.8 09/14/2021    HGB 12.6 10/13/2018    HGB 12.9 11/11/2017    HGB 12.3 04/19/2017      No results found for: \"IRON\", \"TIBC\", \"FERRITIN\"   Lab Results   Component Value Date    PHOSPHORUS 3.4 11/08/2024      Lab Results   Component Value Date    CHOLESTEROL 161 10/13/2018    HDL 51 10/13/2018    LDLCALC 92 10/13/2018    TRIG 88 10/13/2018      Lab Results   Component Value Date    URICACID 6.0 11/08/2024      Lab Results   Component Value Date    HGBA1C 5.5 11/05/2024      No results found for: \"TSHANTIBODY\", \"V7QCGNE\", \"FREET4\"   No results found for: \"KOFFI\", \"DSDNAAB\", \"RFIGM\"   Lab Results   Component Value Date    PROT 7.2 04/14/2018        Portions of the record may have been created with voice recognition software. Occasional wrong word or \"sound a like\" substitutions may have occurred due to the inherent limitations of voice recognition software. Read the chart carefully and recognize, using context, where " substitutions have occurred. If you have any questions, please contact the dictating provider.

## 2024-12-02 ENCOUNTER — TELEPHONE (OUTPATIENT)
Age: 69
End: 2024-12-02

## 2024-12-02 ENCOUNTER — HOSPITAL ENCOUNTER (OUTPATIENT)
Dept: ULTRASOUND IMAGING | Facility: HOSPITAL | Age: 69
Discharge: HOME | End: 2024-12-02
Payer: COMMERCIAL

## 2024-12-02 DIAGNOSIS — N28.1 CYST OF KIDNEY, ACQUIRED: ICD-10-CM

## 2024-12-02 PROCEDURE — 76775 US EXAM ABDO BACK WALL LIM: CPT

## 2024-12-02 NOTE — TELEPHONE ENCOUNTER
Patient called in asking if there were any eating restrictions on her upcoming ultrasound.     I contacted central scheduling who informed me that there are no eating restrictions. She can take her medications as normal and she just needs to drink a lot of water the morning of so that she has a full bladder.    Informed patient and she verbalized her understanding.

## 2024-12-05 ENCOUNTER — RESULTS FOLLOW-UP (OUTPATIENT)
Dept: NEPHROLOGY | Facility: CLINIC | Age: 69
End: 2024-12-05

## 2024-12-05 NOTE — TELEPHONE ENCOUNTER
I called and spoke with Peace regarding the following:      ----- Message from Jean-Paul Huntley DO sent at 12/5/2024  1:03 PM EST -----  Kidney ultrasound looks okay, left kidney cyst of 8.1 cm appears to be benign, no further imaging indicated      She is aware of US results. She had no questions or concerns.

## 2024-12-13 DIAGNOSIS — I10 ESSENTIAL HYPERTENSION: ICD-10-CM

## 2024-12-13 DIAGNOSIS — E11.69 TYPE 2 DIABETES MELLITUS WITH MORBID OBESITY (HCC): ICD-10-CM

## 2024-12-13 DIAGNOSIS — N18.30 CONTROLLED TYPE 2 DIABETES MELLITUS WITH STAGE 3 CHRONIC KIDNEY DISEASE, WITHOUT LONG-TERM CURRENT USE OF INSULIN (HCC): ICD-10-CM

## 2024-12-13 DIAGNOSIS — R80.1 PERSISTENT PROTEINURIA: ICD-10-CM

## 2024-12-13 DIAGNOSIS — E66.01 TYPE 2 DIABETES MELLITUS WITH MORBID OBESITY (HCC): ICD-10-CM

## 2024-12-13 DIAGNOSIS — E11.22 CONTROLLED TYPE 2 DIABETES MELLITUS WITH STAGE 3 CHRONIC KIDNEY DISEASE, WITHOUT LONG-TERM CURRENT USE OF INSULIN (HCC): ICD-10-CM

## 2024-12-13 RX ORDER — EMPAGLIFLOZIN 10 MG/1
10 TABLET, FILM COATED ORAL EVERY MORNING
Qty: 30 TABLET | Refills: 5 | Status: SHIPPED | OUTPATIENT
Start: 2024-12-13

## 2024-12-13 RX ORDER — METOPROLOL TARTRATE 50 MG
50 TABLET ORAL 2 TIMES DAILY
Qty: 180 TABLET | Refills: 1 | Status: SHIPPED | OUTPATIENT
Start: 2024-12-13

## 2024-12-17 RX ORDER — ATORVASTATIN CALCIUM 10 MG/1
10 TABLET, FILM COATED ORAL DAILY
Qty: 30 TABLET | Refills: 0 | Status: SHIPPED | OUTPATIENT
Start: 2024-12-17

## 2024-12-17 NOTE — TELEPHONE ENCOUNTER
Courtesy refill   Please remind pt there are active lab orders that must be done before any further refills of the lipitor

## 2024-12-17 NOTE — TELEPHONE ENCOUNTER
Patient called requesting refill for atorvastatin (LIPITOR) 10 mg tablet . Patient made aware medication was refilled on 12/17/2024 for 30 tablets with 0 refills to [facility]. Patient instructed to contact the pharmacy to obtain refills of medication. Patient verbalized understanding.

## 2025-01-09 DIAGNOSIS — E66.01 TYPE 2 DIABETES MELLITUS WITH MORBID OBESITY (HCC): ICD-10-CM

## 2025-01-09 DIAGNOSIS — E11.69 TYPE 2 DIABETES MELLITUS WITH MORBID OBESITY (HCC): ICD-10-CM

## 2025-01-13 RX ORDER — ATORVASTATIN CALCIUM 10 MG/1
10 TABLET, FILM COATED ORAL DAILY
Qty: 30 TABLET | Refills: 0 | Status: SHIPPED | OUTPATIENT
Start: 2025-01-13

## 2025-01-13 NOTE — TELEPHONE ENCOUNTER
One more 30-day courtesy refill being given  Order for fasting lipids still active from 04/30/24 - please remind pt that this must be done ASAP

## 2025-01-30 ENCOUNTER — VBI (OUTPATIENT)
Dept: ADMINISTRATIVE | Facility: OTHER | Age: 70
End: 2025-01-30

## 2025-01-30 NOTE — TELEPHONE ENCOUNTER
01/30/25 2:44 PM     Chart reviewed for Blood Pressure was/were not submitted to the patient's insurance.     Yasmeen Daniels MA   [facility]

## 2025-02-05 DIAGNOSIS — R60.0 BILATERAL LOWER EXTREMITY EDEMA: ICD-10-CM

## 2025-02-06 RX ORDER — TORSEMIDE 10 MG/1
10 TABLET ORAL AS NEEDED
Qty: 20 TABLET | Refills: 3 | Status: SHIPPED | OUTPATIENT
Start: 2025-02-06

## 2025-02-08 DIAGNOSIS — E11.69 TYPE 2 DIABETES MELLITUS WITH MORBID OBESITY (HCC): ICD-10-CM

## 2025-02-08 DIAGNOSIS — E66.01 TYPE 2 DIABETES MELLITUS WITH MORBID OBESITY (HCC): ICD-10-CM

## 2025-02-11 ENCOUNTER — TELEPHONE (OUTPATIENT)
Age: 70
End: 2025-02-11

## 2025-02-11 RX ORDER — ATORVASTATIN CALCIUM 10 MG/1
10 TABLET, FILM COATED ORAL DAILY
Qty: 90 TABLET | Refills: 1 | OUTPATIENT
Start: 2025-02-11

## 2025-02-11 NOTE — TELEPHONE ENCOUNTER
Pt called, she has an order dated 4/30/2024 to have ECG 12 lead     Does PCP still want her to have this done?  If yes, how soon should she have done?  Please advise.

## 2025-02-11 NOTE — TELEPHONE ENCOUNTER
Attempted to call pt and it states pt was busy on another line. When pt returns call, please advise that, per Dr. Todd, she can have the EKG completed now.

## 2025-02-11 NOTE — TELEPHONE ENCOUNTER
Called and left VM for pt. When pt returns call, please advise that we are unable to refill her  ATORVASTATIN until her fasting lipid panel is obtained.   
Changes Requested     Name from pharmacy: ATORVASTATIN 10 MG TABLET         Will file in chart as: atorvastatin (LIPITOR) 10 mg tablet    Sig: TAKE 1 TABLET BY MOUTH EVERY DAY    Disp: 90 tablet    Refills: 1    Start: 2/8/2025    Class: Normal    Non-formulary For: Type 2 diabetes mellitus with morbid obesity (HCC)    Last ordered: 4 weeks ago (1/13/2025) by Jazmín Todd DO    Last refill: 1/13/2025    Rx #: [id number]    Pharmacy comment: REQUEST FOR 90 DAYS PRESCRIPTION. DX Code Needed.    Cardiovascular:  Antilipid - Statins [id number]/08/2025 12:31 PM   Protocol Details Total Cholesterol within 360 days    LDL within 360 days    HDL within 360 days    Triglycerides within 360 days    Valid encounter within last 12 months      This request has changes from the previous prescription.   To be filled at: [facility]/pharmacy #[id number] - [place] - [id number]        
Final courtesy refill was given last month, and pt has still not obtained fasting lipid panel      Me   1/13/25  2:24 PM  Note      One more 30-day courtesy refill being given  Order for fasting lipids still active from 04/30/24 - please remind pt that this must be done ASAP      1/13/25  2:24 PM  You routed this conversation to [facility]  [facility]  ZAIN Amos    1/13/25  2:30 PM  Note      Called and left vm for pt.        ZAIN Amos    1/13/25  2:31 PM  Note      Spoke to pt. Pt understood.          
Pt returned call.  Relayed provider's message:    we are unable to refill her  ATORVASTATIN until her fasting lipid panel is obtained.     Pt has script for lipid panel dated 4/30. She will have lab work done either today or by the end of the week (weather permitting)    
10
0

## 2025-02-12 DIAGNOSIS — E66.01 TYPE 2 DIABETES MELLITUS WITH MORBID OBESITY (HCC): ICD-10-CM

## 2025-02-12 DIAGNOSIS — E11.69 TYPE 2 DIABETES MELLITUS WITH MORBID OBESITY (HCC): ICD-10-CM

## 2025-02-12 LAB
CHOLEST SERPL-MCNC: 126 MG/DL
CHOLEST/HDLC SERPL: 2.3 {RATIO}
HDLC SERPL-MCNC: 54 MG/DL (ref 23–92)
LDLC SERPL CALC-MCNC: 57 MG/DL
NONHDLC SERPL-MCNC: 72 MG/DL
TRIGL SERPL-MCNC: 76 MG/DL

## 2025-02-12 RX ORDER — ATORVASTATIN CALCIUM 10 MG/1
10 TABLET, FILM COATED ORAL DAILY
Qty: 30 TABLET | Refills: 0 | OUTPATIENT
Start: 2025-02-12

## 2025-02-14 DIAGNOSIS — E11.69 TYPE 2 DIABETES MELLITUS WITH MORBID OBESITY (HCC): ICD-10-CM

## 2025-02-14 DIAGNOSIS — E66.01 TYPE 2 DIABETES MELLITUS WITH MORBID OBESITY (HCC): ICD-10-CM

## 2025-02-14 RX ORDER — ATORVASTATIN CALCIUM 10 MG/1
10 TABLET, FILM COATED ORAL DAILY
Qty: 90 TABLET | Refills: 1 | Status: SHIPPED | OUTPATIENT
Start: 2025-02-14

## 2025-02-14 NOTE — TELEPHONE ENCOUNTER
Pt had recent blood work done and wanted to make sure she is to take the same dose. I informed her that everything is in range meaning the medication is working exactly as it should but I will send a message to Dr. Todd to be certain.    Reason for call:   [x] Refill   [] Prior Auth  [] Other:     Office:   [x] PCP/Provider - [facility]   [] Specialty/Provider -     Medication:   ~ atorvastatin (LIPITOR) 10 mg tablet - TAKE 1 TABLET BY MOUTH EVERY DAY     Pharmacy:   [facility]/pharmacy #[id number] [place], [place] - [id number]     Does the patient have enough for 3 days?   [] Yes   [x] No - Send as HP to POD

## 2025-03-03 DIAGNOSIS — I10 ESSENTIAL HYPERTENSION: ICD-10-CM

## 2025-03-03 RX ORDER — AMLODIPINE BESYLATE 5 MG/1
5 TABLET ORAL DAILY
Qty: 90 TABLET | Refills: 1 | Status: SHIPPED | OUTPATIENT
Start: 2025-03-03

## 2025-03-03 NOTE — TELEPHONE ENCOUNTER
Reason for call:   [x] Refill   [] Prior Auth  [] Other:     Office: [facility]   [x] PCP/Provider - Jazmín Todd   [] Specialty/Provider -     Medication: amlodipine     Dose/Frequency: 5 mg/ daily     Quantity: 90 day supply     Pharmacy: [facility] in [place]     Does the patient have enough for 3 days?   [] Yes   [x] No - Send as HP to POD

## 2025-03-09 DIAGNOSIS — I10 ESSENTIAL HYPERTENSION: ICD-10-CM

## 2025-03-10 RX ORDER — LISINOPRIL 10 MG/1
10 TABLET ORAL DAILY
Qty: 90 TABLET | Refills: 1 | Status: SHIPPED | OUTPATIENT
Start: 2025-03-10

## 2025-03-14 ENCOUNTER — VBI (OUTPATIENT)
Dept: ADMINISTRATIVE | Facility: OTHER | Age: 70
End: 2025-03-14

## 2025-03-14 NOTE — TELEPHONE ENCOUNTER
03/14/25 11:56 AM     Chart reviewed for Blood Pressure was/were not submitted to the patient's insurance.     Jenise Aparicio MA   [facility]

## 2025-04-02 ENCOUNTER — TELEPHONE (OUTPATIENT)
Dept: FAMILY MEDICINE CLINIC | Facility: CLINIC | Age: 70
End: 2025-04-02

## 2025-04-02 NOTE — TELEPHONE ENCOUNTER
Patient called requesting refill for metoprolol tartrate (LOPRESSOR). Patient made aware medication was refilled on 12/13 for 180 tablets with 1 refill to [facility] in [place]. Patient instructed to contact the pharmacy to obtain refills of medication. Patient verbalized understanding.

## 2025-04-12 NOTE — PROGRESS NOTES
OP CM called to pt again and LM in regards to [facility] referral. Heterosexual

## 2025-05-12 ENCOUNTER — TELEPHONE (OUTPATIENT)
Age: 70
End: 2025-05-12

## 2025-05-12 NOTE — TELEPHONE ENCOUNTER
I called and spoke with Peace regarding completing blood/urine labs prior to upcoming appt on 05/19/2025.

## 2025-05-14 LAB
ALBUMIN SERPL-MCNC: 4.2 G/DL (ref 3.5–5.7)
ALBUMIN/CREAT UR: 46.5
ALP SERPL-CCNC: 86 U/L (ref 35–120)
ALT SERPL-CCNC: 14 U/L
ANION GAP SERPL CALCULATED.3IONS-SCNC: 8 MMOL/L (ref 3–11)
AST SERPL-CCNC: 8 U/L
BACTERIA URNS QL MICRO: ABNORMAL
BILIRUB SERPL-MCNC: 0.6 MG/DL (ref 0.2–1)
BUN SERPL-MCNC: 29 MG/DL (ref 7–25)
CALCIUM SERPL-MCNC: 9.2 MG/DL (ref 8.5–10.5)
CHLORIDE SERPL-SCNC: 103 MMOL/L (ref 100–109)
CO2 SERPL-SCNC: 28 MMOL/L (ref 21–31)
CREAT SERPL-MCNC: 1.45 MG/DL (ref 0.4–1.1)
CREAT UR-MCNC: 75.2 MG/DL (ref 50–200)
CYTOLOGY CMNT CVX/VAG CYTO-IMP: ABNORMAL
ERYTHROCYTE [DISTWIDTH] IN BLOOD BY AUTOMATED COUNT: 15.4 % (ref 12–16)
GFR/BSA.PRED SERPLBLD CYS-BASED-ARV: 39 ML/MIN/{1.73_M2}
GLUCOSE SERPL-MCNC: 88 MG/DL (ref 65–99)
GLUCOSE UR QL STRIP: 300 MG/DL
HCT VFR BLD AUTO: 38.4 % (ref 35–43)
HGB BLD-MCNC: 12.4 G/DL (ref 11.5–14.5)
HGB UR QL STRIP: 0.03 MG/DL
KETONES UR QL STRIP: NEGATIVE MG/DL
LEUKOCYTE ESTERASE UR QL STRIP: 500 /UL
MCH RBC QN AUTO: 28.7 PG (ref 26–34)
MCHC RBC AUTO-ENTMCNC: 32.3 G/DL (ref 32–37)
MCV RBC AUTO: 89 FL (ref 80–100)
MICROALBUMIN UR-MCNC: 3.5 MG/DL
MUCOUS THREADS URNS QL MICRO: ABNORMAL
NITRITE UR QL STRIP: NEGATIVE
PH UR: 5.5 [PH] (ref 4.5–8)
PLATELET # BLD AUTO: 216 THOU/CMM (ref 140–350)
PMV BLD REES-ECKER: 8.9 FL (ref 7.5–11.3)
POTASSIUM SERPL-SCNC: 4.6 MMOL/L (ref 3.5–5.2)
PROT 24H UR-MRATE: NEGATIVE MG/DL
PROT SERPL-MCNC: 7 G/DL (ref 6.3–8.3)
PTH-INTACT SERPL-MCNC: 100.7 PG/ML (ref 12–88)
RBC # BLD AUTO: 4.33 MILL/CMM (ref 3.7–4.7)
RBC #/AREA URNS HPF: 0 /HPF (ref 0–2)
SL AMB POCT URINE COMMENT: ABNORMAL
SODIUM SERPL-SCNC: 139 MMOL/L (ref 135–145)
SP GR UR: 1.01 (ref 1–1.03)
SQUAMOUS #/AREA URNS HPF: >10 /LPF (ref 0–5)
TRANS CELLS #/AREA URNS HPF: >10 /LPF (ref 0–1)
WBC # BLD AUTO: 6.7 THOU/CMM (ref 4–10)
WBC #/AREA URNS HPF: 21 /HPF (ref 0–5)

## 2025-05-15 LAB
LEFT EYE DIABETIC RETINOPATHY: NORMAL
RIGHT EYE DIABETIC RETINOPATHY: NORMAL

## 2025-05-19 ENCOUNTER — OFFICE VISIT (OUTPATIENT)
Age: 70
End: 2025-05-19
Payer: COMMERCIAL

## 2025-05-19 VITALS
HEART RATE: 88 BPM | OXYGEN SATURATION: 99 % | BODY MASS INDEX: 43.8 KG/M2 | DIASTOLIC BLOOD PRESSURE: 80 MMHG | SYSTOLIC BLOOD PRESSURE: 136 MMHG | TEMPERATURE: 97.6 F | WEIGHT: 293 LBS

## 2025-05-19 DIAGNOSIS — N25.81 SECONDARY HYPERPARATHYROIDISM OF RENAL ORIGIN (HCC): ICD-10-CM

## 2025-05-19 DIAGNOSIS — E11.22 CONTROLLED TYPE 2 DIABETES MELLITUS WITH STAGE 3 CHRONIC KIDNEY DISEASE, WITHOUT LONG-TERM CURRENT USE OF INSULIN (HCC): ICD-10-CM

## 2025-05-19 DIAGNOSIS — E55.9 VITAMIN D DEFICIENCY: ICD-10-CM

## 2025-05-19 DIAGNOSIS — N18.32 STAGE 3B CHRONIC KIDNEY DISEASE (HCC): ICD-10-CM

## 2025-05-19 DIAGNOSIS — N18.30 STAGE 3 CHRONIC KIDNEY DISEASE, UNSPECIFIED WHETHER STAGE 3A OR 3B CKD (HCC): Primary | ICD-10-CM

## 2025-05-19 DIAGNOSIS — E66.01 MORBID OBESITY (HCC): ICD-10-CM

## 2025-05-19 DIAGNOSIS — I10 ESSENTIAL HYPERTENSION: ICD-10-CM

## 2025-05-19 DIAGNOSIS — N18.30 CONTROLLED TYPE 2 DIABETES MELLITUS WITH STAGE 3 CHRONIC KIDNEY DISEASE, WITHOUT LONG-TERM CURRENT USE OF INSULIN (HCC): ICD-10-CM

## 2025-05-19 PROCEDURE — 99214 OFFICE O/P EST MOD 30 MIN: CPT | Performed by: NURSE PRACTITIONER

## 2025-05-19 NOTE — ASSESSMENT & PLAN NOTE
Blood pressure log below.  All at goal.  Some fatigue.  No objection to reducing amlodipine to 2.5 mg daily.  Continue lisinopril 10 mg daily, Lopressor 50 mg twice daily

## 2025-05-19 NOTE — PROGRESS NOTES
[facility]   Office Follow-Up  Sharon Schoenberger 69 y.o. female MRN: [id number]    Encounter: [id number]        Assessment & Plan    Sharon Schoenberger was seen in the [place] office today. All diagnoses and orders for visit:     Assessment & Plan  Stage 3b chronic kidney disease (HCC)  Lab Results   Component Value Date    EGFR 39 (L) 05/14/2025    EGFR 42 (L) 11/08/2024    EGFR 42 (L) 07/22/2024    CREATININE 1.45 (H) 05/14/2025    CREATININE 1.36 (H) 11/08/2024    CREATININE 1.35 (H) 07/22/2024   Baseline creatinine 1.3-1.4 mg/dL.  Etiology presumed hypertensive nephrosclerosis, diabetic nephropathy, obesity related hyperfiltration.  Continue Jardiance and lisinopril for CKD progression.  Repeat labs in 6 months  Secondary hyperparathyroidism of renal origin (Formerly [facility])  PTH downtrending.  Prefers to hold off on activated vitamin D agent.  Repeat PTH and vitamin D with next labs  Vitamin D deficiency  Continue vitamin D supplementation  Morbid obesity (Formerly [facility])  Consider GLP-1 RA-defer to primary.    Controlled type 2 diabetes mellitus with stage 3 chronic kidney disease, without long-term current use of insulin (Formerly [facility])    Lab Results   Component Value Date    HGBA1C 5.5 11/05/2024   A1c well-controlled.  Remains on Jardiance every other day due to cost.  Essential hypertension  Blood pressure log below.  All at goal.  Some fatigue.  No objection to reducing amlodipine to 2.5 mg daily.  Continue lisinopril 10 mg daily, Lopressor 50 mg twice daily        HPI: Sharon Schoenberger is a 69 y.o. female who is here for scheduled follow-up    Pertinent medical problems include: CKD 3B, hypertension, obesity, T2DM, A-fib    Home blood pressure log reviewed and as outlined below.  She can try cutting amlodipine in half.  Due for torsemide-will take later today for lower extremity edema.  Continues on Jardiance every other day due to cost.  Lab Slips given to be done prior to next  visit    120/69  115/70  124/69  120/67  126/70  133/71  108/67  120/61          ROS:   Review of Systems   Constitutional:  Positive for fatigue. Negative for chills and fever.   HENT:  Negative for ear pain and sore throat.    Eyes:  Negative for pain and visual disturbance.   Respiratory:  Negative for cough and shortness of breath.    Cardiovascular:  Positive for leg swelling. Negative for chest pain and palpitations.   Gastrointestinal:  Negative for abdominal pain and vomiting.   Genitourinary:  Negative for dysuria and hematuria.   Musculoskeletal:  Negative for arthralgias and back pain.   Skin:  Negative for color change and rash.   Neurological:  Negative for seizures and syncope.   All other systems reviewed and are negative.      Allergies: Seasonal ic  [cholestatin]    Medications: Current Medications[1]    Past Medical History:   Diagnosis Date    Elevated fasting glucose 8/8/2019    Hypokalemia     Last Assessed: 8/12/2016     Hyponatremia     Last Assessed: 8/12/2016     Irregular heartbeat     A Flutter 7/2015 while in hospital for pneumonia, resolved     Pneumonia     Vitamin D deficiency 3/15/2022     History reviewed. No pertinent surgical history.  Family History   Problem Relation Age of Onset    Colon cancer Mother     Tongue cancer Father     Hypertension Father     Other Sister         Epilepsy     Hypertension Sister     No Known Problems Sister     No Known Problems Maternal Grandmother     No Known Problems Maternal Grandfather     Breast cancer Paternal Grandmother 85    Colon cancer Brother     Lung cancer Brother         secondary     Hypertension Brother     Breast cancer Maternal Aunt     No Known Problems Maternal Aunt     No Known Problems Paternal Aunt     Cancer Neg Hx     BRCA2 Positive Neg Hx     BRCA2 Negative Neg Hx     BRCA1 Positive Neg Hx     BRCA1 Negative Neg Hx     Ovarian cancer Neg Hx     BRCA 1/2 Neg Hx     Endometrial cancer Neg Hx     Breast cancer additional  "onset Neg Hx       reports that she has never smoked. She has never used smokeless tobacco. She reports that she does not drink alcohol and does not use drugs.      Physical Exam:   Vitals:    05/19/25 0954   BP: 136/80   BP Location: Left arm   Patient Position: Sitting   Pulse: 88   Temp: 97.6 °F (36.4 °C)   SpO2: 99%   Weight: 135 kg (296 lb 9.6 oz)     Body mass index is 43.8 kg/m².    General: conscious, cooperative, in no acute distress, appears stated age  Eyes: conjunctivae pale, anicteric sclerae  ENT: lips and mucous membranes moist  Neck: supple, no JVD, no masses  Chest:  essentially clear breath sounds bilaterally, no crackles, ronchus or wheezings  CVS: S1 & S2, normal rate, regular rhythm  Abdomen: soft, non-tender, non-distended, normoactive bowel sounds, rounded  Extremities: +1 edema of both legs  Skin: no rash   Neuro: awake, alert, oriented       Diagnostic Data:  Lab: I have personally reviewed pertinent lab results.,   CBC:  Results from last 7 days   Lab Units 05/14/25  1000   WHITE BLOOD CELL COUNT. thou/cmm 6.7   HEMOGLOBIN. g/dL 12.4   HEMATOCRIT. % 38.4   PLATELETS. thou/cmm 216      CMP: No results found for: \"SODIUM\", \"K\", \"CL\", \"CO2\", \"ANIONGAP\", \"BUN\", \"CREATININE\", \"GLUCOSE\", \"CALCIUM\", \"AST\", \"ALT\", \"ALKPHOS\", \"PROT\", \"BILITOT\", \"EGFR\",   PT/INR: No results found for: \"PT\", \"INR\",   Magnesium: No components found for: \"MAG\",  Phosphorous: No results found for: \"PHOS\"    Patient Instructions   Blood pressures look great- can cut amlodipine in half and if numbers still look great you can try stopping.  Goal blood pressure <130/80 mmHg      Portions of the record may have been created with voice recognition software. Occasional wrong word or \"sound a like\" substitutions may have occurred due to the inherent limitations of voice recognition software. Read the chart carefully and recognize, using context, where substitutions have occurred.    If you have any questions, please contact the " dictating provider.         [1]   Current Outpatient Medications:     amLODIPine (NORVASC) 5 mg tablet, Take 1 tablet (5 mg total) by mouth daily, Disp: 90 tablet, Rfl: 1    aspirin 81 MG tablet, Take 1 tablet by mouth in the morning., Disp: , Rfl:     atorvastatin (LIPITOR) 10 mg tablet, Take 1 tablet (10 mg total) by mouth daily, Disp: 90 tablet, Rfl: 1    CINNAMON PO, Take 1 capsule by mouth in the morning., Disp: , Rfl:     Coenzyme Q-10 200 MG CAPS, Take 1 capsule by mouth, Disp: , Rfl:     Cranberry 500 MG CAPS, Take 1 capsule by mouth in the morning., Disp: , Rfl:     Jardiance 10 MG TABS tablet, TAKE 1 TABLET (10 MG TOTAL) BY MOUTH EVERY MORNING., Disp: 30 tablet, Rfl: 5    lisinopril (ZESTRIL) 10 mg tablet, TAKE 1 TABLET BY MOUTH EVERY DAY, Disp: 90 tablet, Rfl: 1    metoprolol tartrate (LOPRESSOR) 50 mg tablet, TAKE 1 TABLET BY MOUTH TWICE A DAY, Disp: 180 tablet, Rfl: 1    Multiple Vitamins-Minerals (DAILY MULTIPLE VITAMINS/MIN PO), Take 1 tablet by mouth in the morning., Disp: , Rfl:     Omega-3 Fatty Acids (FISH OIL) 1,000 mg, Take 1 capsule by mouth in the morning., Disp: , Rfl:     torsemide (DEMADEX) 10 mg tablet, TAKE 1 TABLET (10 MG TOTAL) BY MOUTH IF NEEDED (TAKE AS NEEDED FOR SWELLING - NO MORE THAN 1 A DAY UNLESS DIRECTED BY NEPHROLOGIST), Disp: 20 tablet, Rfl: 3    Cholecalciferol (VITAMIN D3) 2000 units capsule, Take 1 capsule by mouth daily (Patient not taking: Reported on 5/19/2025), Disp: , Rfl:     Protein (NUTRA/PRO CHOCOLATE PO), Take by mouth (Patient not taking: Reported on 5/19/2025), Disp: , Rfl:

## 2025-05-19 NOTE — ASSESSMENT & PLAN NOTE
Lab Results   Component Value Date    HGBA1C 5.5 11/05/2024   A1c well-controlled.  Remains on Jardiance every other day due to cost.

## 2025-05-19 NOTE — ASSESSMENT & PLAN NOTE
Lab Results   Component Value Date    EGFR 39 (L) 05/14/2025    EGFR 42 (L) 11/08/2024    EGFR 42 (L) 07/22/2024    CREATININE 1.45 (H) 05/14/2025    CREATININE 1.36 (H) 11/08/2024    CREATININE 1.35 (H) 07/22/2024   Baseline creatinine 1.3-1.4 mg/dL.  Etiology presumed hypertensive nephrosclerosis, diabetic nephropathy, obesity related hyperfiltration.  Continue Jardiance and lisinopril for CKD progression.  Repeat labs in 6 months

## 2025-05-19 NOTE — PATIENT INSTRUCTIONS
Blood pressures look great- can cut amlodipine in half and if numbers still look great you can try stopping.  Goal blood pressure <130/80 mmHg

## 2025-06-02 ENCOUNTER — TELEPHONE (OUTPATIENT)
Age: 70
End: 2025-06-02

## 2025-06-02 DIAGNOSIS — N18.30 CONTROLLED TYPE 2 DIABETES MELLITUS WITH STAGE 3 CHRONIC KIDNEY DISEASE, WITHOUT LONG-TERM CURRENT USE OF INSULIN (HCC): ICD-10-CM

## 2025-06-02 DIAGNOSIS — R80.1 PERSISTENT PROTEINURIA: ICD-10-CM

## 2025-06-02 DIAGNOSIS — E11.22 CONTROLLED TYPE 2 DIABETES MELLITUS WITH STAGE 3 CHRONIC KIDNEY DISEASE, WITHOUT LONG-TERM CURRENT USE OF INSULIN (HCC): ICD-10-CM

## 2025-06-02 NOTE — TELEPHONE ENCOUNTER
Patient's insurance company called in stating the patient had told them she continues to be on Jardiance, but takes it every other day, not everyday. So they need a new script for her Jardiance to be sent in stating she takes it every other day and have it be fore 15 tabs for 30 days or 45 tabs for 90 days. Please review and update Jardiance prescription and send to [facility] in [place].

## 2025-06-03 DIAGNOSIS — E11.69 TYPE 2 DIABETES MELLITUS WITH MORBID OBESITY (HCC): ICD-10-CM

## 2025-06-03 DIAGNOSIS — E66.01 TYPE 2 DIABETES MELLITUS WITH MORBID OBESITY (HCC): ICD-10-CM

## 2025-06-03 RX ORDER — ATORVASTATIN CALCIUM 10 MG/1
10 TABLET, FILM COATED ORAL DAILY
Qty: 90 TABLET | Refills: 1 | Status: SHIPPED | OUTPATIENT
Start: 2025-06-03

## 2025-06-10 ENCOUNTER — OFFICE VISIT (OUTPATIENT)
Dept: FAMILY MEDICINE CLINIC | Facility: CLINIC | Age: 70
End: 2025-06-10
Payer: COMMERCIAL

## 2025-06-10 VITALS
RESPIRATION RATE: 18 BRPM | OXYGEN SATURATION: 97 % | BODY MASS INDEX: 43.4 KG/M2 | DIASTOLIC BLOOD PRESSURE: 77 MMHG | WEIGHT: 293 LBS | HEIGHT: 69 IN | SYSTOLIC BLOOD PRESSURE: 124 MMHG | HEART RATE: 92 BPM | TEMPERATURE: 97.9 F

## 2025-06-10 DIAGNOSIS — Z12.31 ENCOUNTER FOR SCREENING MAMMOGRAM FOR BREAST CANCER: ICD-10-CM

## 2025-06-10 DIAGNOSIS — E11.22 CONTROLLED TYPE 2 DIABETES MELLITUS WITH STAGE 3 CHRONIC KIDNEY DISEASE, WITHOUT LONG-TERM CURRENT USE OF INSULIN (HCC): Primary | ICD-10-CM

## 2025-06-10 DIAGNOSIS — I10 ESSENTIAL HYPERTENSION: ICD-10-CM

## 2025-06-10 DIAGNOSIS — I48.11 LONGSTANDING PERSISTENT ATRIAL FIBRILLATION (HCC): ICD-10-CM

## 2025-06-10 DIAGNOSIS — Z53.20 REFUSAL OF ANTICOAGULANT MEDICATION BY PATIENT: ICD-10-CM

## 2025-06-10 DIAGNOSIS — N18.30 CONTROLLED TYPE 2 DIABETES MELLITUS WITH STAGE 3 CHRONIC KIDNEY DISEASE, WITHOUT LONG-TERM CURRENT USE OF INSULIN (HCC): Primary | ICD-10-CM

## 2025-06-10 LAB — SL AMB POCT HEMOGLOBIN AIC: 5.6 (ref ?–6.5)

## 2025-06-10 PROCEDURE — 99214 OFFICE O/P EST MOD 30 MIN: CPT | Performed by: FAMILY MEDICINE

## 2025-06-10 PROCEDURE — G2211 COMPLEX E/M VISIT ADD ON: HCPCS | Performed by: FAMILY MEDICINE

## 2025-06-10 PROCEDURE — 83036 HEMOGLOBIN GLYCOSYLATED A1C: CPT | Performed by: FAMILY MEDICINE

## 2025-06-10 NOTE — ASSESSMENT & PLAN NOTE
Ideally controlled, cpm  Lab Results   Component Value Date    HGBA1C 5.6 06/10/2025       Orders:    POCT hemoglobin A1c

## 2025-06-10 NOTE — PROGRESS NOTES
Name: Sharon Schoenberger      : 1955      MRN: [id number]  Encounter Provider: Jazmín Todd DO  Encounter Date: 6/10/2025   Encounter department: [facility] [place]  :  Assessment & Plan  Controlled type 2 diabetes mellitus with stage 3 chronic kidney disease, without long-term current use of insulin (HCC)  Ideally controlled, cpm  Lab Results   Component Value Date    HGBA1C 5.6 06/10/2025       Orders:    POCT hemoglobin A1c    Encounter for screening mammogram for breast cancer         Essential hypertension  Ideally controlled, cpm       Longstanding persistent atrial fibrillation (HCC)  Rate controlled, pt continues to refuse AC       Refusal of anticoagulant medication by patient                Chief Complaint   Patient presents with    Follow-up     6 month follow up       History of Present Illness   Scheduled f/u  BP on rooming 138/90 - recheck normal  Home readings on machine 120's-130/70's- 80 per pt, and she states that her nephrologist cut amlodipine in half because her BP's have been so good        2025  [facility]  Instructions  Blood pressures look great- can cut amlodipine in half and if numbers still look great you can try stopping.  Goal blood pressure <130/80 mmHg  Additional Documentation  Vitals: /80         10/5/2020  [facility] [place]  Nestor Bryson MD  Cardiology Morbid obesity (HCC) +2 more  Dx Hypertension · Atrial Fibrillation; Referred by Jazmín Todd DO  Reason for Visit  Plan:  Atrial fibrillation (HCC)  Has been present for years.  At this point her chads Vasc score is 3.  I recommended full anticoagulation.  She simply does not want that at present.  I also said that an echocardiogram might be helpful because there may be findings on that that would make me less concerned about stroke or more concerned and could help guide therapy.  She is too worried about  the deductible to have this test.  Essential hypertension  Weight loss and salt avoidance would be useful.  She notes that she missed her meds last night and that could be a factor today.  Morbid obesity (HCC)  We talked about lower carb diet and even about going to the weight loss center in [place].  She is considering.  Follow up Plan:  One year EKG and follow-up visit.  If she would consent to it I would love to see an echo prior.  She has too worried about what her insurance would pay or what we would find to have this at present.  HPI:  Patient is seen today in consultation from Dr. Jazmín Todd regarding the above.  I last saw the patient 5 years ago.  At this point the patient is known to have atrial fibrillation for many years.  For the most part her heart rate is controlled.  No prominent palpitations.  She is pretty good about taking her medications but did not take any last night.  No recent chest pain or chest pressure.  No change in exertional capacity.  Walking is somewhat limited however.  Results for orders placed or performed in visit on 10/05/20  POCT ECG    Impression    Afib with moderate to rapid response at 101/minute. Otherwise WNL.  Most recent or relevant cardiac/vascular testing:    Echocardiogram 07/21/2015:  Normal LV systolic function.  No significant LVH.  Past Medical History:  Diagnosis Date  · Hypokalemia      Last Assessed: 8/12/2016   · Hyponatremia      Last Assessed: 8/12/2016   · Irregular heartbeat      A Flutter 7/2015 while in hospital for pneumonia, resolved   · Pneumonia    Instructions  Return in about 1 year (around 10/5/2021).  Low carbohydrate diet.  Really minimize bread, potatoes, snacks, juice, and rice.               Review of Systems   Constitutional: Negative.    Respiratory: Negative.     Cardiovascular:  Positive for leg swelling (slight, unchanged). Negative for chest pain and palpitations.   Gastrointestinal: Negative.    Neurological: Negative.   "  Hematological: Negative.        Objective   /77   Pulse 92   Temp 97.9 °F (36.6 °C) (Tympanic)   Resp 18   Ht 5' 9\" (1.753 m)   Wt 135 kg (297 lb 9.6 oz)   SpO2 97%   BMI 43.95 kg/m²      Physical Exam  Vitals and nursing note reviewed.   Constitutional:       General: She is not in acute distress.     Appearance: She is well-groomed. She is morbidly obese. She is not ill-appearing, toxic-appearing or diaphoretic.   HENT:      Head: Normocephalic and atraumatic.      Nose: Nose normal.      Mouth/Throat:      Mouth: Mucous membranes are moist.     Eyes:      Conjunctiva/sclera: Conjunctivae normal.     Neck:      Thyroid: No thyroid mass, thyromegaly or thyroid tenderness.      Vascular: No JVD.      Trachea: Trachea and phonation normal.     Cardiovascular:      Rate and Rhythm: Normal rate. Rhythm irregularly irregular.      Pulses: Normal pulses.      Heart sounds: Normal heart sounds.   Pulmonary:      Effort: Pulmonary effort is normal.      Breath sounds: Normal breath sounds and air entry.     Musculoskeletal:      Cervical back: Neck supple.      Right lower leg: No edema.      Left lower leg: No edema.   Lymphadenopathy:      Cervical: No cervical adenopathy.     Skin:     Coloration: Skin is not pale.     Neurological:      General: No focal deficit present.      Mental Status: She is alert and oriented to person, place, and time.      Gait: Gait normal.     Psychiatric:         Mood and Affect: Mood normal.         Behavior: Behavior normal. Behavior is cooperative.         Cognition and Memory: Cognition and memory normal.         "

## 2025-07-14 ENCOUNTER — HOSPITAL ENCOUNTER (OUTPATIENT)
Dept: MAMMOGRAPHY | Facility: HOSPITAL | Age: 70
Discharge: HOME | End: 2025-07-14
Attending: FAMILY MEDICINE
Payer: COMMERCIAL

## 2025-07-14 DIAGNOSIS — Z12.31 ENCOUNTER FOR SCREENING MAMMOGRAM FOR BREAST CANCER: ICD-10-CM

## 2025-07-14 PROCEDURE — 77063 BREAST TOMOSYNTHESIS BI: CPT

## 2025-07-14 PROCEDURE — 77067 SCR MAMMO BI INCL CAD: CPT
